# Patient Record
Sex: FEMALE | Race: WHITE | Employment: OTHER | URBAN - METROPOLITAN AREA
[De-identification: names, ages, dates, MRNs, and addresses within clinical notes are randomized per-mention and may not be internally consistent; named-entity substitution may affect disease eponyms.]

---

## 2017-01-01 ENCOUNTER — SNF VISIT (OUTPATIENT)
Dept: INTERNAL MEDICINE CLINIC | Facility: SKILLED NURSING FACILITY | Age: 72
End: 2017-01-01

## 2017-01-01 ENCOUNTER — OFFICE VISIT (OUTPATIENT)
Dept: FAMILY MEDICINE CLINIC | Facility: CLINIC | Age: 72
End: 2017-01-01

## 2017-01-01 ENCOUNTER — TELEPHONE (OUTPATIENT)
Dept: FAMILY MEDICINE CLINIC | Facility: CLINIC | Age: 72
End: 2017-01-01

## 2017-01-01 ENCOUNTER — PATIENT OUTREACH (OUTPATIENT)
Dept: CASE MANAGEMENT | Age: 72
End: 2017-01-01

## 2017-01-01 ENCOUNTER — APPOINTMENT (OUTPATIENT)
Dept: CT IMAGING | Facility: HOSPITAL | Age: 72
DRG: 064 | End: 2017-01-01
Attending: Other
Payer: MEDICARE

## 2017-01-01 ENCOUNTER — SOCIAL WORK SERVICES (OUTPATIENT)
Dept: FAMILY MEDICINE CLINIC | Facility: CLINIC | Age: 72
End: 2017-01-01

## 2017-01-01 ENCOUNTER — APPOINTMENT (OUTPATIENT)
Dept: CT IMAGING | Facility: HOSPITAL | Age: 72
DRG: 064 | End: 2017-01-01
Attending: EMERGENCY MEDICINE
Payer: MEDICARE

## 2017-01-01 ENCOUNTER — AFTERCARE VISIT (OUTPATIENT)
Dept: FAMILY MEDICINE CLINIC | Facility: CLINIC | Age: 72
End: 2017-01-01

## 2017-01-01 ENCOUNTER — TELEPHONE (OUTPATIENT)
Dept: INTERNAL MEDICINE UNIT | Facility: HOSPITAL | Age: 72
End: 2017-01-01

## 2017-01-01 ENCOUNTER — APPOINTMENT (OUTPATIENT)
Dept: ULTRASOUND IMAGING | Facility: HOSPITAL | Age: 72
DRG: 064 | End: 2017-01-01
Attending: Other
Payer: MEDICARE

## 2017-01-01 ENCOUNTER — HOSPITAL ENCOUNTER (INPATIENT)
Facility: HOSPITAL | Age: 72
LOS: 4 days | Discharge: NURSING, REHAB OR LONG-TERM CARE | DRG: 064 | End: 2017-01-01
Attending: EMERGENCY MEDICINE | Admitting: INTERNAL MEDICINE
Payer: MEDICARE

## 2017-01-01 ENCOUNTER — APPOINTMENT (OUTPATIENT)
Dept: GENERAL RADIOLOGY | Facility: HOSPITAL | Age: 72
DRG: 064 | End: 2017-01-01
Attending: INTERNAL MEDICINE
Payer: MEDICARE

## 2017-01-01 VITALS
BODY MASS INDEX: 17 KG/M2 | WEIGHT: 101.19 LBS | SYSTOLIC BLOOD PRESSURE: 133 MMHG | OXYGEN SATURATION: 96 % | TEMPERATURE: 98 F | DIASTOLIC BLOOD PRESSURE: 67 MMHG | RESPIRATION RATE: 18 BRPM | HEART RATE: 90 BPM

## 2017-01-01 VITALS
SYSTOLIC BLOOD PRESSURE: 122 MMHG | OXYGEN SATURATION: 92 % | WEIGHT: 102 LBS | RESPIRATION RATE: 18 BRPM | HEART RATE: 81 BPM | TEMPERATURE: 97 F | DIASTOLIC BLOOD PRESSURE: 60 MMHG | BODY MASS INDEX: 20 KG/M2

## 2017-01-01 VITALS
SYSTOLIC BLOOD PRESSURE: 133 MMHG | DIASTOLIC BLOOD PRESSURE: 58 MMHG | OXYGEN SATURATION: 94 % | BODY MASS INDEX: 16 KG/M2 | TEMPERATURE: 97 F | RESPIRATION RATE: 18 BRPM | HEART RATE: 90 BPM | WEIGHT: 98.81 LBS

## 2017-01-01 VITALS
TEMPERATURE: 98 F | BODY MASS INDEX: 17 KG/M2 | SYSTOLIC BLOOD PRESSURE: 133 MMHG | RESPIRATION RATE: 18 BRPM | WEIGHT: 101.19 LBS | HEART RATE: 90 BPM | OXYGEN SATURATION: 96 % | DIASTOLIC BLOOD PRESSURE: 67 MMHG

## 2017-01-01 VITALS
HEART RATE: 76 BPM | OXYGEN SATURATION: 93 % | RESPIRATION RATE: 18 BRPM | SYSTOLIC BLOOD PRESSURE: 127 MMHG | TEMPERATURE: 97 F | BODY MASS INDEX: 19 KG/M2 | DIASTOLIC BLOOD PRESSURE: 82 MMHG | WEIGHT: 96.81 LBS

## 2017-01-01 VITALS
OXYGEN SATURATION: 96 % | DIASTOLIC BLOOD PRESSURE: 70 MMHG | BODY MASS INDEX: 19 KG/M2 | RESPIRATION RATE: 18 BRPM | SYSTOLIC BLOOD PRESSURE: 134 MMHG | TEMPERATURE: 97 F | WEIGHT: 99.81 LBS | HEART RATE: 95 BPM

## 2017-01-01 VITALS
DIASTOLIC BLOOD PRESSURE: 85 MMHG | HEART RATE: 88 BPM | WEIGHT: 98 LBS | SYSTOLIC BLOOD PRESSURE: 134 MMHG | BODY MASS INDEX: 16 KG/M2

## 2017-01-01 VITALS
TEMPERATURE: 97 F | BODY MASS INDEX: 21 KG/M2 | OXYGEN SATURATION: 97 % | WEIGHT: 109.19 LBS | DIASTOLIC BLOOD PRESSURE: 60 MMHG | SYSTOLIC BLOOD PRESSURE: 120 MMHG | RESPIRATION RATE: 20 BRPM | HEART RATE: 81 BPM

## 2017-01-01 VITALS
WEIGHT: 95.19 LBS | BODY MASS INDEX: 18.69 KG/M2 | HEIGHT: 60 IN | TEMPERATURE: 99 F | SYSTOLIC BLOOD PRESSURE: 123 MMHG | DIASTOLIC BLOOD PRESSURE: 87 MMHG | RESPIRATION RATE: 20 BRPM | HEART RATE: 92 BPM | OXYGEN SATURATION: 91 %

## 2017-01-01 VITALS
SYSTOLIC BLOOD PRESSURE: 122 MMHG | WEIGHT: 102 LBS | OXYGEN SATURATION: 97 % | DIASTOLIC BLOOD PRESSURE: 60 MMHG | TEMPERATURE: 97 F | RESPIRATION RATE: 20 BRPM | BODY MASS INDEX: 20 KG/M2 | HEART RATE: 81 BPM

## 2017-01-01 VITALS
SYSTOLIC BLOOD PRESSURE: 127 MMHG | WEIGHT: 96.81 LBS | HEART RATE: 76 BPM | BODY MASS INDEX: 19 KG/M2 | DIASTOLIC BLOOD PRESSURE: 82 MMHG | TEMPERATURE: 97 F | OXYGEN SATURATION: 97 % | RESPIRATION RATE: 20 BRPM

## 2017-01-01 VITALS
HEART RATE: 82 BPM | TEMPERATURE: 98 F | WEIGHT: 100 LBS | BODY MASS INDEX: 20 KG/M2 | SYSTOLIC BLOOD PRESSURE: 136 MMHG | DIASTOLIC BLOOD PRESSURE: 80 MMHG

## 2017-01-01 VITALS
SYSTOLIC BLOOD PRESSURE: 110 MMHG | OXYGEN SATURATION: 95 % | RESPIRATION RATE: 18 BRPM | DIASTOLIC BLOOD PRESSURE: 74 MMHG | HEART RATE: 80 BPM | TEMPERATURE: 99 F

## 2017-01-01 VITALS
WEIGHT: 99.38 LBS | SYSTOLIC BLOOD PRESSURE: 122 MMHG | HEART RATE: 81 BPM | RESPIRATION RATE: 18 BRPM | BODY MASS INDEX: 19 KG/M2 | DIASTOLIC BLOOD PRESSURE: 60 MMHG | TEMPERATURE: 97 F

## 2017-01-01 DIAGNOSIS — M40.00 KYPHOSIS (ACQUIRED) (POSTURAL): ICD-10-CM

## 2017-01-01 DIAGNOSIS — F32.A DEPRESSION, UNSPECIFIED DEPRESSION TYPE: ICD-10-CM

## 2017-01-01 DIAGNOSIS — J96.01 ACUTE RESPIRATORY FAILURE WITH HYPOXIA (HCC): ICD-10-CM

## 2017-01-01 DIAGNOSIS — Z99.81 DEPENDENCE ON SUPPLEMENTAL OXYGEN: ICD-10-CM

## 2017-01-01 DIAGNOSIS — R41.3 SHORT-TERM MEMORY LOSS: Primary | ICD-10-CM

## 2017-01-01 DIAGNOSIS — K21.9 GASTROESOPHAGEAL REFLUX DISEASE, ESOPHAGITIS PRESENCE NOT SPECIFIED: ICD-10-CM

## 2017-01-01 DIAGNOSIS — M40.10 KYPHOSIS DUE TO OSTEOPOROSIS: ICD-10-CM

## 2017-01-01 DIAGNOSIS — Z98.890 H/O KYPHOPLASTY: ICD-10-CM

## 2017-01-01 DIAGNOSIS — M48.03 SPINAL STENOSIS OF CERVICOTHORACIC REGION: ICD-10-CM

## 2017-01-01 DIAGNOSIS — E87.6 HYPOKALEMIA: ICD-10-CM

## 2017-01-01 DIAGNOSIS — F41.1 GENERALIZED ANXIETY DISORDER: ICD-10-CM

## 2017-01-01 DIAGNOSIS — F41.9 ANXIETY DISORDER, UNSPECIFIED TYPE: ICD-10-CM

## 2017-01-01 DIAGNOSIS — J44.9 CHRONIC OBSTRUCTIVE PULMONARY DISEASE, UNSPECIFIED COPD TYPE (HCC): ICD-10-CM

## 2017-01-01 DIAGNOSIS — M40.00 KYPHOSIS (ACQUIRED) (POSTURAL): Primary | ICD-10-CM

## 2017-01-01 DIAGNOSIS — M54.40 CHRONIC BILATERAL LOW BACK PAIN WITH SCIATICA, SCIATICA LATERALITY UNSPECIFIED: Primary | ICD-10-CM
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
## 2017-01-01 DIAGNOSIS — M81.0 KYPHOSIS DUE TO OSTEOPOROSIS: ICD-10-CM

## 2017-01-01 DIAGNOSIS — F01.50 MIXED VASCULAR AND NEURODEGENERATIVE DEMENTIA WITHOUT BEHAVIORAL DISTURBANCE (HCC): ICD-10-CM

## 2017-01-01 DIAGNOSIS — J43.9 PULMONARY EMPHYSEMA, UNSPECIFIED EMPHYSEMA TYPE (HCC): ICD-10-CM

## 2017-01-01 DIAGNOSIS — J98.01 ACUTE BRONCHOSPASM: ICD-10-CM

## 2017-01-01 DIAGNOSIS — G89.4 CHRONIC PAIN SYNDROME: Primary | ICD-10-CM

## 2017-01-01 DIAGNOSIS — I63.312 CEREBROVASCULAR ACCIDENT (CVA) DUE TO THROMBOSIS OF LEFT MIDDLE CEREBRAL ARTERY (HCC): Primary | ICD-10-CM

## 2017-01-01 DIAGNOSIS — G89.4 CHRONIC PAIN SYNDROME: ICD-10-CM

## 2017-01-01 DIAGNOSIS — N30.00 ACUTE CYSTITIS WITHOUT HEMATURIA: Primary | ICD-10-CM

## 2017-01-01 DIAGNOSIS — R41.3 MEMORY LOSS: ICD-10-CM

## 2017-01-01 DIAGNOSIS — I63.312 CEREBROVASCULAR ACCIDENT (CVA) DUE TO THROMBOSIS OF LEFT MIDDLE CEREBRAL ARTERY (HCC): ICD-10-CM

## 2017-01-01 DIAGNOSIS — R48.2 APRAXIA: ICD-10-CM

## 2017-01-01 DIAGNOSIS — R53.1 GENERALIZED WEAKNESS: ICD-10-CM

## 2017-01-01 DIAGNOSIS — J96.01 ACUTE RESPIRATORY FAILURE WITH HYPOXIA (HCC): Primary | ICD-10-CM

## 2017-01-01 DIAGNOSIS — G89.29 CHRONIC BILATERAL LOW BACK PAIN WITH SCIATICA, SCIATICA LATERALITY UNSPECIFIED: Primary | ICD-10-CM

## 2017-01-01 DIAGNOSIS — M54.42 ACUTE BACK PAIN WITH SCIATICA, LEFT: Primary | ICD-10-CM

## 2017-01-01 DIAGNOSIS — E44.1 PROTEIN-CALORIE MALNUTRITION, MILD (HCC): ICD-10-CM

## 2017-01-01 DIAGNOSIS — I63.9 CEREBROVASCULAR ACCIDENT (CVA), UNSPECIFIED MECHANISM (HCC): ICD-10-CM

## 2017-01-01 PROCEDURE — 99233 SBSQ HOSP IP/OBS HIGH 50: CPT | Performed by: HOSPITALIST

## 2017-01-01 PROCEDURE — 99310 SBSQ NF CARE HIGH MDM 45: CPT | Performed by: NURSE PRACTITIONER

## 2017-01-01 PROCEDURE — 99232 SBSQ HOSP IP/OBS MODERATE 35: CPT | Performed by: OTHER

## 2017-01-01 PROCEDURE — 99308 SBSQ NF CARE LOW MDM 20: CPT | Performed by: NURSE PRACTITIONER

## 2017-01-01 PROCEDURE — G0180 MD CERTIFICATION HHA PATIENT: HCPCS | Performed by: FAMILY MEDICINE

## 2017-01-01 PROCEDURE — 71260 CT THORAX DX C+: CPT | Performed by: EMERGENCY MEDICINE

## 2017-01-01 PROCEDURE — 70450 CT HEAD/BRAIN W/O DYE: CPT | Performed by: OTHER

## 2017-01-01 PROCEDURE — 99223 1ST HOSP IP/OBS HIGH 75: CPT | Performed by: OTHER

## 2017-01-01 PROCEDURE — 71020 XR CHEST PA + LAT CHEST (CPT=71020): CPT | Performed by: INTERNAL MEDICINE

## 2017-01-01 PROCEDURE — 99309 SBSQ NF CARE MODERATE MDM 30: CPT | Performed by: NURSE PRACTITIONER

## 2017-01-01 PROCEDURE — 90792 PSYCH DIAG EVAL W/MED SRVCS: CPT | Performed by: OTHER

## 2017-01-01 PROCEDURE — 3E0F76Z INTRODUCTION OF NUTRITIONAL SUBSTANCE INTO RESPIRATORY TRACT, VIA NATURAL OR ARTIFICIAL OPENING: ICD-10-PCS | Performed by: HOSPITALIST

## 2017-01-01 PROCEDURE — 99214 OFFICE O/P EST MOD 30 MIN: CPT | Performed by: FAMILY MEDICINE

## 2017-01-01 PROCEDURE — 99239 HOSP IP/OBS DSCHRG MGMT >30: CPT | Performed by: HOSPITALIST

## 2017-01-01 PROCEDURE — 99221 1ST HOSP IP/OBS SF/LOW 40: CPT | Performed by: INTERNAL MEDICINE

## 2017-01-01 PROCEDURE — 93880 EXTRACRANIAL BILAT STUDY: CPT | Performed by: OTHER

## 2017-01-01 PROCEDURE — 99495 TRANSJ CARE MGMT MOD F2F 14D: CPT | Performed by: FAMILY MEDICINE

## 2017-01-01 PROCEDURE — G0463 HOSPITAL OUTPT CLINIC VISIT: HCPCS | Performed by: FAMILY MEDICINE

## 2017-01-01 PROCEDURE — 99307 SBSQ NF CARE SF MDM 10: CPT | Performed by: NURSE PRACTITIONER

## 2017-01-01 RX ORDER — LATANOPROST 50 UG/ML
1 SOLUTION/ DROPS OPHTHALMIC NIGHTLY
Status: DISCONTINUED | OUTPATIENT
Start: 2017-01-01 | End: 2017-01-01

## 2017-01-01 RX ORDER — CALCITONIN SALMON 200 [IU]/.09ML
1 SPRAY, METERED NASAL DAILY
Status: DISCONTINUED | OUTPATIENT
Start: 2017-01-01 | End: 2017-01-01

## 2017-01-01 RX ORDER — HYDROCODONE BITARTRATE AND ACETAMINOPHEN 7.5; 325 MG/1; MG/1
1 TABLET ORAL EVERY 6 HOURS PRN
Qty: 60 TABLET | Refills: 0 | Status: SHIPPED | OUTPATIENT
Start: 2017-01-01 | End: 2017-01-01 | Stop reason: CLARIF

## 2017-01-01 RX ORDER — QUETIAPINE 25 MG/1
25 TABLET, FILM COATED ORAL NIGHTLY
Qty: 30 TABLET | Refills: 0 | Status: ON HOLD | OUTPATIENT
Start: 2017-01-01 | End: 2018-01-01

## 2017-01-01 RX ORDER — ACETAMINOPHEN 650 MG/1
650 SUPPOSITORY RECTAL EVERY 4 HOURS PRN
Status: DISCONTINUED | OUTPATIENT
Start: 2017-01-01 | End: 2017-01-01

## 2017-01-01 RX ORDER — IPRATROPIUM BROMIDE AND ALBUTEROL SULFATE 2.5; .5 MG/3ML; MG/3ML
3 SOLUTION RESPIRATORY (INHALATION) EVERY 6 HOURS PRN
Status: DISCONTINUED | OUTPATIENT
Start: 2017-01-01 | End: 2017-01-01

## 2017-01-01 RX ORDER — ALBUTEROL SULFATE 2.5 MG/3ML
2.5 SOLUTION RESPIRATORY (INHALATION) EVERY 2 HOUR PRN
Status: DISCONTINUED | OUTPATIENT
Start: 2017-01-01 | End: 2017-01-01

## 2017-01-01 RX ORDER — DONEPEZIL HYDROCHLORIDE 10 MG/1
10 TABLET, FILM COATED ORAL NIGHTLY
Status: DISCONTINUED | OUTPATIENT
Start: 2017-01-01 | End: 2017-01-01

## 2017-01-01 RX ORDER — NYSTATIN 100000 U/G
CREAM TOPICAL 2 TIMES DAILY
Status: DISCONTINUED | OUTPATIENT
Start: 2017-01-01 | End: 2017-01-01

## 2017-01-01 RX ORDER — ONDANSETRON 2 MG/ML
4 INJECTION INTRAMUSCULAR; INTRAVENOUS EVERY 6 HOURS PRN
Status: DISCONTINUED | OUTPATIENT
Start: 2017-01-01 | End: 2017-01-01

## 2017-01-01 RX ORDER — DICYCLOMINE HYDROCHLORIDE 10 MG/1
10 CAPSULE ORAL 2 TIMES DAILY PRN
Status: DISCONTINUED | OUTPATIENT
Start: 2017-01-01 | End: 2017-01-01

## 2017-01-01 RX ORDER — PANTOPRAZOLE SODIUM 20 MG/1
20 TABLET, DELAYED RELEASE ORAL 2 TIMES DAILY
Status: DISCONTINUED | OUTPATIENT
Start: 2017-01-01 | End: 2017-01-01

## 2017-01-01 RX ORDER — ACETAMINOPHEN 325 MG/1
650 TABLET ORAL EVERY 4 HOURS PRN
Status: DISCONTINUED | OUTPATIENT
Start: 2017-01-01 | End: 2017-01-01

## 2017-01-01 RX ORDER — LORAZEPAM 1 MG/1
1 TABLET ORAL EVERY 4 HOURS PRN
Status: DISCONTINUED | OUTPATIENT
Start: 2017-01-01 | End: 2017-01-01

## 2017-01-01 RX ORDER — MAGNESIUM OXIDE 400 MG (241.3 MG MAGNESIUM) TABLET
400 TABLET ONCE
Status: COMPLETED | OUTPATIENT
Start: 2017-01-01 | End: 2017-01-01

## 2017-01-01 RX ORDER — BISACODYL 10 MG
10 SUPPOSITORY, RECTAL RECTAL
Status: DISCONTINUED | OUTPATIENT
Start: 2017-01-01 | End: 2017-01-01

## 2017-01-01 RX ORDER — BUPROPION HYDROCHLORIDE 150 MG/1
150 TABLET ORAL DAILY
Status: DISCONTINUED | OUTPATIENT
Start: 2017-01-01 | End: 2017-01-01

## 2017-01-01 RX ORDER — SERTRALINE HYDROCHLORIDE 100 MG/1
300 TABLET, FILM COATED ORAL DAILY
Status: DISCONTINUED | OUTPATIENT
Start: 2017-01-01 | End: 2017-01-01

## 2017-01-01 RX ORDER — HEPARIN SODIUM 5000 [USP'U]/ML
5000 INJECTION, SOLUTION INTRAVENOUS; SUBCUTANEOUS EVERY 12 HOURS SCHEDULED
Status: DISCONTINUED | OUTPATIENT
Start: 2017-01-01 | End: 2017-01-01

## 2017-01-01 RX ORDER — LORAZEPAM 1 MG/1
1 TABLET ORAL EVERY 4 HOURS
Status: DISCONTINUED | OUTPATIENT
Start: 2017-01-01 | End: 2017-01-01

## 2017-01-01 RX ORDER — QUETIAPINE 100 MG/1
25 TABLET, FILM COATED ORAL NIGHTLY
Status: DISCONTINUED | OUTPATIENT
Start: 2017-01-01 | End: 2017-01-01

## 2017-01-01 RX ORDER — BISACODYL 10 MG
10 SUPPOSITORY, RECTAL RECTAL DAILY PRN
Qty: 20 SUPPOSITORY | Refills: 0 | Status: ON HOLD | OUTPATIENT
Start: 2017-01-01 | End: 2018-01-01

## 2017-01-01 RX ORDER — LORAZEPAM 0.5 MG/1
0.5 TABLET ORAL EVERY 4 HOURS PRN
Status: DISCONTINUED | OUTPATIENT
Start: 2017-01-01 | End: 2017-01-01

## 2017-01-01 RX ORDER — IPRATROPIUM BROMIDE AND ALBUTEROL SULFATE 2.5; .5 MG/3ML; MG/3ML
SOLUTION RESPIRATORY (INHALATION)
Refills: 0 | COMMUNITY
Start: 2017-01-01

## 2017-01-01 RX ORDER — LORAZEPAM 0.5 MG/1
0.5 TABLET ORAL EVERY 4 HOURS PRN
Qty: 20 TABLET | Refills: 0 | Status: ON HOLD | OUTPATIENT
Start: 2017-01-01 | End: 2018-01-01

## 2017-01-01 RX ORDER — HEPARIN SODIUM 5000 [USP'U]/ML
5000 INJECTION, SOLUTION INTRAVENOUS; SUBCUTANEOUS EVERY 8 HOURS SCHEDULED
Status: DISCONTINUED | OUTPATIENT
Start: 2017-01-01 | End: 2017-01-01

## 2017-01-01 RX ORDER — CALCITONIN SALMON 200 [IU]/.09ML
1 SPRAY, METERED NASAL DAILY
Qty: 3 BOTTLE | Refills: 3 | Status: SHIPPED | OUTPATIENT
Start: 2017-01-01

## 2017-01-01 RX ORDER — POLYETHYLENE GLYCOL 3350 17 G/17G
17 POWDER, FOR SOLUTION ORAL DAILY PRN
Status: DISCONTINUED | OUTPATIENT
Start: 2017-01-01 | End: 2017-01-01

## 2017-01-01 RX ORDER — ACETAMINOPHEN 325 MG/1
650 TABLET ORAL EVERY 6 HOURS PRN
Status: DISCONTINUED | OUTPATIENT
Start: 2017-01-01 | End: 2017-01-01

## 2017-01-01 RX ORDER — QUETIAPINE 100 MG/1
100 TABLET, FILM COATED ORAL NIGHTLY
Status: DISCONTINUED | OUTPATIENT
Start: 2017-01-01 | End: 2017-01-01

## 2017-01-01 RX ORDER — SODIUM CHLORIDE 0.9 % (FLUSH) 0.9 %
3 SYRINGE (ML) INJECTION AS NEEDED
Status: DISCONTINUED | OUTPATIENT
Start: 2017-01-01 | End: 2017-01-01

## 2017-01-01 RX ORDER — ASPIRIN 81 MG/1
81 TABLET, CHEWABLE ORAL DAILY
Status: DISCONTINUED | OUTPATIENT
Start: 2017-01-01 | End: 2017-01-01

## 2017-01-01 RX ORDER — MEMANTINE HYDROCHLORIDE 10 MG/1
10 TABLET ORAL 2 TIMES DAILY
Status: DISCONTINUED | OUTPATIENT
Start: 2017-01-01 | End: 2017-01-01

## 2017-01-01 RX ORDER — HYDROCODONE BITARTRATE AND ACETAMINOPHEN 7.5; 325 MG/1; MG/1
TABLET ORAL
Refills: 0 | COMMUNITY
Start: 2017-01-01 | End: 2017-01-01

## 2017-01-01 RX ORDER — ALBUTEROL SULFATE 90 UG/1
2 AEROSOL, METERED RESPIRATORY (INHALATION) EVERY 6 HOURS PRN
Status: DISCONTINUED | OUTPATIENT
Start: 2017-01-01 | End: 2017-01-01

## 2017-01-01 RX ORDER — QUETIAPINE 100 MG/1
50 TABLET, FILM COATED ORAL NIGHTLY
Status: DISCONTINUED | OUTPATIENT
Start: 2017-01-01 | End: 2017-01-01

## 2017-01-01 RX ORDER — DOCUSATE SODIUM 100 MG/1
100 CAPSULE, LIQUID FILLED ORAL 2 TIMES DAILY
Status: DISCONTINUED | OUTPATIENT
Start: 2017-01-01 | End: 2017-01-01

## 2017-01-01 RX ORDER — ASPIRIN 81 MG/1
81 TABLET, CHEWABLE ORAL DAILY
Qty: 30 TABLET | Refills: 0 | Status: SHIPPED | OUTPATIENT
Start: 2017-01-01

## 2017-01-01 RX ORDER — SODIUM PHOSPHATE, DIBASIC AND SODIUM PHOSPHATE, MONOBASIC 7; 19 G/133ML; G/133ML
1 ENEMA RECTAL ONCE AS NEEDED
Status: DISCONTINUED | OUTPATIENT
Start: 2017-01-01 | End: 2017-01-01

## 2017-01-01 RX ORDER — 0.9 % SODIUM CHLORIDE 0.9 %
VIAL (ML) INJECTION
Status: COMPLETED
Start: 2017-01-01 | End: 2017-01-01

## 2017-01-01 RX ORDER — POTASSIUM CHLORIDE 20 MEQ/1
40 TABLET, EXTENDED RELEASE ORAL EVERY 4 HOURS
Status: COMPLETED | OUTPATIENT
Start: 2017-01-01 | End: 2017-01-01

## 2017-01-01 RX ORDER — IPRATROPIUM BROMIDE AND ALBUTEROL SULFATE 2.5; .5 MG/3ML; MG/3ML
3 SOLUTION RESPIRATORY (INHALATION)
Status: DISCONTINUED | OUTPATIENT
Start: 2017-01-01 | End: 2017-01-01

## 2017-01-05 ENCOUNTER — APPOINTMENT (OUTPATIENT)
Dept: WOUND CARE | Facility: HOSPITAL | Age: 72
End: 2017-01-05
Payer: MEDICARE

## 2017-01-12 RX ORDER — DICYCLOMINE HYDROCHLORIDE 10 MG/1
CAPSULE ORAL
Qty: 180 CAPSULE | Refills: 0 | Status: SHIPPED | OUTPATIENT
Start: 2017-01-12 | End: 2017-03-10

## 2017-01-12 NOTE — TELEPHONE ENCOUNTER
Chart reviewed. Refills sent per Triage Dept protocol.      Refill Protocol Appointment Criteria  · Appointment scheduled in the past 6 months or in the next 3 months  Recent Visits       Provider Department Primary Dx    2 months ago Donaldo Hernandez MD

## 2017-01-30 ENCOUNTER — OFFICE VISIT (OUTPATIENT)
Dept: FAMILY MEDICINE CLINIC | Facility: CLINIC | Age: 72
End: 2017-01-30

## 2017-01-30 VITALS
DIASTOLIC BLOOD PRESSURE: 70 MMHG | HEART RATE: 76 BPM | SYSTOLIC BLOOD PRESSURE: 145 MMHG | TEMPERATURE: 98 F | BODY MASS INDEX: 21 KG/M2 | RESPIRATION RATE: 20 BRPM | WEIGHT: 106 LBS

## 2017-01-30 DIAGNOSIS — G89.29 CHRONIC BILATERAL LOW BACK PAIN WITH BILATERAL SCIATICA: ICD-10-CM

## 2017-01-30 DIAGNOSIS — M54.41 CHRONIC BILATERAL LOW BACK PAIN WITH BILATERAL SCIATICA: ICD-10-CM

## 2017-01-30 DIAGNOSIS — L89.611 DECUBITUS ULCER OF RIGHT HEEL, STAGE 1: Primary | ICD-10-CM

## 2017-01-30 DIAGNOSIS — F41.9 ANXIETY DISORDER, UNSPECIFIED TYPE: ICD-10-CM

## 2017-01-30 DIAGNOSIS — K21.9 GASTROESOPHAGEAL REFLUX DISEASE, ESOPHAGITIS PRESENCE NOT SPECIFIED: ICD-10-CM

## 2017-01-30 DIAGNOSIS — M40.00 KYPHOSIS (ACQUIRED) (POSTURAL): ICD-10-CM

## 2017-01-30 DIAGNOSIS — M54.42 CHRONIC BILATERAL LOW BACK PAIN WITH BILATERAL SCIATICA: ICD-10-CM

## 2017-01-30 PROCEDURE — 99214 OFFICE O/P EST MOD 30 MIN: CPT | Performed by: FAMILY MEDICINE

## 2017-01-30 PROCEDURE — G0463 HOSPITAL OUTPT CLINIC VISIT: HCPCS | Performed by: FAMILY MEDICINE

## 2017-01-30 RX ORDER — HYDROCODONE BITARTRATE AND ACETAMINOPHEN 7.5; 325 MG/1; MG/1
1 TABLET ORAL EVERY 4 HOURS PRN
Qty: 150 TABLET | Refills: 0 | Status: SHIPPED | OUTPATIENT
Start: 2017-03-10 | End: 2017-01-30

## 2017-01-30 RX ORDER — HYDROCODONE BITARTRATE AND ACETAMINOPHEN 7.5; 325 MG/1; MG/1
1 TABLET ORAL EVERY 4 HOURS PRN
Qty: 150 TABLET | Refills: 0 | Status: SHIPPED | OUTPATIENT
Start: 2017-02-08 | End: 2017-01-30

## 2017-01-30 RX ORDER — HYDROCODONE BITARTRATE AND ACETAMINOPHEN 7.5; 325 MG/1; MG/1
1 TABLET ORAL EVERY 4 HOURS PRN
Qty: 150 TABLET | Refills: 0 | Status: SHIPPED | OUTPATIENT
Start: 2017-04-09 | End: 2017-05-09

## 2017-01-30 NOTE — PROGRESS NOTES
HPI:    Patient ID: Jackelyn Alcaraz is a 66year old female. Patient presents with:  Fibromyalgia: f/u  Wound: R heel  Referral: request PT referral/ prefers at home PT if possible    HPI  3 mo followup for Norco refills.    Hx GERD, back deformity,and ch latanoprost (XALATAN) 0.005 % Ophthalmic Solution  Disp:  Rfl:    Sertraline HCl (ZOLOFT) 100 MG Oral Tab  Disp:  Rfl:    LACTULOSE 10 GM/15ML Oral Solution TAKE 2 TABLESPOONS  DAILY Disp: 1419 mL Rfl: 0     Allergies:  Clindamycin             Rash, [place] days           Imaging & Referrals:  None         #[id number]

## 2017-02-01 ENCOUNTER — TELEPHONE (OUTPATIENT)
Dept: INTERNAL MEDICINE CLINIC | Facility: CLINIC | Age: 72
End: 2017-02-01

## 2017-02-01 DIAGNOSIS — M54.50 CHRONIC BILATERAL LOW BACK PAIN WITHOUT SCIATICA: Primary | ICD-10-CM

## 2017-02-01 DIAGNOSIS — M40.00 KYPHOSIS (ACQUIRED) (POSTURAL): ICD-10-CM

## 2017-02-01 DIAGNOSIS — G89.29 CHRONIC BILATERAL LOW BACK PAIN WITHOUT SCIATICA: Primary | ICD-10-CM

## 2017-02-01 NOTE — TELEPHONE ENCOUNTER
Good Morning,     Patient's home Physical therapy referral must be entered as a Home Health referral for PT. It must indicate a medical reason why patient can't leave her home for this service.      Please advise,   Teri Wilder

## 2017-02-03 ENCOUNTER — TELEPHONE (OUTPATIENT)
Dept: FAMILY MEDICINE CLINIC | Facility: CLINIC | Age: 72
End: 2017-02-03

## 2017-02-04 ENCOUNTER — TELEPHONE (OUTPATIENT)
Dept: FAMILY MEDICINE CLINIC | Facility: CLINIC | Age: 72
End: 2017-02-04

## 2017-02-04 NOTE — TELEPHONE ENCOUNTER
Suhas is at main residential office in [place] and received request today for Physical Therapy for patient. She is requesting demographic and insurance info for patient to be faxed to [phone]. Faxed.

## 2017-02-04 NOTE — TELEPHONE ENCOUNTER
Suhas from [facility] called in stating they received a referral for pt to start home care services, but Suhas is requesting pt's demographics and face sheet, please. Fax # [phone].

## 2017-02-06 NOTE — TELEPHONE ENCOUNTER
Suhas called back in requesting an updated face sheet on pt with phone #'s, address and current insurance info please. Same fax number as listed below.

## 2017-02-10 NOTE — TELEPHONE ENCOUNTER
Siria Currei is calling from [facility] want to know if Dr Stanford Turcios will be signing order for PT OT and RN visit   Siria Currie is requesting a call back [phone]

## 2017-02-17 ENCOUNTER — TELEPHONE (OUTPATIENT)
Dept: FAMILY MEDICINE CLINIC | Facility: CLINIC | Age: 72
End: 2017-02-17

## 2017-02-17 NOTE — TELEPHONE ENCOUNTER
Spoke to Sushil from [facility]. She is concerned because the pt's  states that an RN visit is not possible until Tuesday. Spoke to Roseline, the pt's . He states that the main concern is PT for the pt, which began last week.  He states that PT is helpin

## 2017-02-17 NOTE — TELEPHONE ENCOUNTER
Ramona Contreras from [facility] health stating that they are trying to come to  home for physical therapy but  refuses to let them in until Tuesday Feb 21, 2017. Jean Claude Toro please advise

## 2017-02-20 ENCOUNTER — TELEPHONE (OUTPATIENT)
Dept: FAMILY MEDICINE CLINIC | Facility: CLINIC | Age: 72
End: 2017-02-20

## 2017-02-20 NOTE — TELEPHONE ENCOUNTER
[address] states pt began [facility] PT. Mary Capone states pt has had diarrhea last two visits and vomiting, please advise if this is a normal sx of pts condition.

## 2017-02-21 NOTE — TELEPHONE ENCOUNTER
Please call pt and inform her that her home PT is reporting pt has been having diarrhea and some vomiting. Are these new sxs or unchanged from past GI issues?

## 2017-02-22 NOTE — TELEPHONE ENCOUNTER
Reason for Call/Chief Complaint: Diarrhea  Onset: Ongoing diarrhea (not a new issue for pt)  Nursing Assessment/Associated Symptoms: Dr Rosita Campo (spouse) reports pt was sleeping and didn't want to wake her. Reports her symptoms are not new.  Continues

## 2017-02-24 ENCOUNTER — TELEPHONE (OUTPATIENT)
Dept: FAMILY MEDICINE CLINIC | Facility: CLINIC | Age: 72
End: 2017-02-24

## 2017-02-24 NOTE — TELEPHONE ENCOUNTER
María from [facility] calling to request extended order for (3) additional weeks of home physical therapy twice a week.

## 2017-03-14 NOTE — TELEPHONE ENCOUNTER
Last refilled 1/12/17 #180 0RF    Not part of protocol    ·   Recent Visits       Provider Department Primary Dx    1 month ago Jennifer Dominguez MD [facility], [address] Decubitus ulcer of right heel, stage 1    4 months ago Mckenzie

## 2017-03-15 RX ORDER — DICYCLOMINE HYDROCHLORIDE 10 MG/1
CAPSULE ORAL
Qty: 180 CAPSULE | Refills: 1 | Status: ON HOLD | OUTPATIENT
Start: 2017-03-15 | End: 2018-01-01

## 2017-03-16 ENCOUNTER — TELEPHONE (OUTPATIENT)
Dept: FAMILY MEDICINE CLINIC | Facility: CLINIC | Age: 72
End: 2017-03-16

## 2017-03-16 NOTE — TELEPHONE ENCOUNTER
Doris Israel from [facility] O/T is calling for approval for an extension for O/T for this pt. She would like to see this patient 1 time a week for another 3 weeks. A verbal order would suffice.  Thank you

## 2017-03-17 NOTE — TELEPHONE ENCOUNTER
Sharif [facility]) [phone] answer, lmtcb ext [phone] until 4:00 pm today only, then ext [phone] thereafter. Staff to clarify if order needed if for PT or OT.

## 2017-03-20 NOTE — TELEPHONE ENCOUNTER
Ana Yang returned call. Srinivas Estes order is for OT. Per Ana Yang , verbal order is okay to be left on VM. Please advise.

## 2017-03-21 ENCOUNTER — TELEPHONE (OUTPATIENT)
Dept: FAMILY MEDICINE CLINIC | Facility: CLINIC | Age: 72
End: 2017-03-21

## 2017-03-21 NOTE — TELEPHONE ENCOUNTER
Augustine Martinez, [facility] Nurse Cande Senior states order is for OT. Please advise. Thank you.

## 2017-03-24 ENCOUNTER — TELEPHONE (OUTPATIENT)
Dept: FAMILY MEDICINE CLINIC | Facility: CLINIC | Age: 72
End: 2017-03-24

## 2017-03-30 ENCOUNTER — TELEPHONE (OUTPATIENT)
Dept: FAMILY MEDICINE CLINIC | Facility: CLINIC | Age: 72
End: 2017-03-30

## 2017-03-30 NOTE — TELEPHONE ENCOUNTER
Per Pamella/OT she went and visited pt yesterday 03/29/2017 and  of pt mentioned that pt that, pt fell 03/28/2017 Pt did not go to ER nor Urgent care, no bruises or cuts. Pt is still able to walk, pt did not hit her head and no new pain.   stts

## 2017-03-30 NOTE — TELEPHONE ENCOUNTER
Melodie Valadez ext [phone] or [phone]. Is HH Mignon Mortimer message below just a FYI? Does she needs orders?

## 2017-03-30 NOTE — TELEPHONE ENCOUNTER
May from [facility] calling to inform  that patient will be discharged from home health PT and OT services next week. Not expecting call back unless there are questions or concerns. If any do exist, call May at [phone].

## 2017-04-07 ENCOUNTER — OFFICE VISIT (OUTPATIENT)
Dept: FAMILY MEDICINE CLINIC | Facility: CLINIC | Age: 72
End: 2017-04-07

## 2017-04-07 ENCOUNTER — LAB ENCOUNTER (OUTPATIENT)
Dept: LAB | Age: 72
End: 2017-04-07
Attending: FAMILY MEDICINE
Payer: MEDICARE

## 2017-04-07 VITALS
BODY MASS INDEX: 20 KG/M2 | HEART RATE: 78 BPM | SYSTOLIC BLOOD PRESSURE: 141 MMHG | WEIGHT: 102 LBS | DIASTOLIC BLOOD PRESSURE: 85 MMHG | RESPIRATION RATE: 20 BRPM

## 2017-04-07 DIAGNOSIS — G89.4 CHRONIC PAIN SYNDROME: ICD-10-CM

## 2017-04-07 DIAGNOSIS — K59.03 DRUG-INDUCED CONSTIPATION: ICD-10-CM

## 2017-04-07 DIAGNOSIS — M81.0 AGE-RELATED OSTEOPOROSIS WITHOUT CURRENT PATHOLOGICAL FRACTURE: ICD-10-CM

## 2017-04-07 DIAGNOSIS — Z00.00 ANNUAL PHYSICAL EXAM: Primary | ICD-10-CM

## 2017-04-07 DIAGNOSIS — Z00.00 ANNUAL PHYSICAL EXAM: ICD-10-CM

## 2017-04-07 DIAGNOSIS — M54.9 CHRONIC BACK PAIN GREATER THAN 3 MONTHS DURATION: ICD-10-CM

## 2017-04-07 DIAGNOSIS — G89.29 CHRONIC BACK PAIN GREATER THAN 3 MONTHS DURATION: ICD-10-CM

## 2017-04-07 DIAGNOSIS — L98.9 HEEL SORE: ICD-10-CM

## 2017-04-07 DIAGNOSIS — M40.15 OTHER SECONDARY KYPHOSIS, THORACOLUMBAR REGION: ICD-10-CM

## 2017-04-07 DIAGNOSIS — M40.00 KYPHOSIS (ACQUIRED) (POSTURAL): ICD-10-CM

## 2017-04-07 PROBLEM — M40.209 HUMPBACK: Status: ACTIVE | Noted: 2017-04-07

## 2017-04-07 PROCEDURE — 81003 URINALYSIS AUTO W/O SCOPE: CPT

## 2017-04-07 PROCEDURE — 80053 COMPREHEN METABOLIC PANEL: CPT

## 2017-04-07 PROCEDURE — 99213 OFFICE O/P EST LOW 20 MIN: CPT | Performed by: FAMILY MEDICINE

## 2017-04-07 PROCEDURE — G0463 HOSPITAL OUTPT CLINIC VISIT: HCPCS | Performed by: FAMILY MEDICINE

## 2017-04-07 PROCEDURE — 85025 COMPLETE CBC W/AUTO DIFF WBC: CPT

## 2017-04-07 PROCEDURE — 36415 COLL VENOUS BLD VENIPUNCTURE: CPT

## 2017-04-07 RX ORDER — ALBUTEROL SULFATE 90 UG/1
2 AEROSOL, METERED RESPIRATORY (INHALATION) EVERY 6 HOURS PRN
COMMUNITY

## 2017-04-07 NOTE — PROGRESS NOTES
HPI:    Patient ID: Mikhail Bello is a 66year old female.   Patient presents with:  Wound Recheck: R heel f/u    HPI  Pt's  had sent me a MY CHART message on 3/29 re persistent redness around a possible non healing wound of a longstanding R heel MG Oral Tablet Dispersible  Disp:  Rfl:    Memantine HCl 10 MG Oral Tab  Disp:  Rfl:    LORazepam (ATIVAN) 1 MG Oral Tab  Disp:  Rfl:    QUEtiapine Fumarate (SEROQUEL) 100 MG Oral Tab daily.  Disp:  Rfl:    QUEtiapine Fumarate (SEROQUEL) 50 MG Oral Tab  Dis TODAY. Healed but scar around sore is painful, no evidence of infection. Treat with voltaren gel for pain alternating with zinc oxide paste for the crusting skin. 6. Chronic back pain greater than 3 months duration  cpm    7.  Kyphosis (acquired) (postu

## 2017-04-10 PROBLEM — M40.10 KYPHOSIS DUE TO OSTEOPOROSIS: Status: ACTIVE | Noted: 2017-04-07

## 2017-04-10 PROBLEM — E44.1 PROTEIN-CALORIE MALNUTRITION, MILD (HCC): Status: ACTIVE | Noted: 2017-04-10

## 2017-04-10 PROBLEM — F32.4 DEPRESSION, MAJOR, IN PARTIAL REMISSION (HCC): Status: ACTIVE | Noted: 2017-04-10

## 2017-04-10 PROBLEM — Z98.890 H/O KYPHOPLASTY: Status: ACTIVE | Noted: 2017-04-10

## 2017-04-10 PROBLEM — F17.201 TOBACCO DEPENDENCE IN REMISSION: Status: ACTIVE | Noted: 2017-04-10

## 2017-04-10 PROBLEM — M81.0 KYPHOSIS DUE TO OSTEOPOROSIS: Status: ACTIVE | Noted: 2017-04-07

## 2017-04-10 PROBLEM — I70.0 ATHEROSCLEROSIS OF AORTA (HCC): Status: ACTIVE | Noted: 2017-04-10

## 2017-04-12 ENCOUNTER — TELEPHONE (OUTPATIENT)
Dept: FAMILY MEDICINE CLINIC | Facility: CLINIC | Age: 72
End: 2017-04-12

## 2017-04-12 NOTE — TELEPHONE ENCOUNTER
Nelli Oglesby from [facility] stts she would like a call back regarding [facility] open orders.  Please advise

## 2017-04-18 ENCOUNTER — TELEPHONE (OUTPATIENT)
Dept: FAMILY MEDICINE CLINIC | Facility: CLINIC | Age: 72
End: 2017-04-18

## 2017-04-19 NOTE — TELEPHONE ENCOUNTER
Dr. Ozzy Gimenez,   spoke with Petrona Moore from [facility] and said you will need to sign off on orders in the Physician web portal.

## 2017-04-19 NOTE — TELEPHONE ENCOUNTER
Hung Silva MD Udayakumar, Prabhu Deepak, MD; Fahrenbruch, Jeff, [facility]; Mika Redman, RN; Lori Miner, RN        Phone Number: [phone]                     approve            Previous Messages       ----- Message -----      From: Austen Marquez MD      Sent: 4/17/2017  10:46 AM        To: Hung Silva MD, Jeff Fahrenbruch, [facility], *   Subject: RE: Otezla RX                                     Pablo,   My employer has changed from [employer] to [employer] ([place] [facility]).   Samara would be under [employer] care until she sees me in [place].   Mika/Lori,   In my opinion, Samara can try using Otezla 30mg PO daily with food until she sees me in clinic. I have CCed Dr. Silva who needs to give you okay as I cannot officially give orders to you anymore.   Thanks   Valdez     ----- Message -----      From: Fahrenbruch, Jeff, [facility]      Sent: 4/14/2017   3:12 PM        To: Mika Redman, RN, *   Subject: Otezla RX                                         Good Afternoon,     We still have not received the final word on how to proceed with the Otezla RX for Samara. There is no splitting of tabs and no other tabs than 30mg are available. She has not received any Otezla since 2-23-17.     Please call back  Specialty Pharmacy at [phone].     Thanks,     Jeff Fahrenbruch, Pharm D.   [facility]   [phone]              Called and gave pt the message that if she is not able to get the starter pack she should call us.  Dr. Stevenson did write for it for starter pack and and 30 mg tabs.  Pt is wondering if she can continue to take 15 mg BID. Discussed that she should not be taking 15 mg tablets as splitting them could be causing increased gastric distress.  Pt stated she was recently discharged from hospital due to vomiting.  Discussed that the starter pack has been written for and per Dr. Stevenson she can do that.  Above are the orders that Dr. Silva is ok with her taking 30 mg BID.   Kris Lozano left . stts message is just an FYI. Pt is currently taking 15 mg in the am and 30 mg at night. Will call pt back to tomorrow after I hear from the pharmacy, to see about the starter pack, so that pt can take pills without cutting.    Lori Miner RN  [facility]

## 2017-04-26 ENCOUNTER — OFFICE VISIT (OUTPATIENT)
Dept: FAMILY MEDICINE CLINIC | Facility: CLINIC | Age: 72
End: 2017-04-26

## 2017-04-26 VITALS
WEIGHT: 101 LBS | TEMPERATURE: 97 F | BODY MASS INDEX: 20 KG/M2 | DIASTOLIC BLOOD PRESSURE: 80 MMHG | SYSTOLIC BLOOD PRESSURE: 110 MMHG | HEART RATE: 65 BPM

## 2017-04-26 DIAGNOSIS — K21.9 GASTROESOPHAGEAL REFLUX DISEASE, ESOPHAGITIS PRESENCE NOT SPECIFIED: ICD-10-CM

## 2017-04-26 DIAGNOSIS — M40.00 KYPHOSIS (ACQUIRED) (POSTURAL): ICD-10-CM

## 2017-04-26 DIAGNOSIS — R51.9 BILATERAL HEADACHES: ICD-10-CM

## 2017-04-26 DIAGNOSIS — Z91.81 RISK FOR FALLS: ICD-10-CM

## 2017-04-26 DIAGNOSIS — G89.4 CHRONIC PAIN SYNDROME: ICD-10-CM

## 2017-04-26 DIAGNOSIS — J45.20 MILD INTERMITTENT ASTHMA WITHOUT COMPLICATION: ICD-10-CM

## 2017-04-26 DIAGNOSIS — M54.9 CHRONIC BACK PAIN GREATER THAN 3 MONTHS DURATION: ICD-10-CM

## 2017-04-26 DIAGNOSIS — F32.A DEPRESSION, UNSPECIFIED DEPRESSION TYPE: ICD-10-CM

## 2017-04-26 DIAGNOSIS — M79.7 FIBROMYALGIA: ICD-10-CM

## 2017-04-26 DIAGNOSIS — I70.0 ATHEROSCLEROSIS OF AORTA (HCC): ICD-10-CM

## 2017-04-26 DIAGNOSIS — Z00.00 ANNUAL PHYSICAL EXAM: Primary | ICD-10-CM

## 2017-04-26 DIAGNOSIS — F41.9 ANXIETY DISORDER, UNSPECIFIED TYPE: ICD-10-CM

## 2017-04-26 DIAGNOSIS — L89.611 DECUBITUS ULCER OF RIGHT HEEL, STAGE 1: ICD-10-CM

## 2017-04-26 DIAGNOSIS — G89.29 CHRONIC BACK PAIN GREATER THAN 3 MONTHS DURATION: ICD-10-CM

## 2017-04-26 DIAGNOSIS — M81.0 AGE-RELATED OSTEOPOROSIS WITHOUT CURRENT PATHOLOGICAL FRACTURE: ICD-10-CM

## 2017-04-26 DIAGNOSIS — R41.3 SHORT-TERM MEMORY LOSS: ICD-10-CM

## 2017-04-26 PROCEDURE — 96160 PT-FOCUSED HLTH RISK ASSMT: CPT | Performed by: FAMILY MEDICINE

## 2017-04-26 RX ORDER — HYDROCODONE BITARTRATE AND ACETAMINOPHEN 7.5; 325 MG/1; MG/1
1 TABLET ORAL EVERY 4 HOURS PRN
Qty: 150 TABLET | Refills: 0 | Status: SHIPPED | OUTPATIENT
Start: 2017-06-08 | End: 2017-04-26

## 2017-04-26 RX ORDER — HYDROCODONE BITARTRATE AND ACETAMINOPHEN 7.5; 325 MG/1; MG/1
1 TABLET ORAL EVERY 4 HOURS PRN
Qty: 150 TABLET | Refills: 0 | Status: ON HOLD | OUTPATIENT
Start: 2017-07-08 | End: 2017-05-17

## 2017-04-26 RX ORDER — HYDROCODONE BITARTRATE AND ACETAMINOPHEN 7.5; 325 MG/1; MG/1
1 TABLET ORAL EVERY 4 HOURS PRN
Qty: 150 TABLET | Refills: 0 | Status: SHIPPED | OUTPATIENT
Start: 2017-05-09 | End: 2017-04-26

## 2017-04-26 NOTE — PROGRESS NOTES
HPI:    Patient ID: Ivan Barron is a 66year old female. HPI   Annual Medicare Px    HPI:   Ivan Barron is a 66year old female who presents for a Medicare Annual Wellness visit.     Patient Active Problem List:     GERD (gastroesophageal reflu Status     Hearing Problems?: No    Vision Problems? : Yes    Difficulty walking?: Yes    Difficulty dressing or bathing?: Yes    Problems with daily activities? : Yes    Memory Problems?: Yes      Fall/Risk Assessment     Do you have 3 or more medical con every 4 (four) hours as needed for Pain. Disp: 150 tablet Rfl: 0   Albuterol Sulfate  (90 Base) MCG/ACT Inhalation Aero Soln Inhale 2 puffs into the lungs every 6 (six) hours as needed for Wheezing.  Disp:  Rfl:    Diclofenac Sodium 3 % Transdermal G 01/16/2013    Comment Kyphoplasty, L-3 & T-10    COLONOSCOPY & POLYPECTOMY  1-    UPPER GI ENDOSCOPY PERFORMED  2008    Comment done by dr.mc arias      Family History   Problem Relation Age of Onset   • Cancer Father      osteo cancer- cause of subhash RRR without murmur  GI: good BS's, no masses, HSM or tenderness  : deferred  RECTAL: deferred  MUSCULOSKELETAL: back is not tender, FROM of the back  EXTREMITIES: no cyanosis, clubbing or edema.   NEURO: Oriented times three, cranial nerves are intact, mo Cancer Screening      Colonoscopy Screen every 10 years Colonoscopy,10 Years due on 01/18/2022 Update Health Maintenance if applicable    Flex Sigmoidoscopy Screen every 10 years No results found for this or any previous visit. No flowsheet data found. Value   01/21/2016 0.54    No flowsheet data found. BUN  Annually BUN (mg/dL)   Date Value   04/07/2017 15   01/21/2016 12    No flowsheet data found. Drug Serum Conc  Annually No results found for: DIGOXIN, DIG, VALP No flowsheet data found.     Snehal 10/14/2011  10/24/2012  10/20/2014      Influenza Vaccine, High Dose, Preserv Free                          10/26/2015      Pneumovax 23 (Lot Mgr)                          11/07/2014      Zoster (Shingles)     11/09/2016      Hx of advance kyphotic deformi twice a week, milder than in past   Psychiatric/Behavioral: Negative for sleep disturbance and depressed mood.         Some diff falling asleep, but once asleep remains asleep              Current Outpatient Prescriptions:  Albuterol Sulfate  (90 Bas sure which one caused the reaction. PHYSICAL EXAM:   Physical Exam  Constitutional: appears well hydrated alert and responsive no acute distress noted, well developed, well nourishhed, is thin. Drowsy today, presumably due to meds?   Head/Face: nor intermittent asthma without complication  1. Annual physical exam  Labs reviewed    2. Chronic pain syndrome  Cpm, refill hydrocodone    3. Kyphosis (acquired) (postural)  cpm    4. Anxiety disorder, unspecified type  Stable, seeing psych, cpm    5.  Bilate

## 2017-04-26 NOTE — PROGRESS NOTES
HPI:    Patient ID: Ivan Barron is a 66year old female.     HPI    Review of Systems         Current Outpatient Prescriptions:  Albuterol Sulfate  (90 Base) MCG/ACT Inhalation Aero Soln Inhale 2 puffs into the lungs every 6 (six) hours as neede ASSESSMENT/PLAN:   No diagnosis found. No orders of the defined types were placed in this encounter.        Meds This Visit:  No prescriptions requested or ordered in this encounter    Imaging & Referrals:  None       JT#[id number]

## 2017-05-04 ENCOUNTER — TELEPHONE (OUTPATIENT)
Dept: FAMILY MEDICINE CLINIC | Facility: CLINIC | Age: 72
End: 2017-05-04

## 2017-05-04 DIAGNOSIS — M40.15 OTHER SECONDARY KYPHOSIS, THORACOLUMBAR REGION: Primary | ICD-10-CM

## 2017-05-04 DIAGNOSIS — Z78.9 POOR TOLERANCE FOR AMBULATION: ICD-10-CM

## 2017-05-04 NOTE — TELEPHONE ENCOUNTER
Pt spouse Massiel Mendez called stating that he has questions abt the patients medication and would like to speak w/ RN Breann Flores regarding his concern.

## 2017-05-11 NOTE — TELEPHONE ENCOUNTER
Spoke to , responding to message he needed a call back. Pt is in need of additonal Home PT, cannot travel due to back deformity and poor ambulatory strength.  Will go ahead and send in referral to [facility] for additional pt for improved function, amb

## 2017-05-14 ENCOUNTER — APPOINTMENT (OUTPATIENT)
Dept: GENERAL RADIOLOGY | Facility: HOSPITAL | Age: 72
DRG: 536 | End: 2017-05-14
Attending: EMERGENCY MEDICINE
Payer: MEDICARE

## 2017-05-14 ENCOUNTER — APPOINTMENT (OUTPATIENT)
Dept: CT IMAGING | Facility: HOSPITAL | Age: 72
DRG: 536 | End: 2017-05-14
Attending: EMERGENCY MEDICINE
Payer: MEDICARE

## 2017-05-14 ENCOUNTER — HOSPITAL ENCOUNTER (INPATIENT)
Facility: HOSPITAL | Age: 72
LOS: 3 days | Discharge: NURSING, REHAB OR LONG-TERM CARE | DRG: 536 | End: 2017-05-17
Attending: EMERGENCY MEDICINE | Admitting: HOSPITALIST
Payer: MEDICARE

## 2017-05-14 DIAGNOSIS — S72.002A CLOSED LEFT HIP FRACTURE, INITIAL ENCOUNTER (HCC): ICD-10-CM

## 2017-05-14 DIAGNOSIS — M25.511 ACUTE PAIN OF RIGHT SHOULDER: ICD-10-CM

## 2017-05-14 DIAGNOSIS — S79.912A HIP INJURY, LEFT, INITIAL ENCOUNTER: ICD-10-CM
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
## 2017-05-14 PROCEDURE — 73502 X-RAY EXAM HIP UNI 2-3 VIEWS: CPT | Performed by: EMERGENCY MEDICINE

## 2017-05-14 PROCEDURE — 73030 X-RAY EXAM OF SHOULDER: CPT | Performed by: EMERGENCY MEDICINE

## 2017-05-14 PROCEDURE — 73700 CT LOWER EXTREMITY W/O DYE: CPT | Performed by: EMERGENCY MEDICINE

## 2017-05-14 PROCEDURE — 99222 1ST HOSP IP/OBS MODERATE 55: CPT | Performed by: HOSPITALIST

## 2017-05-14 RX ORDER — HYDROCODONE BITARTRATE AND ACETAMINOPHEN 5; 325 MG/1; MG/1
2 TABLET ORAL ONCE
Status: COMPLETED | OUTPATIENT
Start: 2017-05-14 | End: 2017-05-14

## 2017-05-15 PROCEDURE — 99233 SBSQ HOSP IP/OBS HIGH 50: CPT | Performed by: HOSPITALIST

## 2017-05-15 RX ORDER — QUETIAPINE 25 MG/1
100 TABLET, FILM COATED ORAL NIGHTLY
Status: DISCONTINUED | OUTPATIENT
Start: 2017-05-15 | End: 2017-05-17

## 2017-05-15 RX ORDER — SERTRALINE HYDROCHLORIDE 100 MG/1
100 TABLET, FILM COATED ORAL DAILY
Status: DISCONTINUED | OUTPATIENT
Start: 2017-05-15 | End: 2017-05-17

## 2017-05-15 RX ORDER — HEPARIN SODIUM 5000 [USP'U]/ML
5000 INJECTION, SOLUTION INTRAVENOUS; SUBCUTANEOUS EVERY 12 HOURS
Status: DISCONTINUED | OUTPATIENT
Start: 2017-05-15 | End: 2017-05-17

## 2017-05-15 RX ORDER — CALCITONIN SALMON 200 [IU]/.09ML
1 SPRAY, METERED NASAL DAILY
Status: DISCONTINUED | OUTPATIENT
Start: 2017-05-15 | End: 2017-05-17

## 2017-05-15 RX ORDER — HYDROCODONE BITARTRATE AND ACETAMINOPHEN 5; 325 MG/1; MG/1
1 TABLET ORAL EVERY 6 HOURS PRN
Status: DISCONTINUED | OUTPATIENT
Start: 2017-05-15 | End: 2017-05-17

## 2017-05-15 RX ORDER — ACETAMINOPHEN 325 MG/1
650 TABLET ORAL EVERY 6 HOURS PRN
Status: DISCONTINUED | OUTPATIENT
Start: 2017-05-15 | End: 2017-05-17

## 2017-05-15 RX ORDER — MEMANTINE HYDROCHLORIDE 10 MG/1
10 TABLET ORAL 2 TIMES DAILY
Status: DISCONTINUED | OUTPATIENT
Start: 2017-05-15 | End: 2017-05-17

## 2017-05-15 RX ORDER — ONDANSETRON 2 MG/ML
4 INJECTION INTRAMUSCULAR; INTRAVENOUS EVERY 4 HOURS PRN
Status: DISCONTINUED | OUTPATIENT
Start: 2017-05-15 | End: 2017-05-17

## 2017-05-15 RX ORDER — DONEPEZIL HYDROCHLORIDE 10 MG/1
10 TABLET, FILM COATED ORAL NIGHTLY
Status: DISCONTINUED | OUTPATIENT
Start: 2017-05-15 | End: 2017-05-17

## 2017-05-15 RX ORDER — SODIUM CHLORIDE 9 MG/ML
INJECTION, SOLUTION INTRAVENOUS CONTINUOUS
Status: DISCONTINUED | OUTPATIENT
Start: 2017-05-15 | End: 2017-05-15

## 2017-05-15 RX ORDER — LORAZEPAM 1 MG/1
1 TABLET ORAL EVERY 4 HOURS PRN
Status: DISCONTINUED | OUTPATIENT
Start: 2017-05-15 | End: 2017-05-17

## 2017-05-15 RX ORDER — QUETIAPINE 25 MG/1
50 TABLET, FILM COATED ORAL NIGHTLY
Status: DISCONTINUED | OUTPATIENT
Start: 2017-05-15 | End: 2017-05-17

## 2017-05-15 RX ORDER — LATANOPROST 50 UG/ML
1 SOLUTION/ DROPS OPHTHALMIC NIGHTLY
Status: DISCONTINUED | OUTPATIENT
Start: 2017-05-15 | End: 2017-05-17

## 2017-05-15 RX ORDER — ONDANSETRON 2 MG/ML
4 INJECTION INTRAMUSCULAR; INTRAVENOUS EVERY 6 HOURS PRN
Status: DISCONTINUED | OUTPATIENT
Start: 2017-05-15 | End: 2017-05-17

## 2017-05-15 RX ORDER — SODIUM CHLORIDE AND POTASSIUM CHLORIDE .9; .15 G/100ML; G/100ML
SOLUTION INTRAVENOUS CONTINUOUS
Status: DISCONTINUED | OUTPATIENT
Start: 2017-05-15 | End: 2017-05-15 | Stop reason: RX

## 2017-05-15 RX ORDER — BUPROPION HYDROCHLORIDE 150 MG/1
150 TABLET, EXTENDED RELEASE ORAL 2 TIMES DAILY
Status: DISCONTINUED | OUTPATIENT
Start: 2017-05-15 | End: 2017-05-17

## 2017-05-15 NOTE — DISCHARGE PLANNING
CHARANJIT met with the pt. At bedside. The pt. Lives with her  in a 2 level home with a bedroom on the main level. The pt. Reports being independent prior to admission with adls and ambulation with a walker with a seat. The pt.  Does not drive but her hu

## 2017-05-15 NOTE — H&P
[facility] Patient Status:  Emergency    1945 MRN [id number]   Location [address] Attending Magaly Fry[address] Day # 0 PCP Daksha Cruz MD any smokeless tobacco history on file. She reports that she does not drink alcohol or use illicit drugs.     Allergies:    Clindamycin             Rash, Shortness of Breath    Comment:Per pt received this medicine and bactrim in ER             not sure jose l Taking  Yes No   mupirocin 2 % External Ointment Not Taking  No No   Sig: Apply to heel 3 x a day for 5 days      Facility-Administered Medications: None       Review of Systems:  Constitutional:  Weakness, Fatigue. Eye:  Negative.   Ear/Nose/Mouth/Throat: will use morphine as needed for pain, patient will remain n.p.o. for now until evaluated by orthopedics, unclear from medical perspective the patient is to be treated conservatively or with ORIF. Will consider medical clearance when known.     Dementia wit

## 2017-05-15 NOTE — DISCHARGE PLANNING
SW was notified by [facility] they are no longer in network with the pt's insurance. SW met with the pt. And her  and they are agreeable to a referral to [facility].  Referral has been made and DON screen and PAS screen has been requested.   Chun

## 2017-05-15 NOTE — PROGRESS NOTES
[facility]    Progress Note    Flori Small Patient Status:  Inpatient    1945 MRN [id number]   Location [facility] 4W/SW/SE Attending Mason Lorenzo MD   Hosp Day # 1 PCP Duy Diaz MD     Subjective:   Subjective: 5. Partially visualized L3 kyphoplasty. 6. Atherosclerosis. 7. A preliminary report was submitted and there are no major discrepancies.                       Brianna Tang MD  5/15/2017

## 2017-05-15 NOTE — PROGRESS NOTES
[facility]    Progress Note    Estela Fajardo Patient Status:  Inpatient    1945 MRN [id number]   Location [facility] 4W/SW/SE Attending Nick Morris MD   Hosp Day # 1 PCP Alicia Jaime MD     Subjective:  Pain controlled greater trochanter. Chronic gluteus medius tear. Xr Hip W Or Wo Pelvis 2 Or 3 Views, Left (cpt=73502)    5/15/2017  CONCLUSION:  1. Undisplaced fracture base of left greater trochanter.  2. Old fractures of the right superior and inferior pubic verónica

## 2017-05-15 NOTE — PHYSICAL THERAPY NOTE
PHYSICAL THERAPY EVALUATION - INPATIENT     Room Number: 410/410-A  Evaluation Date: 5/15/2017  Type of Evaluation: Initial  Physician Order: PT Eval and Treat    Presenting Problem: fall  Reason for Therapy: Mobility Dysfunction and Discharge Planning (Obs): Daily       PHYSICAL THERAPY MEDICAL/SOCIAL HISTORY     History related to current admission: The pt is a 66year old female who presented to the hospital after a fall with left hip pain.  The pt was having difficulty ambulating and presented to the 0  Location:  (left leg)  Management Techniques:  Activity promotion;Repositioning (no pain at rest)    COGNITION  · Overall Cognitive Status:  WFL - within functional limits  · Orientation Level:  oriented to person and disoriented to time    [address] pt requires increased assistance from lower chair heights    Exercise/Education Provided:  Bed mobility  Body mechanics  Gait training  Posture  Strengthening  Lower therapeutic exercise:   Ankle pumps  Transfer training    Patient End of Session: Up in marvin

## 2017-05-15 NOTE — ED PROVIDER NOTES
Patient Seen in: [facility] AND [facility] Emergency Department    History   Patient presents with:  Contusion (musculoskeletal)    Stated Complaint:     HPI    66-year-old female presents for complaint of left hip pain and right shoulder pain.   Patient states t Sertraline HCl (ZOLOFT) 100 MG Oral Tab,     Albuterol Sulfate  (90 Base) MCG/ACT Inhalation Aero Soln,  Inhale 2 puffs into the lungs every 6 (six) hours as needed for Wheezing.    Diclofenac Sodium 3 % Transdermal Gel,  Apply to heel up to 4 x a well-developed and well-nourished. HENT:   Head: Normocephalic and atraumatic. Right Ear: External ear normal.   Left Ear: External ear normal.   Nose: Nose normal. No sinus tenderness or nasal deformity.    Mouth/Throat: Uvula is midline, oropharynx is Coordination and gait normal. GCS eye subscore is 4. GCS verbal subscore is 5. GCS motor subscore is 6. Skin: Skin is warm, dry and intact. Psychiatric: She has a normal mood and affect. Her speech is normal.   Nursing note and vitals reviewed.

## 2017-05-15 NOTE — PROGRESS NOTES
Donepezil HCl (ARICEPT ODT) disintegrating tab 10 mg is Non-Formulary Medication &  Auto-Substituted to Donepezil 10mg oral tablet Per P&T PROTOCOL

## 2017-05-15 NOTE — ED INITIAL ASSESSMENT (HPI)
Pt was at home using bathroom and had mechanical fall, injured L mid upper thigh and hip. CMS intact, pulse 2+. Unknown if she hit her head, denies LOC. Pt not on anticoags. Severe kyphosis noted.

## 2017-05-15 NOTE — CONSULTS
[facility] HOSP - [facility]    Report of Consultation    Simran Calvillo Patient Status:  Inpatient    1945 MRN [id number]   Location [facility] 4W/SW/SE Attending Rico Irizarry MD   Hosp Day # 1 PCP Prince Marie MD     Date of Admission: sure which one caused the reaction.     Medications:    Current facility-administered medications:   •  ondansetron HCl (ZOFRAN) injection 4 mg, 4 mg, Intravenous, Q4H PRN  •  Sertraline HCl (ZOLOFT) tab 100 mg, 100 mg, Oral, Daily  •  QUEtiapine Fumarate 05/14/2017      Xr Shoulder, Complete (min 2 Views), Right (cpt=73030)    5/15/2017  CONCLUSION:  1. No acute disease. 2. Demineralization. 3. Osteoarthritis. 4. Multiple old rib and vertebral fractures. 5. Multiple vertebral kyphoplasties.  6. A preliminar and OT. Pt. May require rehab placement if she is not independent.           Ying Duke  5/15/2017  9:26 AM

## 2017-05-15 NOTE — OCCUPATIONAL THERAPY NOTE
OCCUPATIONAL THERAPY EVALUATION - INPATIENT      Room Number: 410/410-A  Evaluation Date: 5/15/2017  Type of Evaluation: Initial  Presenting Problem:  (L undisplaced greater trochanter fx)    Physician Order: IP Consult to Occupational Therapy  Reason for simplification techniques;ADL training;Functional transfer training;UE strengthening/ROM; Endurance training;Cognitive reorientation;Patient/Family education;Patient/Family training;Equipment eval/education; Compensatory technique education         Mattie Lucia tasks and increased safety awareness.      Patient self-stated goal is: Have leg better    OCCUPATIONAL THERAPY EXAMINATION     OBJECTIVE  Precautions: Limb alert - left  Fall Risk: High fall risk    WEIGHT BEARING RESTRICTION  Weight Bearing Restriction: L A (rolling chair used)  Shower Transfer: n/t   Chair Transfer: mod. A w/RW  Car Transfer: n/t     Bedroom Mobility: mod. A w/RW and max verbal instructions      FUNCTIONAL ADL ASSESSMENT  Grooming: n/t   Bathing: n/t   Toileting: max.  A   Upper Body Dressi

## 2017-05-16 PROCEDURE — 90792 PSYCH DIAG EVAL W/MED SRVCS: CPT | Performed by: OTHER

## 2017-05-16 PROCEDURE — 99233 SBSQ HOSP IP/OBS HIGH 50: CPT | Performed by: HOSPITALIST

## 2017-05-16 RX ORDER — 0.9 % SODIUM CHLORIDE 0.9 %
VIAL (ML) INJECTION
Status: COMPLETED
Start: 2017-05-16 | End: 2017-05-16

## 2017-05-16 RX ORDER — POTASSIUM CHLORIDE 20 MEQ/1
40 TABLET, EXTENDED RELEASE ORAL EVERY 4 HOURS
Status: COMPLETED | OUTPATIENT
Start: 2017-05-16 | End: 2017-05-16

## 2017-05-16 NOTE — PROGRESS NOTES
[facility]    Progress Note    Brennen Reyna Patient Status:  Inpatient    1945 MRN [id number]   Location [facility] 4W/SW/SE Attending Luis Chaudhari MD   Hosp Day # 2 PCP Jas Martinez MD     Subjective:  Mague Saldana JOVANI  5/16/2017  11:54 AM

## 2017-05-16 NOTE — PROGRESS NOTES
[facility]    Progress Note    John Kelley Patient Status:  Inpatient    1945 MRN [id number]   Location [facility] 4W/SW/SE Attending Nabeel Valencia MD   Hosp Day # 2 PCP Marlen Vanegas MD     Subjective:  Pain controlled discrepancies. Ct Hip(bone) Left (cpt=73700)    5/15/2017  CONCLUSION: Comminuted mildly displaced fracture of the greater trochanter. Chronic gluteus medius tear.          Xr Hip W Or Wo Pelvis 2 Or 3 Views, Left (cpt=73502)    5/15/2017  CONCLUSI

## 2017-05-16 NOTE — OCCUPATIONAL THERAPY NOTE
OCCUPATIONAL THERAPY TREATMENT NOTE - INPATIENT     Room Number: 172/868-K         Presenting Problem:  (LT greater trochanter fx, non surgical)    Problem List  Principal Problem:    Fall, initial encounter  Active Problems:    Fall    Acute pain of right clothing?: A Lot  -   Bathing (including washing, rinsing, drying)?: A Lot  -   Toileting, which includes using toilet, bedpan or urinal? : A Lot  -   Putting on and taking off regular upper body clothing?: A Lot  -   Taking care of personal grooming such

## 2017-05-16 NOTE — DIETARY NOTE
ADULT NUTRITION INITIAL ASSESSMENT    Pt is at moderate nutrition risk. Pt meets malnutrition criteria.       CRITERIA FOR MALNUTRITION DIAGNOSIS:  Criteria for non-severe malnutrition diagnosis: chronic illness related to body fat mild depletion and relat 46.267 kg (102 lb)  01/30/17 : 48.081 kg (106 lb)  12/21/16 : 46.358 kg (102 lb 3.2 oz)  11/09/16 : 45.45 kg (100 lb 3.2 oz)  09/20/16 : 46.267 kg (102 lb)  08/01/16 : 45.813 kg (101 lb)  04/20/16 : 46.993 kg (103 lb 9.6 oz)  03/28/16 : 47.628 kg (105 lb) adequacy of supplement intake.     - Anthropometric Measurement:      Monitor: wt change     - Nutrition Goals:      true wt gain, PO and supplement greater than 75% of needs and prevent skin breakdown      DIETITIAN FOLLOW UP: RD to follow up within 7 days

## 2017-05-16 NOTE — PHYSICAL THERAPY NOTE
PHYSICAL THERAPY TREATMENT NOTE - INPATIENT    Room Number: 952/676-I       Presenting Problem: fall    Problem List  Principal Problem:    Fall, initial encounter  Active Problems:    Fall    Acute pain of right shoulder    Hip injury, left, initial enco (including a wheelchair)?: A Lot   -   Need to walk in hospital room?: A Lot   -   Climbing 3-5 steps with a railing?: Total    AM-PAC Score:  Raw Score: 11   PT Approx Degree of Impairment Score: 72.57%   Standardized Score (AM-PAC Scale): 33.86   CMS Mod

## 2017-05-17 VITALS
TEMPERATURE: 99 F | HEART RATE: 108 BPM | HEIGHT: 59 IN | RESPIRATION RATE: 16 BRPM | OXYGEN SATURATION: 90 % | WEIGHT: 102 LBS | SYSTOLIC BLOOD PRESSURE: 122 MMHG | DIASTOLIC BLOOD PRESSURE: 79 MMHG | BODY MASS INDEX: 20.56 KG/M2

## 2017-05-17 PROCEDURE — 99239 HOSP IP/OBS DSCHRG MGMT >30: CPT | Performed by: HOSPITALIST

## 2017-05-17 RX ORDER — HYDROCODONE BITARTRATE AND ACETAMINOPHEN 7.5; 325 MG/1; MG/1
1 TABLET ORAL EVERY 4 HOURS PRN
Qty: 30 TABLET | Refills: 0 | Status: SHIPPED | OUTPATIENT
Start: 2017-07-08 | End: 2017-06-23 | Stop reason: ALTCHOICE

## 2017-05-17 RX ORDER — BUTALBITAL, ACETAMINOPHEN AND CAFFEINE 50; 325; 40 MG/1; MG/1; MG/1
1 TABLET ORAL EVERY 4 HOURS PRN
Qty: 20 TABLET | Refills: 0 | Status: SHIPPED | OUTPATIENT
Start: 2017-05-17 | End: 2018-01-01

## 2017-05-17 RX ORDER — ACETAMINOPHEN 325 MG/1
650 TABLET ORAL EVERY 6 HOURS PRN
Qty: 30 TABLET | Refills: 0 | Status: SHIPPED | OUTPATIENT
Start: 2017-05-17

## 2017-05-17 RX ORDER — LORAZEPAM 1 MG/1
1 TABLET ORAL EVERY 4 HOURS PRN
Qty: 30 TABLET | Refills: 0 | Status: ON HOLD | OUTPATIENT
Start: 2017-05-17 | End: 2017-10-02

## 2017-05-17 RX ORDER — LORAZEPAM 1 MG/1
1 TABLET ORAL EVERY 4 HOURS PRN
Qty: 30 TABLET | Refills: 0 | Status: SHIPPED | OUTPATIENT
Start: 2017-05-17 | End: 2017-05-17

## 2017-05-17 NOTE — PHYSICAL THERAPY NOTE
PHYSICAL THERAPY TREATMENT NOTE - INPATIENT    Room Number: 473/148-R       Presenting Problem: fall    Problem List  Principal Problem:    Fall, initial encounter  Active Problems:    Fall    Acute pain of right shoulder    Hip injury, left, initial enco etc.): A Lot   -   Moving from lying on back to sitting on the side of the bed?: A Lot   How much help from another person does the patient currently need. ..   -   Moving to and from a bed to a chair (including a wheelchair)?: A Lot   -   Need to walk in h

## 2017-05-17 NOTE — CM/SW NOTE
CTL update re: progression of care. Patient may be ready for d/c today to [facility].  CTL informed Dr. Tamika Hancock that  received authorization from insurance company that patient may d/c to [facility] when medically stable for discharge.

## 2017-05-17 NOTE — PROGRESS NOTES
[address]    Progress Note    Rena Miss Patient Status:  Inpatient    1945 MRN [id number]   Location [facility] 4W/SW/SE Attending Alda Casanova, [address] [facility] Day # 3 PCP Donato Kong MD     Subjective:  Alec Vail

## 2017-05-17 NOTE — DISCHARGE PLANNING
Referral has been made to [facility] and update sent today 5/17. DON screen has been previously requested, PAS screen is not needed as per psych note Generalized Anxiety disorder is the pt's diagnosis.       Meredith Aragon [facility] ext [phone]

## 2017-05-17 NOTE — DISCHARGE PLANNING
The pt. Is scheduled to discharge to discharge to [facility] today 5/17 at 3p, via ambulance due to inability to maintain a sitting position.       Report [phone]    Rehan Saleem, [address] [place] [address]

## 2017-05-17 NOTE — CONSULTS
[facility]    Report of Consultation    Isis Booker Patient Status:  Inpatient    1945 MRN [id number]   Location [facility] 4W/SW/SE Attending Gordo Deluna MD   Hosp Day # 2 PCP Artemio Calderon MD     Date of Admission management.       Medical History:       Past Medical History  Past Medical History   Diagnosis Date   • Fibromyalgia    • Ankle fracture, left 1985     ORIF   • Carpal tunnel syndrome 2009     L CTS surgery   • Sternal fracture 02/ 2011     from fall    • (Porcine) 5000 UNIT/ML injection 5,000 Units 5,000 Units Subcutaneous Q12H   LORazepam (ATIVAN) tab 1 mg 1 mg Oral Q4H PRN       Prescriptions prior to admission:  [START ON 7/8/2017] hydrocodone-acetaminophen 7.5-325 MG Oral Tab Take 1 tablet by mouth jonn 05/16/2017   CREATSERUM 0.60 05/16/2017   BUN 8 05/16/2017    05/16/2017   K 4.6 05/16/2017    05/16/2017   CO2 27 05/16/2017   * 05/16/2017   CA 9.1 05/16/2017   ALB 3.7 04/07/2017   ALKPHO 90 04/07/2017   TP 6.8 04/07/2017   AST 28 04/ impaired. Judgment and insight are impaired but patient has been cooperative the treatment plan.       Impression:   Impression:       Generalized anxiety disorder  Mixed vascular and degenerative dementia    Discussed risk and benefit, acknowledging the c

## 2017-05-18 NOTE — DISCHARGE SUMMARY
[facility]    Discharge Summary    Shukri Aragon Patient Status:  Inpatient    1945 MRN [id number]   Location [facility] 4W/SW/SE Attending No att. providers found   [address] Day # 3 PCP Bevreley Vera MD     Date of Admi Awake and alert  Severe kyphosis  CV:   RRR.   No m/g/r  Pulm:   CTA bilat  Abd:   +bs, soft, NT, ND  LE:  No c/c/e  Neuro:  nonfocal      Reason for Admission: left hip pain    History of Present Illness:   Lidya Dallas is a(n) 66year old female, wit known as:  ATIVAN   What changed:    - how much to take  - how to take this  - when to take this  - reasons to take this        Take 1 tablet (1 mg total) by mouth every 4 (four) hours as needed for Anxiety.     Quantity:  30 tablet   Refills:  0         CO Refills:  0       mupirocin 2 % Oint   Commonly known as:  BACTROBAN        Apply to heel 3 x a day for 5 days    Quantity:  15 Tube   Refills:  1       NEXIUM 24HR 20 MG Tbec   Generic drug:  Esomeprazole Magnesium        Take 20 mg by mouth 2 (two) times Risk  0-28   Low Risk. Risk of readmission: Estela Fajardo has Moderate Risk of readmission after discharge from the hospital.       >35 minutes spent preparing this discharge.     Cristhian Turner  5/18/2017  12:35 AM

## 2017-05-19 ENCOUNTER — SNF VISIT (OUTPATIENT)
Dept: INTERNAL MEDICINE CLINIC | Facility: SKILLED NURSING FACILITY | Age: 72
End: 2017-05-19

## 2017-05-19 VITALS
TEMPERATURE: 97 F | WEIGHT: 102 LBS | BODY MASS INDEX: 21 KG/M2 | RESPIRATION RATE: 20 BRPM | HEART RATE: 86 BPM | OXYGEN SATURATION: 97 % | DIASTOLIC BLOOD PRESSURE: 84 MMHG | SYSTOLIC BLOOD PRESSURE: 124 MMHG
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
## 2017-05-19 DIAGNOSIS — S79.912A HIP INJURY, LEFT, INITIAL ENCOUNTER: ICD-10-CM

## 2017-05-19 DIAGNOSIS — F41.1 GENERALIZED ANXIETY DISORDER: ICD-10-CM

## 2017-05-19 DIAGNOSIS — M79.7 FIBROMYALGIA: Primary | ICD-10-CM

## 2017-05-19 NOTE — PROGRESS NOTES
Isis Booker  : 1945  Age 66year old  female patient is admitted to [place] [address] for strengthening and rehab on 2017    Admitted to [address] [place] [address] Avenue: 2017-2017    Chief complaint: Closed left greater trochanter fracture, anxiety disorder, f CATARACT EXTRACTION  2009    CARPAL TUNNEL RELEASE  2009    Comment per NG; L CTS surgery    IR KYPHOPLASTY  01/16/2013    Comment Kyphoplasty, L-3 & T-10    COLONOSCOPY & POLYPECTOMY  1-    UPPER GI ENDOSCOPY PERFORMED  2008    Comment done by  mupirocin 2 % External Ointment Apply to heel 3 x a day for 5 days Disp: 15 Tube Rfl: 1   NEXIUM 24HR 20 MG Oral Tab EC Take 20 mg by mouth 2 (two) times daily. Disp:  Rfl:    calcitonin, salmon, 200 UNIT/ACT Nasal Solution 1 spray by Nasal route daily. nystagmus, no drainage from eyes  HENT: normocephalic; normal nose, no nasal drainage, mucous membranes pink, moist, pharynx no exudate, no visible cerumen.   NECK: supple; FROM; no JVD, no TMG, no carotid bruits  BREAST: no masses, no nipple discharge, no prophylaxis  -Will continue heparin 5000 units SQ Q 12 hours in rehab  -Monitor for calf tenderness   -VS Q shift  7.  Oxygen requirement in rehab/history of tobacco use  -Pulmonary to consult and treat in rehab  -RT to evaluate and treat: attempt wean from

## 2017-05-22 NOTE — PROGRESS NOTES
Jess Espino  : 1945  Age 66year old  female patient is admitted to [place] [address] for strengthening and rehab on 2017.      Admitted to [facility]: 2017-2017    Chief complaint: Closed left greater trochanter fracture, anx and updated    Clindamycin             Rash, Shortness of Breath    Comment:Per pt received this medicine and bactrim in ER             not sure which caused reaction.   Bactrim [Sulfametho*    Rash, Shortness of Breath    Comment:Per pt received bactrim an Disp:  Rfl:    Memantine HCl 10 MG Oral Tab 2 (two) times daily. Disp:  Rfl:    QUEtiapine Fumarate (SEROQUEL) 100 MG Oral Tab nightly. Disp:  Rfl:    QUEtiapine Fumarate (SEROQUEL) 50 MG Oral Tab nightly.    Disp:  Rfl:    latanoprost (XALATAN) 0.005 % masses; no bruits; nontender, no guarding, no rebound tenderness.   :Deferred  LYMPHATIC:no lymphedema  MUSCULOSKELETAL: Decreased ROM to left leg, no crepitus noted  EXTREMITIES/VASCULAR:no cyanosis, clubbing or edema  NEUROLOGIC: intact; no sensorimotor Protein calorie malnutrition  -Dietician to evaluate and recommend while in rehab  -Encourage family to bring food from home  -Ensure Plus 0.05 gram TID    This is a 15 minute visit and greater than 50% of the time was spent counseling the patient and/or c

## 2017-05-25 NOTE — PROGRESS NOTES
Lidya Dallas  : 1945  Age 66year old  female patient is admitted to [place] [address] for strengthening and rehab on 2017    Admitted to [facility] AND [facility] on: 2017-2017    Chief complaint: Closed left greater trochanter fracture, an dinner. Lungs diminished bilaterally, no crackles noted. Tolerating O2 at 3 lpm nasal cannula with sats 97%, pt with severe kyphosis and sits hunched over in wheelchair. Void per adult diapers, last BM 5/24.  Currently wearing leg sleeves to help reduce res HCL 10 MG Oral Cap TAKE 1 CAPSULE TWICE DAILY Disp: 180 capsule Rfl: 1   mupirocin 2 % External Ointment Apply to heel 3 x a day for 5 days Disp: 15 Tube Rfl: 1   NEXIUM 24HR 20 MG Oral Tab EC Take 20 mg by mouth 2 (two) times daily.  Disp:  Rfl:    calcito allergies    PHYSICAL EXAM:  GENERAL HEALTH: Alert to name and time. Calm and cooperative.  No acute distress.    LINES, TUBES, DRAINS:  none  SKIN: pink  WOUND: RUE skin tear  EYES: PERRLA, EOMI, sclera anicteric, conjunctiva normal; there is no nystagmus, Evaluated by Dr Breann Espinosa in hospital-cleared for rehab  - psychiatry to be consulted in rehab  - Ativan changed to 2mg PO TID -now improving  -VS Q shift  5.  Depression  -Continue Zoloft 300mg Daily  -Continue Seroquel 150mg daily  -Continue Wellbutrin SR 1

## 2017-05-30 NOTE — PROGRESS NOTES
Cherise Jeronimo  : 1945  Age 66year old  female patient is admitted to [place] [address] for strengthening and rehab on 2017     Admitted to [facility] AND [facility] on: 2017-2017    Chief complaint: Closed left greater trochanter fracture, a does report her anxiety levels have improved since her dosage was increased and scheduled. Her lungs are diminished to bases, has severe kyphosis. Abdomen is soft and non tender.  No family present during exam.She has no new issues or complaints at this christos 2 (two) times daily. Disp:  Rfl:    QUEtiapine Fumarate (SEROQUEL) 100 MG Oral Tab nightly. Disp:  Rfl:    QUEtiapine Fumarate (SEROQUEL) 50 MG Oral Tab nightly.    Disp:  Rfl:    latanoprost (XALATAN) 0.005 % Ophthalmic Solution  Disp:  Rfl:    Sertral carotid bruits  BREAST: no masses, no nipple discharge, no nodes; normal skin  RESPIRATORY:diminished breath sounds  CARDIOVASCULAR: S1, S2 normal, RRR; no S3, no S4;   ABDOMEN:  normal active BS+, soft, nondistended; no organomegaly, no masses; no bruits; shift  7.  Oxygen requirement in rehab/history of tobacco use  -Pulmonary to consult and treat in rehab  -RT to evaluate and treat: attempt wean from oxygen/may need to be discharge home with oxygen  -Albuterol inhaler  PRN  -VS Q shift  -O2 via nasal cannu

## 2017-06-01 NOTE — PROGRESS NOTES
Clemencia Armenta  : 1945  Age 66year old  female patient is admitted to [place] [address] for strengthening and rehab on 17    Admitted to [facility] on: 17-17    Chief complaint: Closed left greater trochanter fracture, anxiety states she will call for help. Pt was much more calmer and relaxed and examination. She currently has no other new issues or complaints, all vitals have been stable.  Lungs are still diminished with poor inspiratory effort (severe kyphosis posture), tolerat every 6 (six) hours as needed for Wheezing.  Disp:  Rfl:    Diclofenac Sodium 3 % Transdermal Gel Apply to heel up to 4 x a day Disp: 100 g Rfl: 2   DICYCLOMINE HCL 10 MG Oral Cap TAKE 1 CAPSULE TWICE DAILY Disp: 180 capsule Rfl: 1   mupirocin 2 % External loss  ALLERGY/IMM.: denies food or seasonal allergies    PHYSICAL EXAM:  GENERAL HEALTH: Alert to name and time. Calm and cooperative. No acute distress.  Thin frail appearing  LINES, TUBES, DRAINS:  none  SKIN: pink  WOUND: RUE skin tear/R shin skin tear stable in rehab  4. Generalized anxiety disorder  - Evaluated by Dr Cony Bryant in hospital-cleared for rehab  - psychiatry to be consulted in rehab  - Ativan changed to 2mg PO TID -now improving  -VS Q shift  5.  Depression  -Continue Zoloft 300mg Daily  -Cont

## 2017-06-05 ENCOUNTER — SNF VISIT (OUTPATIENT)
Dept: INTERNAL MEDICINE CLINIC | Facility: SKILLED NURSING FACILITY | Age: 72
End: 2017-06-05

## 2017-06-05 VITALS
RESPIRATION RATE: 18 BRPM | WEIGHT: 95.38 LBS | SYSTOLIC BLOOD PRESSURE: 136 MMHG | OXYGEN SATURATION: 92 % | DIASTOLIC BLOOD PRESSURE: 62 MMHG | HEART RATE: 97 BPM | BODY MASS INDEX: 19 KG/M2 | TEMPERATURE: 98 F

## 2017-06-05 NOTE — PROGRESS NOTES
Wong Stage  : 1945  Age 66year old  female patient is admitted to [place] [address] for strengthening and rehab on 17    Admitted to [facility] AND [facility] on: 17-17    Chief complaint: Closed left greater trochanter fracture, anxiety to bases. Pt looking forward to going on Thursday. ALLERGIES:    Clindamycin             Rash, Shortness of Breath    Comment:Per pt received this medicine and bactrim in ER             not sure which caused reaction.   Bactrim [Sulfametho*    Rash, Fara Dispersible nightly. Disp:  Rfl:    Memantine HCl 10 MG Oral Tab 2 (two) times daily. Disp:  Rfl:    QUEtiapine Fumarate (SEROQUEL) 100 MG Oral Tab nightly. Disp:  Rfl:    QUEtiapine Fumarate (SEROQUEL) 50 MG Oral Tab nightly.    Disp:  Rfl:    todd active BS+, soft, nondistended; no organomegaly, no masses; no bruits; nontender, no guarding, no rebound tenderness.   :Deferred  LYMPHATIC:no lymphedema  MUSCULOSKELETAL: Decreased ROM to left leg, no crepitus noted  EXTREMITIES/VASCULAR:no cyanosis, cl    -VS Q shift  7. Oxygen requirement in rehab/history of tobacco use  -Pulmonary to consult and treat in rehab  -RT to evaluate and treat: need to be discharge home with oxygen  -Albuterol inhaler  PRN  -VS Q shift  -O2 via nasal cannula 3lpm  7.  GERD  -C

## 2017-06-06 ENCOUNTER — SNF VISIT (OUTPATIENT)
Dept: INTERNAL MEDICINE CLINIC | Facility: SKILLED NURSING FACILITY | Age: 72
End: 2017-06-06

## 2017-06-06 NOTE — H&P
Brennen Reyna  : 1945  Age 66year old  female patient is admitted to [place] [address] for strengthening and rehab    Admitted to [facility] on: -17    Chief complaint: Closed left greater trochanter fracture, anxiety disorder, and as scheduled this am. Patient care manager completed an EKG that did not have any acute changes. EKG was sent to Dr Colette Garcia and was aware of patient symptoms.  Encouraged nursing staff to ensure pt sits upright in bed or preferably wheelchair to eat all meals Bottle Rfl: 11   Spacer/Aero-Holding Chambers (AEROCHAMBER PLUS CHETAN-VU) Does not apply Misc  Disp:  Rfl:    BuPROPion HCl ER, SR, 150 MG Oral Tablet 12 Hr daily. Disp:  Rfl:    Donepezil HCl 10 MG Oral Tablet Dispersible nightly.    Disp:  Rfl:    Bud Sinha bruits  BREAST: no masses, no nipple discharge, no nodes; normal skin  RESPIRATORY:diminished breath sounds  CARDIOVASCULAR: S1, S2 normal, RRR; no S3, no S4;   ABDOMEN:  normal active BS+, soft, nondistended; no organomegaly, no masses; no bruits; nontend behavior  -Psych consult while in rehab  6. DVT prophylaxis  - Heparin 5000 units SQ Q 12 hours    -Will discontinue heparin on date of discharge from [facility] 6/8   -Monitor for calf tenderness    -VS Q shift  7.  Oxygen requirement in rehab/history of tobacco

## 2017-06-07 ENCOUNTER — SNF VISIT (OUTPATIENT)
Dept: INTERNAL MEDICINE CLINIC | Facility: SKILLED NURSING FACILITY | Age: 72
End: 2017-06-07

## 2017-06-07 VITALS
WEIGHT: 95.63 LBS | BODY MASS INDEX: 19 KG/M2 | HEART RATE: 95 BPM | RESPIRATION RATE: 20 BRPM | TEMPERATURE: 98 F | DIASTOLIC BLOOD PRESSURE: 65 MMHG | OXYGEN SATURATION: 98 % | SYSTOLIC BLOOD PRESSURE: 132 MMHG

## 2017-06-07 DIAGNOSIS — Z99.81 DEPENDENCE ON SUPPLEMENTAL OXYGEN: ICD-10-CM

## 2017-06-07 DIAGNOSIS — M81.0 KYPHOSIS DUE TO OSTEOPOROSIS: Primary | ICD-10-CM

## 2017-06-07 DIAGNOSIS — M40.10 KYPHOSIS DUE TO OSTEOPOROSIS: Primary | ICD-10-CM

## 2017-06-07 NOTE — PROGRESS NOTES
Meche Holloway  : 1945  Age 66year old  female patient is admitted to [facility] strengthening and rehab on 17     Admitted to [facility] AND [facility] on: 17-17    Chief complaint:Closed left greater trochanter fracture, anxiety d reports grab bars being installed at their home and has gait belt, stair lift, walker and wheelchair at home already. Lungs diminished to bases. Pt looking forward to going on Thursday.      ALLERGIES:    Clindamycin             Rash, Shortness of Breath apply Misc  Disp:  Rfl:    BuPROPion HCl ER, SR, 150 MG Oral Tablet 12 Hr daily. Disp:  Rfl:    Donepezil HCl 10 MG Oral Tablet Dispersible nightly. Disp:  Rfl:    Memantine HCl 10 MG Oral Tab 2 (two) times daily.    Disp:  Rfl:    QUEtiapine Fumarate ( nodes; normal skin  RESPIRATORY:diminished breath sounds  CARDIOVASCULAR: S1, S2 normal, RRR; no S3, no S4;   ABDOMEN:  normal active BS+, soft, nondistended; no organomegaly, no masses; no bruits; nontender, no guarding, no rebound tenderness.   [place] Heparin 5000 units SQ Q 12 hours, will be dc upon discharged    -monitor for calf tenderness    -VS Q shift  7.  Oxygen requirement in rehab/history of tobacco use  -Pulmonary followed  and treat in rehab  -RT evaluated need for discharge home with oxygen,

## 2017-06-12 ENCOUNTER — TELEPHONE (OUTPATIENT)
Dept: FAMILY MEDICINE CLINIC | Facility: CLINIC | Age: 72
End: 2017-06-12

## 2017-06-12 DIAGNOSIS — L89.90 PRESSURE ULCER, UNSPECIFIED LOCATION, UNSPECIFIED ULCER STAGE: Primary | ICD-10-CM

## 2017-06-12 NOTE — TELEPHONE ENCOUNTER
Needs referral to [facility]  Patient insurance said he needs new referral because he changed PCP'S    Follow up  Visit on the 6/29

## 2017-06-15 ENCOUNTER — TELEPHONE (OUTPATIENT)
Dept: FAMILY MEDICINE CLINIC | Facility: CLINIC | Age: 72
End: 2017-06-15

## 2017-06-15 NOTE — TELEPHONE ENCOUNTER
Patient will have [facility] px and occupational therapy.      Call if any questions

## 2017-06-15 NOTE — TELEPHONE ENCOUNTER
Dr. De Tipton,   this is a Dr. Aguilar Piña pt, pt will be your patient, she is requesting referral to GI. Do you want pt to schedule a appt.

## 2017-06-20 ENCOUNTER — TELEPHONE (OUTPATIENT)
Dept: FAMILY MEDICINE CLINIC | Facility: CLINIC | Age: 72
End: 2017-06-20

## 2017-06-20 NOTE — TELEPHONE ENCOUNTER
Suhas from [facility] home health stated patient has a wound in her lower leg. Offered a nursing visit but patient  is requesting it for tomorrow - it will be assessed tomorrow. FYI. Please advise.

## 2017-06-21 NOTE — TELEPHONE ENCOUNTER
Spoke w/ Suhas from [facility]. He said Pt does not need more PT orders it was just an Ángela Alejo. He said if more orders are needed he will speak to [employer].

## 2017-06-21 NOTE — TELEPHONE ENCOUNTER
Are more PT orders needed ? Must know ASAP as I cannot personally write ANY orders after June 30 due to upcoming long-term.

## 2017-06-21 NOTE — TELEPHONE ENCOUNTER
Dr Nelly Lr from [facility] called to update md: Pt with RLL shin wound, stage 2 skin tear due to hard bump against furniture. Wound is draining minimal sanguinous drainage. Slight pain with wt bearing.  He is using tegasorb absorbent drsg

## 2017-06-21 NOTE — TELEPHONE ENCOUNTER
[facility], please transfer to [facility] [phone]. Attempting to get further information on wound in lower leg. Thank you.

## 2017-06-21 NOTE — TELEPHONE ENCOUNTER
Skin tear on shin of leg per call from home nurse. Stts has not seen the wound yet. Will go this afternoon and give us a call back.

## 2017-06-23 ENCOUNTER — OFFICE VISIT (OUTPATIENT)
Dept: FAMILY MEDICINE CLINIC | Facility: CLINIC | Age: 72
End: 2017-06-23

## 2017-06-23 VITALS
WEIGHT: 99 LBS | RESPIRATION RATE: 18 BRPM | OXYGEN SATURATION: 88 % | SYSTOLIC BLOOD PRESSURE: 119 MMHG | DIASTOLIC BLOOD PRESSURE: 73 MMHG | HEART RATE: 79 BPM | TEMPERATURE: 98 F | BODY MASS INDEX: 20 KG/M2

## 2017-06-23 DIAGNOSIS — M79.7 FIBROMYALGIA: ICD-10-CM

## 2017-06-23 DIAGNOSIS — G89.4 CHRONIC PAIN SYNDROME: ICD-10-CM

## 2017-06-23 DIAGNOSIS — M40.00 KYPHOSIS (ACQUIRED) (POSTURAL): ICD-10-CM

## 2017-06-23 DIAGNOSIS — S72.22XD CLOSED LEFT SUBTROCHANTERIC FEMUR FRACTURE, WITH ROUTINE HEALING, SUBSEQUENT ENCOUNTER: Primary | ICD-10-CM

## 2017-06-23 PROCEDURE — 99214 OFFICE O/P EST MOD 30 MIN: CPT | Performed by: FAMILY MEDICINE

## 2017-06-23 PROCEDURE — G0463 HOSPITAL OUTPT CLINIC VISIT: HCPCS | Performed by: FAMILY MEDICINE

## 2017-06-23 NOTE — PROGRESS NOTES
HPI:    Patient ID: Rena Clifford is a 66year old female. HPI  Patient presents with:  Hospital F/U  Left hip fracture being treated conservatively without surgery. Discharged for subacute rehab. Review of Systems   Constitutional: Negative.     Re Take 300 mg by mouth daily. Disp:  Rfl:      Allergies:  Clindamycin             Rash, Shortness of Breath    Comment:Per pt received this medicine and bactrim in ER             not sure which caused reaction.   Bactrim [Sulfametho*    Rash, Shortness of

## 2017-06-28 ENCOUNTER — TELEPHONE (OUTPATIENT)
Dept: FAMILY MEDICINE CLINIC | Facility: CLINIC | Age: 72
End: 2017-06-28

## 2017-06-29 ENCOUNTER — OFFICE VISIT (OUTPATIENT)
Dept: GASTROENTEROLOGY | Facility: CLINIC | Age: 72
End: 2017-06-29

## 2017-06-29 VITALS
BODY MASS INDEX: 20.12 KG/M2 | SYSTOLIC BLOOD PRESSURE: 154 MMHG | HEART RATE: 80 BPM | WEIGHT: 99.81 LBS | HEIGHT: 59 IN | DIASTOLIC BLOOD PRESSURE: 83 MMHG

## 2017-06-29 DIAGNOSIS — R19.4 CHANGE IN BOWEL HABITS: Primary | ICD-10-CM

## 2017-06-29 DIAGNOSIS — K21.9 GASTROESOPHAGEAL REFLUX DISEASE WITHOUT ESOPHAGITIS: ICD-10-CM

## 2017-06-29 PROCEDURE — 99213 OFFICE O/P EST LOW 20 MIN: CPT | Performed by: INTERNAL MEDICINE

## 2017-06-29 PROCEDURE — G0463 HOSPITAL OUTPT CLINIC VISIT: HCPCS | Performed by: INTERNAL MEDICINE

## 2017-06-29 RX ORDER — HYDROCODONE BITARTRATE AND ACETAMINOPHEN 7.5; 325 MG/1; MG/1
1 TABLET ORAL EVERY 6 HOURS PRN
COMMUNITY
End: 2017-10-02

## 2017-07-13 ENCOUNTER — TELEPHONE (OUTPATIENT)
Dept: FAMILY MEDICINE CLINIC | Facility: CLINIC | Age: 72
End: 2017-07-13

## 2017-07-13 NOTE — TELEPHONE ENCOUNTER
Per  of pt. Pt don't need her Oxygen anymore and need to send it back to [address] [place] [address] Tel# [phone]. But need an Order so that Vinicius Fried can go and  at pt address. Pls advise.

## 2017-07-14 ENCOUNTER — TELEPHONE (OUTPATIENT)
Dept: FAMILY MEDICINE CLINIC | Facility: CLINIC | Age: 72
End: 2017-07-14

## 2017-07-19 NOTE — TELEPHONE ENCOUNTER
Dr. Magi Claudio,   see below and advise on this, usually Chuck sends up a form to discontinue Oxygen level and I have not seen any form.

## 2017-07-21 NOTE — TELEPHONE ENCOUNTER
Dr. Juan A Benavidez   Pt's  states Pulse ox has been 90 or better for that past 2weeks. He states she has not used oxygen in 2 weeks.

## 2017-07-25 ENCOUNTER — TELEPHONE (OUTPATIENT)
Dept: FAMILY MEDICINE CLINIC | Facility: CLINIC | Age: 72
End: 2017-07-25

## 2017-07-25 NOTE — TELEPHONE ENCOUNTER
Crystal , Physical Therapy calling to request an order from Dr. aSad Yañez for a  so Pt can get services at home.

## 2017-07-26 NOTE — TELEPHONE ENCOUNTER
[facility] Please transfer [phone] anytime. Thank you.

## 2017-07-27 ENCOUNTER — SOCIAL WORK SERVICES (OUTPATIENT)
Dept: FAMILY MEDICINE CLINIC | Facility: CLINIC | Age: 72
End: 2017-07-27

## 2017-07-27 DIAGNOSIS — E44.1 PROTEIN-CALORIE MALNUTRITION, MILD (HCC): ICD-10-CM

## 2017-07-27 DIAGNOSIS — M40.10 KYPHOSIS DUE TO OSTEOPOROSIS: ICD-10-CM

## 2017-07-27 DIAGNOSIS — R26.2 INABILITY TO AMBULATE DUE TO MULTIPLE JOINTS: Primary | ICD-10-CM

## 2017-07-27 DIAGNOSIS — M81.0 KYPHOSIS DUE TO OSTEOPOROSIS: ICD-10-CM

## 2017-07-27 PROCEDURE — G0180 MD CERTIFICATION HHA PATIENT: HCPCS | Performed by: FAMILY MEDICINE

## 2017-08-07 ENCOUNTER — OFFICE VISIT (OUTPATIENT)
Dept: FAMILY MEDICINE CLINIC | Facility: CLINIC | Age: 72
End: 2017-08-07

## 2017-08-07 VITALS
BODY MASS INDEX: 20 KG/M2 | DIASTOLIC BLOOD PRESSURE: 90 MMHG | WEIGHT: 101 LBS | HEART RATE: 74 BPM | SYSTOLIC BLOOD PRESSURE: 152 MMHG

## 2017-08-07 DIAGNOSIS — M79.7 FIBROMYALGIA: Primary | ICD-10-CM

## 2017-08-07 PROCEDURE — G0463 HOSPITAL OUTPT CLINIC VISIT: HCPCS | Performed by: FAMILY MEDICINE

## 2017-08-07 PROCEDURE — 99214 OFFICE O/P EST MOD 30 MIN: CPT | Performed by: FAMILY MEDICINE

## 2017-08-07 RX ORDER — HYDROCODONE BITARTRATE AND ACETAMINOPHEN 7.5; 325 MG/1; MG/1
1 TABLET ORAL EVERY 4 HOURS PRN
Qty: 150 TABLET | Refills: 0 | Status: SHIPPED | OUTPATIENT
Start: 2017-09-06 | End: 2017-10-02

## 2017-08-07 RX ORDER — HYDROCODONE BITARTRATE AND ACETAMINOPHEN 7.5; 325 MG/1; MG/1
1 TABLET ORAL EVERY 4 HOURS PRN
Qty: 150 TABLET | Refills: 0 | Status: ON HOLD | OUTPATIENT
Start: 2017-01-01 | End: 2017-09-28

## 2017-08-07 RX ORDER — HYDROCODONE BITARTRATE AND ACETAMINOPHEN 7.5; 325 MG/1; MG/1
1 TABLET ORAL EVERY 4 HOURS PRN
Qty: 150 TABLET | Refills: 0 | Status: SHIPPED | OUTPATIENT
Start: 2017-08-07 | End: 2017-09-06

## 2017-08-07 NOTE — PROGRESS NOTES
HPI:    Patient ID: Flori Beasley is a 66year old female. HPI  Patient presents with:  Fibromyalgia: f/u Norco medication    Review of Systems   Constitutional: Negative. Musculoskeletal: Positive for arthralgias, back pain and neck pain.    Neuro Rfl:    Memantine HCl 10 MG Oral Tab 2 (two) times daily. Disp:  Rfl:    QUEtiapine Fumarate (SEROQUEL) 100 MG Oral Tab nightly. Disp:  Rfl:    QUEtiapine Fumarate (SEROQUEL) 50 MG Oral Tab nightly.    Disp:  Rfl:    latanoprost (XALATAN) 0.005 % Ophth

## 2017-08-08 ENCOUNTER — TELEPHONE (OUTPATIENT)
Dept: FAMILY MEDICINE CLINIC | Facility: CLINIC | Age: 72
End: 2017-08-08

## 2017-08-08 NOTE — TELEPHONE ENCOUNTER
[address] calling for plan of care form 1  Mehran Ayers was faxed to [facility] in June   Faxed to Shari powell fax

## 2017-08-24 ENCOUNTER — TELEPHONE (OUTPATIENT)
Dept: FAMILY MEDICINE CLINIC | Facility: CLINIC | Age: 72
End: 2017-08-24

## 2017-08-24 NOTE — TELEPHONE ENCOUNTER
Per Terry Medrano they left an Order at the [address] yesterday and checking the updates and need to fax back to them at [phone] so that they can bill the insurance.

## 2017-09-05 ENCOUNTER — TELEPHONE (OUTPATIENT)
Dept: FAMILY MEDICINE CLINIC | Facility: CLINIC | Age: 72
End: 2017-09-05

## 2017-09-08 ENCOUNTER — OFFICE VISIT (OUTPATIENT)
Dept: FAMILY MEDICINE CLINIC | Facility: CLINIC | Age: 72
End: 2017-09-08

## 2017-09-08 VITALS
SYSTOLIC BLOOD PRESSURE: 139 MMHG | HEART RATE: 89 BPM | WEIGHT: 101 LBS | DIASTOLIC BLOOD PRESSURE: 84 MMHG | BODY MASS INDEX: 20 KG/M2

## 2017-09-08 DIAGNOSIS — L03.115 CELLULITIS OF RIGHT LOWER EXTREMITY: Primary | ICD-10-CM

## 2017-09-08 PROCEDURE — 99214 OFFICE O/P EST MOD 30 MIN: CPT | Performed by: FAMILY MEDICINE

## 2017-09-08 PROCEDURE — G0463 HOSPITAL OUTPT CLINIC VISIT: HCPCS | Performed by: FAMILY MEDICINE

## 2017-09-08 RX ORDER — CEPHALEXIN 250 MG/1
250 CAPSULE ORAL 3 TIMES DAILY
Qty: 30 CAPSULE | Refills: 0 | Status: SHIPPED | OUTPATIENT
Start: 2017-09-08 | End: 2017-09-08 | Stop reason: DRUGHIGH

## 2017-09-08 RX ORDER — CEPHALEXIN 500 MG/1
500 CAPSULE ORAL 2 TIMES DAILY
Qty: 20 CAPSULE | Refills: 0 | Status: ON HOLD | OUTPATIENT
Start: 2017-09-08 | End: 2017-09-28 | Stop reason: ALTCHOICE

## 2017-09-08 NOTE — PROGRESS NOTES
HPI:    Patient ID: Estela Fajardo is a 66year old female. HPI  Patient presents with: Foot Injury: pt injured right foot at home, swelling is better, pt has pain when she moves    Review of Systems   Constitutional: Negative.     Skin: Positive for Rfl:    QUEtiapine Fumarate (SEROQUEL) 50 MG Oral Tab nightly. Disp:  Rfl:    latanoprost (XALATAN) 0.005 % Ophthalmic Solution  Disp:  Rfl:    Sertraline HCl (ZOLOFT) 100 MG Oral Tab Take 300 mg by mouth daily.    Disp:  Rfl:      Allergies:  Clindamycin

## 2017-09-28 ENCOUNTER — APPOINTMENT (OUTPATIENT)
Dept: GENERAL RADIOLOGY | Facility: HOSPITAL | Age: 72
DRG: 190 | End: 2017-09-28
Attending: EMERGENCY MEDICINE
Payer: MEDICARE

## 2017-09-28 ENCOUNTER — HOSPITAL ENCOUNTER (INPATIENT)
Facility: HOSPITAL | Age: 72
LOS: 4 days | Discharge: NURSING, REHAB OR LONG-TERM CARE | DRG: 190 | End: 2017-10-02
Attending: EMERGENCY MEDICINE | Admitting: HOSPITALIST
Payer: MEDICARE

## 2017-09-28 DIAGNOSIS — N30.00 ACUTE CYSTITIS WITHOUT HEMATURIA: ICD-10-CM

## 2017-09-28 DIAGNOSIS — J98.01 ACUTE BRONCHOSPASM: Primary | ICD-10-CM

## 2017-09-28 DIAGNOSIS — R26.2 INABILITY TO AMBULATE DUE TO MULTIPLE JOINTS: ICD-10-CM

## 2017-09-28 PROCEDURE — 71010 XR CHEST AP PORTABLE  (CPT=71010): CPT | Performed by: EMERGENCY MEDICINE

## 2017-09-28 PROCEDURE — 99291 CRITICAL CARE FIRST HOUR: CPT | Performed by: INTERNAL MEDICINE

## 2017-09-28 PROCEDURE — 99223 1ST HOSP IP/OBS HIGH 75: CPT | Performed by: HOSPITALIST

## 2017-09-28 RX ORDER — LATANOPROST 50 UG/ML
1 SOLUTION/ DROPS OPHTHALMIC NIGHTLY
Status: DISCONTINUED | OUTPATIENT
Start: 2017-09-29 | End: 2017-10-02

## 2017-09-28 RX ORDER — SODIUM PHOSPHATE, DIBASIC AND SODIUM PHOSPHATE, MONOBASIC 7; 19 G/133ML; G/133ML
1 ENEMA RECTAL ONCE AS NEEDED
Status: DISCONTINUED | OUTPATIENT
Start: 2017-09-28 | End: 2017-10-02

## 2017-09-28 RX ORDER — ALBUTEROL SULFATE 2.5 MG/3ML
2.5 SOLUTION RESPIRATORY (INHALATION)
Status: DISCONTINUED | OUTPATIENT
Start: 2017-09-29 | End: 2017-10-02

## 2017-09-28 RX ORDER — MEMANTINE HYDROCHLORIDE 10 MG/1
10 TABLET ORAL 2 TIMES DAILY
Status: DISCONTINUED | OUTPATIENT
Start: 2017-09-29 | End: 2017-10-02

## 2017-09-28 RX ORDER — DOCUSATE SODIUM 100 MG/1
100 CAPSULE, LIQUID FILLED ORAL 2 TIMES DAILY
Status: DISCONTINUED | OUTPATIENT
Start: 2017-09-28 | End: 2017-10-02

## 2017-09-28 RX ORDER — SODIUM CHLORIDE 0.9 % (FLUSH) 0.9 %
3 SYRINGE (ML) INJECTION AS NEEDED
Status: DISCONTINUED | OUTPATIENT
Start: 2017-09-28 | End: 2017-10-02

## 2017-09-28 RX ORDER — LORAZEPAM 1 MG/1
1 TABLET ORAL EVERY 4 HOURS PRN
Status: DISCONTINUED | OUTPATIENT
Start: 2017-09-28 | End: 2017-10-02

## 2017-09-28 RX ORDER — METHYLPREDNISOLONE SODIUM SUCCINATE 40 MG/ML
40 INJECTION, POWDER, LYOPHILIZED, FOR SOLUTION INTRAMUSCULAR; INTRAVENOUS EVERY 12 HOURS
Status: DISCONTINUED | OUTPATIENT
Start: 2017-09-28 | End: 2017-09-28

## 2017-09-28 RX ORDER — ONDANSETRON 2 MG/ML
4 INJECTION INTRAMUSCULAR; INTRAVENOUS EVERY 6 HOURS PRN
Status: DISCONTINUED | OUTPATIENT
Start: 2017-09-28 | End: 2017-10-02

## 2017-09-28 RX ORDER — QUETIAPINE 100 MG/1
100 TABLET, FILM COATED ORAL NIGHTLY
Status: DISCONTINUED | OUTPATIENT
Start: 2017-09-29 | End: 2017-10-02

## 2017-09-28 RX ORDER — POLYETHYLENE GLYCOL 3350 17 G/17G
17 POWDER, FOR SOLUTION ORAL DAILY PRN
Status: DISCONTINUED | OUTPATIENT
Start: 2017-09-28 | End: 2017-10-02

## 2017-09-28 RX ORDER — IPRATROPIUM BROMIDE AND ALBUTEROL SULFATE 2.5; .5 MG/3ML; MG/3ML
3 SOLUTION RESPIRATORY (INHALATION) EVERY 4 HOURS PRN
Status: DISCONTINUED | OUTPATIENT
Start: 2017-09-28 | End: 2017-10-02

## 2017-09-28 RX ORDER — SODIUM CHLORIDE 9 MG/ML
INJECTION, SOLUTION INTRAVENOUS ONCE
Status: COMPLETED | OUTPATIENT
Start: 2017-09-28 | End: 2017-09-29

## 2017-09-28 RX ORDER — BISACODYL 10 MG
10 SUPPOSITORY, RECTAL RECTAL
Status: DISCONTINUED | OUTPATIENT
Start: 2017-09-28 | End: 2017-10-02

## 2017-09-28 RX ORDER — DICYCLOMINE HYDROCHLORIDE 10 MG/1
10 CAPSULE ORAL 2 TIMES DAILY
Status: DISCONTINUED | OUTPATIENT
Start: 2017-09-29 | End: 2017-10-02

## 2017-09-28 RX ORDER — METHYLPREDNISOLONE SODIUM SUCCINATE 40 MG/ML
40 INJECTION, POWDER, LYOPHILIZED, FOR SOLUTION INTRAMUSCULAR; INTRAVENOUS ONCE
Status: COMPLETED | OUTPATIENT
Start: 2017-09-28 | End: 2017-09-28

## 2017-09-28 RX ORDER — PREDNISONE 20 MG/1
40 TABLET ORAL
Status: DISCONTINUED | OUTPATIENT
Start: 2017-09-29 | End: 2017-09-30

## 2017-09-28 RX ORDER — HEPARIN SODIUM 5000 [USP'U]/ML
5000 INJECTION, SOLUTION INTRAVENOUS; SUBCUTANEOUS EVERY 12 HOURS SCHEDULED
Status: DISCONTINUED | OUTPATIENT
Start: 2017-09-28 | End: 2017-10-02

## 2017-09-28 RX ORDER — DONEPEZIL HYDROCHLORIDE 10 MG/1
10 TABLET, FILM COATED ORAL NIGHTLY
Status: DISCONTINUED | OUTPATIENT
Start: 2017-09-29 | End: 2017-10-02

## 2017-09-28 RX ORDER — ACETAMINOPHEN 325 MG/1
650 TABLET ORAL EVERY 6 HOURS PRN
Status: DISCONTINUED | OUTPATIENT
Start: 2017-09-28 | End: 2017-10-02

## 2017-09-28 RX ORDER — POTASSIUM CHLORIDE 20 MEQ/1
40 TABLET, EXTENDED RELEASE ORAL EVERY 4 HOURS
Status: COMPLETED | OUTPATIENT
Start: 2017-09-28 | End: 2017-09-29

## 2017-09-28 RX ORDER — QUETIAPINE 25 MG/1
50 TABLET, FILM COATED ORAL NIGHTLY
Status: DISCONTINUED | OUTPATIENT
Start: 2017-09-29 | End: 2017-10-02

## 2017-09-28 RX ORDER — IPRATROPIUM BROMIDE AND ALBUTEROL SULFATE 2.5; .5 MG/3ML; MG/3ML
3 SOLUTION RESPIRATORY (INHALATION) ONCE
Status: COMPLETED | OUTPATIENT
Start: 2017-09-28 | End: 2017-09-28

## 2017-09-28 NOTE — ED PROVIDER NOTES
Patient Seen in: [facility] AND [facility] Emergency Department    History   Patient presents with:  Fall (musculoskeletal, neurologic)    Stated Complaint: fall/weakness    HPI    73 yo F with PMH fibromyalgia, chronic BLE weakness 2/2 spinal stenosis with near b Tab,  Take 1 tablet by mouth every 4 (four) hours as needed. LORazepam 1 MG Oral Tab,  Take 1 tablet (1 mg total) by mouth every 4 (four) hours as needed for Anxiety.    Albuterol Sulfate  (90 Base) MCG/ACT Inhalation Aero Soln,  Inhale 2 puffs int elements reviewed from today and agreed except as otherwise stated in HPI.     Physical Exam   ED Triage Vitals  BP: 138/69 [09/28/17 1712]  Pulse: 92 [09/28/17 1712]  Resp: 12 [09/28/17 1712]  Temp: 98.3 °F (36.8 °C) [09/28/17 1715]  Temp src: Oral [09/28/ components within normal limits   CBC W/ DIFFERENTIAL - Abnormal; Notable for the following:     Neutrophil Absolute 7.8 (*)     Lymphocyte Absolute 0.2 (*)     All other components within normal limits   CK CREATINE KINASE (NOT CREATININE) - Normal   TROP throughout the thoracic and upper lumbar spine. OTHER: Negative. Dictated by (CST): Hever Levy MD on 9/28/2017 at 17:59     Approved by (CST): Hever Levy MD on 9/28/2017 at 18:00          CONCLUSION:  1.   Limited assessment of the lung apices due to

## 2017-09-29 PROCEDURE — 99232 SBSQ HOSP IP/OBS MODERATE 35: CPT | Performed by: INTERNAL MEDICINE

## 2017-09-29 PROCEDURE — 99233 SBSQ HOSP IP/OBS HIGH 50: CPT | Performed by: HOSPITALIST

## 2017-09-29 RX ORDER — MAGNESIUM OXIDE 400 MG (241.3 MG MAGNESIUM) TABLET
400 TABLET ONCE
Status: COMPLETED | OUTPATIENT
Start: 2017-09-29 | End: 2017-09-29

## 2017-09-29 RX ORDER — PREDNISONE 20 MG/1
TABLET ORAL
Qty: 7 TABLET | Refills: 0 | Status: SHIPPED | OUTPATIENT
Start: 2017-09-29 | End: 2017-01-01 | Stop reason: ALTCHOICE

## 2017-09-29 NOTE — PROGRESS NOTES
[facility]    [facility]    Marky Meek Patient Status:  Inpatient    1945 MRN [id number]   Location [facility] 5SW/SE Attending Mayela Shea MD   Hosp Day # 1 PCP Lucy Robertson DO     Subjective:     Respiratory: with kyphoplasty changes are present throughout the thoracic and lumbar spine. Ekg 12-lead    Result Date: 9/28/2017  ECG Report  Interpretation  -------------------------- Sinus Rhythm Leftward axis - Negative T-waves - Anterior ischemia.  ABNORMAL W

## 2017-09-29 NOTE — DIETARY NOTE
ADULT NUTRITION INITIAL ASSESSMENT    Pt is at moderate nutrition risk. Pt does not meet malnutrition criteria. RECOMMENDATIONS TO MD:  Recommendations to MD: Monitor weights after d/c.      NUTRITION DIAGNOSIS/PROBLEM:  Underweight, related to inadeq cage region per visual exam.    - Fluid Accumulation: no edema noted per visual exam  .  - Skin Integrity: reddened heel  - Papi score 15    NUTRITION PRESCRIPTION:  Diet: Regular/General  Oral Supplements Chocolate [place] Instant Breakfast with L, D

## 2017-09-29 NOTE — PROGRESS NOTES
Donepezil HCl (ARICEPT ODT) disintegrating tab 10 mg is Non-Formulary Medication &  Auto-Substituted to Donepezil 10mg tablet  Per P&T PROTOCOL

## 2017-09-29 NOTE — PLAN OF CARE
Problem: SAFETY ADULT - FALL  Goal: Free from fall injury  INTERVENTIONS:  - Assess pt frequently for physical needs  - Identify cognitive and physical deficits and behaviors that affect risk of falls.   - [place] fall precautions as indicated by assessme

## 2017-09-29 NOTE — CONSULTS
Pulmonary/Critical Care Consult Note    HPI:   Celso Frankel is a 74year old female with Patient presents with:  Fall (musculoskeletal, neurologic)    Brandon Vidales DO    Pt is 75 yo with spinal stenosis, weakness, limited mobility, former smoker wh injection 5,000 Units 5,000 Units Subcutaneous 2 times per day   acetaminophen (TYLENOL) tab 650 mg 650 mg Oral Q6H PRN   ondansetron HCl (ZOFRAN) injection 4 mg 4 mg Intravenous Q6H PRN   docusate sodium (COLACE) cap 100 mg 100 mg Oral BID   PEG 3350 (SARAHY (1.651 m)   SpO2 94%   No intake or output data in the 24 hours ending 09/28/17 2348  NAD  A&Ox3  Head AT/NC  Anicteric  Lung cta  CV reg  Abd soft NT/ND  Ext No edema  Skin No rash, scattered brusing  Psych Normal mood      LABS    Lab Results  Component

## 2017-09-29 NOTE — PLAN OF CARE
came to visit and noticed DNR band on his wife's wrist.  States no one talked to him about it. In reading the notes dr Morenita Pearson had a conversation with the patient but  states she is not capable of her own decisions.   Band removed, Dr Abril Kuhn notif

## 2017-09-29 NOTE — OCCUPATIONAL THERAPY NOTE
OCCUPATIONAL THERAPY EVALUATION - INPATIENT     Room Number: 539/539-A  Evaluation Date: 9/29/2017  Type of Evaluation: Initial  Presenting Problem:  (Fall)    Physician Order: IP Consult to Occupational Therapy  Reason for Therapy: ADL/IADL Dysfunction an to ambulate due to multiple joints      Past Medical History  Past Medical History:   Diagnosis Date   • Ankle fracture, left 1985    ORIF   • Carpal tunnel syndrome 2009    L CTS surgery   • Chronic fatigue    • Colon polyp 1-   • Diverticulosis 1- ASSESSMENT  AM-PAC ‘6-Clicks’ Inpatient Daily Activity Short Form  How much help from another person does the patient currently need…  -   Putting on and taking off regular lower body clothing?: A Lot  -   Bathing (including washing, rinsing, drying)?: A L

## 2017-09-29 NOTE — PROGRESS NOTES
[facility]  Progress Note     Rena Clifford  : 1945    Status: Inpatient  Day #: 1    Attending: Nohemi Lucas MD  PCP: Fraknie Weller, DO      Assessment and Plan     Acute respiratory failure with hypoxia  Suspected COPD with bron CREATSERUM  0.65  0.66   GFRAA  >60  >60   GFRNAA  >60  >60   CA  8.9  8.7   ALB  3.4*   --    NA  138  138   K  3.6  4.5  4.5   CL  101  107   CO2  29  27   GLU  135*  117*   MG  1.7*   --    BILT  0.5   --    AST  33   --    ALT  20   --    ALKPHO  101

## 2017-09-29 NOTE — H&P
[facility]    PATIENT'S NAME: Charity Flor   ATTENDING PHYSICIAN: Damián Bella MD   PATIENT ACCOUNT#:   [id number]    LOCATION:  [place] [address] [address] [facility]  MEDICAL RECORD #:   [id number]       YOB: 1945  ADMISSION DATE:       09/28/20 significant alcohol intake. Lives with her . Uses a rolling walker to ambulate around, usually independent for basic activities of daily living. REVIEW OF SYSTEMS:  Patient said she has been wheezing for the last few days.   Also she has been

## 2017-09-29 NOTE — PHYSICAL THERAPY NOTE
PHYSICAL THERAPY EVALUATION - INPATIENT     Room Number: 539/539-A  Evaluation Date: 9/29/2017  Type of Evaluation: Initial  Physician Order: PT Eval and Treat    Presenting Problem: acute bronchospasm, fall out of chair  Reason for Therapy: Mobility D rehabilitation    PLAN  PT Treatment Plan: Bed mobility; Endurance; Energy conservation;Patient education;Gait training;Range of motion;Strengthening;Neuromuscular re-educate;Transfer training;Balance training  Rehab Potential : Fair  Frequency (Obs): 5x/wee to walk\"    PHYSICAL THERAPY EXAMINATION     OBJECTIVE  Precautions:  (severe kyphosis, bruises easily)  Fall Risk: High fall risk    WEIGHT BEARING RESTRICTION  Weight Bearing Restriction: None        PAIN ASSESSMENT  Ratin  Location: overall achines assistance  Distance (ft): 5'  Assistive Device: Rolling walker  Pattern: Shuffle;R Foot drag;L Foot drag  Stoop/Curb Assistance: Not tested  Comment : Patient ambulates with max A to find midline/posture and then able to advance LE's but takes very large

## 2017-09-29 NOTE — CM/SW NOTE
CUAUHTEMOC received for dc planning, for home health services. SW attempted to see the pt but she was receiving aide care both times. Per chart review, pt had been sent to [facility] earlier this year, in May.  Pt was then transitioned to [facility], which end

## 2017-09-30 PROCEDURE — 99233 SBSQ HOSP IP/OBS HIGH 50: CPT | Performed by: HOSPITALIST

## 2017-09-30 PROCEDURE — 99232 SBSQ HOSP IP/OBS MODERATE 35: CPT | Performed by: INTERNAL MEDICINE

## 2017-09-30 NOTE — PROGRESS NOTES
[facility]  Progress Note     Brant Jean Baptiste  : 1945    Status: Inpatient  Day #: 2    Attending: Paolo Price MD  PCP: Corey Stover DO      Assessment and Plan     Acute respiratory failure with hypoxia - multifactorial  Suspect BUN  9  9   --    CREATSERUM  0.65  0.66   --    GFRAA  >60  >60   --    GFRNAA  >60  >60   --    CA  8.9  8.7   --    ALB  3.4*   --    --    NA  138  138   --    K  3.6  4.5  4.5   --    CL  101  107   --    CO2  29  27   --    GLU  135*  117*   --

## 2017-09-30 NOTE — PLAN OF CARE
Problem: Patient/Family Goals  Goal: Patient/Family Short Term Goal  Patient's Short Term Goal: free of this oxygen    Interventions:   - O2 per nasal cannula prn  - neb treatment as ordered.  -Encouraged deep breathing exercises  - See additional Care Abelardo

## 2017-09-30 NOTE — PLAN OF CARE
Problem: Patient Centered Care  Goal: Patient preferences are identified and integrated in the patient's plan of care  Interventions:  - What would you like us to know as we care for you?   - Provide timely, complete, and accurate information to patient/fa injury  INTERVENTIONS:  - Assess pt frequently for physical needs  - Identify cognitive and physical deficits and behaviors that affect risk of falls.   - [place] fall precautions as indicated by assessment.  - Educate pt/family on patient safety includin

## 2017-10-01 ENCOUNTER — APPOINTMENT (OUTPATIENT)
Dept: CV DIAGNOSTICS | Facility: HOSPITAL | Age: 72
DRG: 190 | End: 2017-10-01
Attending: HOSPITALIST
Payer: MEDICARE

## 2017-10-01 PROCEDURE — 93306 TTE W/DOPPLER COMPLETE: CPT | Performed by: HOSPITALIST

## 2017-10-01 PROCEDURE — 99233 SBSQ HOSP IP/OBS HIGH 50: CPT | Performed by: INTERNAL MEDICINE

## 2017-10-01 PROCEDURE — 99233 SBSQ HOSP IP/OBS HIGH 50: CPT | Performed by: HOSPITALIST

## 2017-10-01 RX ORDER — CALCITONIN SALMON 200 [IU]/.09ML
1 SPRAY, METERED NASAL DAILY
Status: DISCONTINUED | OUTPATIENT
Start: 2017-10-01 | End: 2017-10-02

## 2017-10-01 RX ORDER — SERTRALINE HYDROCHLORIDE 100 MG/1
300 TABLET, FILM COATED ORAL DAILY
Status: DISCONTINUED | OUTPATIENT
Start: 2017-10-01 | End: 2017-10-02

## 2017-10-01 RX ORDER — PANTOPRAZOLE SODIUM 40 MG/1
40 TABLET, DELAYED RELEASE ORAL
Status: DISCONTINUED | OUTPATIENT
Start: 2017-10-01 | End: 2017-10-02

## 2017-10-01 RX ORDER — BUPROPION HYDROCHLORIDE 150 MG/1
150 TABLET ORAL DAILY
Status: DISCONTINUED | OUTPATIENT
Start: 2017-10-01 | End: 2017-10-02

## 2017-10-01 NOTE — PROGRESS NOTES
Pulmonary/Critical Care Follow Up Note    HPI:   Lizzette Strickland is a 74year old female with Patient presents with:  Fall (musculoskeletal, neurologic)      PCP Reynaldo Baugh, DO  Admission Attending Swapnil Santoro MD    Hospital Day #3    No sob  Cou QUEtiapine Fumarate (SEROQUEL) tab 50 mg, 50 mg, Oral, Nightly  •  albuterol sulfate (VENTOLIN) (2.5 MG/3ML) 0.083% nebulizer solution 2.5 mg, 2.5 mg, Nebulization, QID      Allergies:    Clindamycin             Rash, Shortness of Breath    Comment:Per pt

## 2017-10-01 NOTE — PROGRESS NOTES
[facility]  [facility] Note     Clemencia Armenta  : 1945    Status: Inpatient  Day #: 3    Attending: Jordy Yang MD  PCP: Elzbieta Moore,       Assessment and Plan     Acute respiratory failure with hypoxia - multifactorial  Suspect 31.4   MCHC  34.2  33.5   RDW  13.8  13.9     Recent Labs   Lab  09/28/17   1721  09/29/17   0501  09/30/17   0446   BUN  9  9   --    CREATSERUM  0.65  0.66   --    GFRAA  >60  >60   --    GFRNAA  >60  >60   --    CA  8.9  8.7   --    ALB  3.4*   --    --

## 2017-10-01 NOTE — PLAN OF CARE
Problem: Patient Centered Care  Goal: Patient preferences are identified and integrated in the patient's plan of care  Interventions:  - What would you like us to know as we care for you?   - Provide timely, complete, and accurate information to patient/fa injury  INTERVENTIONS:  - Assess pt frequently for physical needs  - Identify cognitive and physical deficits and behaviors that affect risk of falls.   - [place] fall precautions as indicated by assessment.  - Educate pt/family on patient safety includin

## 2017-10-01 NOTE — PLAN OF CARE
Problem: SAFETY ADULT - FALL  Goal: Free from fall injury  INTERVENTIONS:  - Assess pt frequently for physical needs  - Identify cognitive and physical deficits and behaviors that affect risk of falls.   - [place] fall precautions as indicated by assessme

## 2017-10-01 NOTE — PHYSICAL THERAPY NOTE
PHYSICAL THERAPY TREATMENT NOTE - INPATIENT    Room Number: 539/539-A       Presenting Problem: acute bronchospasm, fall out of chair    Problem List  Principal Problem:    Acute bronchospasm  Active Problems:    Acute cystitis without hematuria    Inabil Sitting: Fair +  Dynamic Sitting: Fair -           Static Standing: Poor -  Dynamic Standing: Poor -    ACTIVITY TOLERANCE  O2 Saturation at rest 90% and with activity 86%  Room air  O2 Device: Nasal cannula  Heart Rate: at rest 98 and with activity 98 to rehab and get stronger   Goal #1 Patient is able to demonstrate supine - sit EOB @ level: moderate assistance   Goal #1   Current Status  maxA   Goal #2 Patient is able to demonstrate transfers Sit to/from Stand at assistance level: moderate assistance

## 2017-10-02 VITALS
HEART RATE: 101 BPM | DIASTOLIC BLOOD PRESSURE: 74 MMHG | WEIGHT: 103.5 LBS | HEIGHT: 65 IN | RESPIRATION RATE: 18 BRPM | TEMPERATURE: 100 F | SYSTOLIC BLOOD PRESSURE: 102 MMHG | OXYGEN SATURATION: 93 % | BODY MASS INDEX: 17.25 KG/M2

## 2017-10-02 PROCEDURE — 99239 HOSP IP/OBS DSCHRG MGMT >30: CPT | Performed by: HOSPITALIST

## 2017-10-02 PROCEDURE — 99232 SBSQ HOSP IP/OBS MODERATE 35: CPT | Performed by: INTERNAL MEDICINE

## 2017-10-02 RX ORDER — LORAZEPAM 1 MG/1
1 TABLET ORAL EVERY 4 HOURS PRN
Qty: 30 TABLET | Refills: 0 | Status: SHIPPED | OUTPATIENT
Start: 2017-10-02 | End: 2017-01-01

## 2017-10-02 NOTE — PROGRESS NOTES
[facility]  Progress Note     Rena Clifford  : 1945    Status: Inpatient  Day #: 4    Attending: Nohemi Lucas MD  PCP: Frankie Weller, DO      Assessment and Plan     Acute respiratory failure with hypoxia - multifactorial  Suspect hours.   Recent Labs   Lab  09/30/17   0446  10/02/17   0452   MG  1.9   --    TSH   --   2.39   B12   --   927*                Medications     • calcitonin (salmon)  1 spray Alternating Nares Daily   • Pantoprazole Sodium  40 mg Oral QAM AC   • Sertraline

## 2017-10-02 NOTE — CM/SW NOTE
CHARANJIT spoke w/ pt's /Dangelo who stated plan is for pt to go to [facility] for rehab as he does not believe pt will be safe if she goes straight home. CHARANJIT left message w/ [facility] Liaison.     Pt will require insurance authorization prior to d/c from hospital.

## 2017-10-02 NOTE — DISCHARGE SUMMARY
[facility]  Discharge Summary     Simran Calvillo  : 1945    Status: Inpatient  Day #: 4    Attending: Aniya Eastman MD  PCP: Markell Griffin DO     Date of Admission: 2017  Date of Discharge: 10/2/2017     Hospital Discharge Snehal Oral, resp. rate 18, height 5' 5\" (1.651 m), weight 103 lb 8 oz (46.9 kg), SpO2 91 %.   General:  Alert, no distress  HEENT:  Normocephalic, atraumatic  Neck:  Supple, symmetrical  Cardiac:  Regular rate, regular rhythm  Pulmonary:  Clear to auscultation b as: XALATAN      Place 1 drop into both eyes nightly. Refills:  0     LORazepam 1 MG Tabs  Commonly known as:  ATIVAN      Take 1 tablet (1 mg total) by mouth every 4 (four) hours as needed for Anxiety.    Quantity:  30 tablet  Refills:  0     Memantine

## 2017-10-02 NOTE — PROGRESS NOTES
Pulmonary/Critical Care Follow Up Note    HPI:   Mikhail Bello is a 74year old female with Patient presents with:  Fall (musculoskeletal, neurologic)      PCP Harika Crowell DO  Admission Attending Jesse Flores MD    Hospital Day #4    No sob  Cou tab 10 mg, 10 mg, Oral, Nightly  •  latanoprost (XALATAN) 0.005 % ophthalmic solution 1 drop, 1 drop, Both Eyes, Nightly  •  LORazepam (ATIVAN) tab 1 mg, 1 mg, Oral, Q4H PRN  •  Memantine HCl (NAMENDA) tab 10 mg, 10 mg, Oral, BID  •  QUEtiapine Fumarate (S

## 2017-10-02 NOTE — OCCUPATIONAL THERAPY NOTE
Attempted to see pt for OT session, however RT just came into room to provide breathing tx. Will continue to follow.

## 2017-10-02 NOTE — PLAN OF CARE
Problem: Patient Centered Care  Goal: Patient preferences are identified and integrated in the patient's plan of care  Interventions:  - What would you like us to know as we care for you?   - Provide timely, complete, and accurate information to patient/fa CO2 retention   Outcome: Progressing      Problem: SAFETY ADULT - FALL  Goal: Free from fall injury  INTERVENTIONS:  - Assess pt frequently for physical needs  - Identify cognitive and physical deficits and behaviors that affect risk of falls.   - [place]

## 2017-10-02 NOTE — PLAN OF CARE
Problem: Patient Centered Care  Goal: Patient preferences are identified and integrated in the patient's plan of care  Interventions:  - What would you like us to know as we care for you?   - Provide timely, complete, and accurate information to patient/fa ADULT - FALL  Goal: Free from fall injury  INTERVENTIONS:  - Assess pt frequently for physical needs  - Identify cognitive and physical deficits and behaviors that affect risk of falls.   - [place] fall precautions as indicated by assessment.  - Educate p

## 2017-10-03 ENCOUNTER — SNF VISIT (OUTPATIENT)
Dept: INTERNAL MEDICINE CLINIC | Facility: SKILLED NURSING FACILITY | Age: 72
End: 2017-10-03

## 2017-10-03 VITALS
TEMPERATURE: 97 F | OXYGEN SATURATION: 95 % | BODY MASS INDEX: 17 KG/M2 | SYSTOLIC BLOOD PRESSURE: 136 MMHG | WEIGHT: 102 LBS | DIASTOLIC BLOOD PRESSURE: 74 MMHG | HEART RATE: 78 BPM | RESPIRATION RATE: 18 BRPM

## 2017-10-03 DIAGNOSIS — M81.0 KYPHOSIS DUE TO OSTEOPOROSIS: Primary | ICD-10-CM

## 2017-10-03 DIAGNOSIS — J44.9 CHRONIC OBSTRUCTIVE PULMONARY DISEASE, UNSPECIFIED COPD TYPE (HCC): ICD-10-CM

## 2017-10-03 DIAGNOSIS — Z99.81 DEPENDENCE ON SUPPLEMENTAL OXYGEN: ICD-10-CM

## 2017-10-03 DIAGNOSIS — F32.A DEPRESSION, UNSPECIFIED DEPRESSION TYPE: ICD-10-CM

## 2017-10-03 DIAGNOSIS — F41.9 ANXIETY DISORDER, UNSPECIFIED TYPE: ICD-10-CM

## 2017-10-03 DIAGNOSIS — M40.10 KYPHOSIS DUE TO OSTEOPOROSIS: Primary | ICD-10-CM

## 2017-10-03 DIAGNOSIS — F41.1 GENERALIZED ANXIETY DISORDER: ICD-10-CM

## 2017-10-03 PROCEDURE — 99310 SBSQ NF CARE HIGH MDM 45: CPT | Performed by: NURSE PRACTITIONER

## 2017-10-03 NOTE — PROGRESS NOTES
Wogn Stage  : 1945  Age 74year old  female patient is admitted to [place] [address] for strengthening and rehabilitation on 10/2/2017    Admitted to [address] [place] [address] Avenue: 2017-10/2/2017    Chief complaint: Acute respiratory failure, suspected COPD with bron reflux    • Fibromyalgia    • Glaucoma    • Osteoarthritis    • Spinal stenosis    • Sternal fracture 02/ 2011    from fall      Past Surgical History:  2009: CARPAL TUNNEL RELEASE      Comment: justino WEBB CTS surgery  2009: CATARACT EXTRACTION  1-: (FIORICET) -40 MG Oral Tab Take 1 tablet by mouth every 4 (four) hours as needed. Disp: 20 tablet Rfl: 0   Albuterol Sulfate  (90 Base) MCG/ACT Inhalation Aero Soln Inhale 2 puffs into the lungs every 6 (six) hours as needed for Wheezing.  Disp complaint  PSYCHE: +depression and anxiety history   HEMATOLOGY:denies excessive bleeding  ENDOCRINE: denies excessive thirst or urination; denies unexpected wt gain or wt loss  ALLERGY/IMM.: denies food or seasonal allergies      PHYSICAL EXAM:  GENERAL H need outpatient PFT  - BNP elevated (157).  Echo normal EF, grade 1 diastolic dysfunction  -CBC and BMP 10/4  -Albuterol inhaler PRN     2. Musculoskeletal deconditioning  -H/o falls  - lives with , having difficulty caring for her  - PT/OT   - will

## 2017-10-05 NOTE — PROGRESS NOTES
Cherise Jeronimo  : 1945  Age 74year old  female patient is admitted to [place] [address] for strengthening and rehabilitation on 10/2/2017    Admitted to [address] [place] [address] Avenue: 2017-10/2/2017     Chief complaint: Acute respiratory failure, suspected COPD with bro ALLERGIES:    Clindamycin             Rash, Shortness of Breath    Comment:Per pt received this medicine and bactrim in ER             not sure which caused reaction.   Bactrim [Sulfametho*    Rash, Shortness of Breath    Comment:Per pt received bactrim a 300 mg by mouth daily.    Disp:  Rfl:    Spacer/Aero-Holding Chambers (AEROCHAMBER PLUS CHETAN-VU) Does not apply Misc  Disp:  Rfl:        VITALS:  /64   Pulse 62   Temp (!) 97.2 °F (36.2 °C)   Resp 18   Wt 102 lb (46.3 kg)   BMI 16.97 kg/m²       REVIEW cyanosis, clubbing or edema  NEUROLOGIC: follows commands  PSYCHIATRIC: alert to name and place, episodes of forgetfulness      MEDICAL [facility]   involved in care     DIAGNOSTICS REVIEWED AT THIS VISIT: Inpatient Epic notes reviewed constipation      9. Glaucoma  -Continue latanoprost eye gtts     This is a 15 minute visit and greater than 50% of the time was spent counseling the patient and/or coordinating care.     HOLLEY Yoon  10/05/17   1:16 PM

## 2017-10-06 NOTE — PROGRESS NOTES
John Kelley  : 1945  Age 74year old  female patient is admitted to [place] [address] for strengthening and rehabilitation on 10/2/2017    Admitted to [facility] on: 2017-10/2/2017    Chief complaint: Acute respiratory failure, suspect TEAM: Care plan next week. Lives at home with      CURRENT MEDICATIONS: Reviewed and updated     Current Outpatient Prescriptions:  LORazepam 1 MG Oral Tab Take 1 tablet (1 mg total) by mouth every 4 (four) hours as needed for Anxiety.  Disp: 30 tabl skin lesions or rashes  WOUNDS: Right calf skin tears   EYES:no visual complaints or deficits  HENT: denies nasal congestion, sinus pain or sore throat;  RESPIRATORY: denies shortness of breath, wheezing or cough   CARDIOVASCULAR:denies chest pain, no palp 13.2 HCT 40.1      BMP: CR 0.65, BUN 33  Glucose 67, CA 9.9    K 4.0  Chloride 101 Co2 28 eGFR >60     Repeat labs on 10/9/2017     CXR on 9/28:   Limited assessment of the lung apices due to patient positioning.  Chronic interstitial changes a

## 2017-10-09 NOTE — PROGRESS NOTES
Becca Judith  : 1945  Age 74year old  female patient is admitted to [place] [address] for strengthening and rehabilitation on 10/2/2017    Admitted to [facility] on: 2017-10/2/2017    Chief complaint: Acute respiratory failure, suspect Seen by Dr Mesa Public today with orders to start Symbicort 2 puffs BID-orders in vision. Vitals stable, has been afebrile in rehab.      ALLERGIES:    Clindamycin             Rash, Shortness of Breath    Comment:Per pt received this medicine and bactrim in ER Donepezil HCl 10 MG Oral Tablet Dispersible nightly. Disp:  Rfl:    Memantine HCl 10 MG Oral Tab 2 (two) times daily. Disp:  Rfl:    QUEtiapine Fumarate (SEROQUEL) 100 MG Oral Tab nightly.    Disp:  Rfl:    QUEtiapine Fumarate (SEROQUEL) 50 MG Oral Ta CARDIOVASCULAR: S1, S2 normal, RRR; no S3, no S4;   ABDOMEN:  normal active BS+, soft, nondistended; no organomegaly, no masses; no bruits; nontender, no guarding, no rebound tenderness.   :no suprapubic distension  LYMPHATIC:no lymphedema  MUSCULOSKELE and BMP ordered for 10/12     5. Lumbar stenosis, fibromyalgia, osteoporosis , chronic pain syndrome  -PT/OT  -Physiatry to follow in rehab  -Denies acute pain currently   -Fioricet PRN     6.  Anxiety-stable   -Continue seroquel, zoloft, bupropion   -Psych

## 2017-10-10 NOTE — PROGRESS NOTES
Charissa Alves  : 1945  Age 74year old  female patient is admitted to [place] [address] for strengthening and rehabilitation on 10/2/2017    Admitted to [facility] on: 2017-10/2/2017     Chief complaint: Acute respiratory failure, suspec in Er not             sure which one caused the reaction.       CODE STATUS:  Full Code     ADVANCED CARE PLANNING TEAM: Care plan 10/11   Lives at home with .      CURRENT MEDICATIONS  20 meq Klor con powder QD x 3 days started 10/9    Current Outp REVIEW OF SYSTEMS:  GENERAL HEALTH:feels well otherwise  SKIN: denies any unusual skin lesions or rashes  WOUNDS: Right calf skin tears   EYES:no visual complaints or deficits  HENT: denies nasal congestion, sinus pain or sore throat;  RESPIRATORY: d care     DIAGNOSTICS REVIEWED AT THIS VISIT: Inpatient Epic notes reviewed  Stool for C-diff sent 10/9    CBC and BMP from 10/9//2017     WBC: 8.70, RBC 4.16 HGB 13.1 HCT 39.5      BMP: CR 0.62, BUN 19  Glucose 67, CA 9.3   K 3.1  Chloride 101    9. Glaucoma  -Continue latanoprost eye gtts  10.  Diarrhea-has since stopped as of 10/10  -Stool  sent to rule out C-Diff on 10/9  -Handwashing with soap and water education to patient and staff  -If sample is negative for C-Diff and still having loose

## 2017-10-11 NOTE — PROGRESS NOTES
Hall Herson  : 1945  Age 74year old  female patient is admitted to [place] [address] for strengthening and rehabilitation on 10/2/2017     Admitted to [facility] on: 2017-10/2/2017     Chief complaint: Acute respiratory failure, suspe Comment:Per pt received this medicine and bactrim in ER             not sure which caused reaction.   Bactrim [Sulfametho*    Rash, Shortness of Breath    Comment:Per pt received bactrim and clindamycin in Er not             sure which one caused the reacti (AEROCHAMBER PLUS CHETAN-VU) Does not apply Misc  Disp:  Rfl:      GENERAL HEALTH:feels well otherwise  SKIN: denies any unusual skin lesions or rashes  WOUNDS: Right calf skin tears   EYES:no visual complaints or deficits  HENT: denies nasal congestion, sinu MAKING  Capable   involved in care     DIAGNOSTICS REVIEWED AT THIS VISIT: Inpatient Epic notes reviewed  Stool for C-diff NEGATIVE      CBC and BMP from 10/9//2017     WBC: 8.70, RBC 4.16 HGB 13.1 HCT 39.5      BMP: CR 0.62, BUN 19  Glucose dicyclomine BID  -Senna tabs QHS: restarted since diarrhea stopped   -Bisacodyl PRN constipation      9. Glaucoma  -Continue latanoprost eye gtts  10.  Diarrhea-has since stopped as of 10/10  -Stool  sent to rule out C-Diff on 10/9= NEGATIVE   -Handwashing

## 2017-10-16 NOTE — PROGRESS NOTES
Wong Stage  : 1945  Age 74year old  female patient is admitted to [place] [address] for strengthening and rehabilitation on 10/2/2017  Admitted to [facility] on: 2017-10/2/2017     Chief complaint: Acute respiratory failure, suspecte Breath    Comment:Per pt received this medicine and bactrim in ER             not sure which caused reaction.   Bactrim [Sulfametho*    Rash, Shortness of Breath    Comment:Per pt received bactrim and clindamycin in Er not             sure which one caused (AEROCHAMBER PLUS CHETAN-VU) Does not apply Misc  Disp:  Rfl:        VITALS:  /67   Pulse 90   Temp 98 °F (36.7 °C)   Resp 18   Wt 101 lb 3.2 oz (45.9 kg)   SpO2 96%   BMI 16.84 kg/m²       REVIEW OF SYSTEMS:  GENERAL HEALTH:feels well otherwise  SKIN: cyanosis, clubbing or edema  NEUROLOGIC: follows commands  PSYCHIATRIC: alert to name and place, episodes of forgetfulness      MEDICAL [facility]   involved in care     DIAGNOSTICS REVIEWED AT THIS VISIT: Inpatient Epic notes reviewed -Continue seroquel, zoloft, bupropion   -Psych can see in rehab  -Monitor behavior  -Ativan PRN     7. GERD  -Continue nexium   -Sit up for all meals     8.  History of IBS  -Continue dicyclomine BID  -Senna tabs QHS: restarted since diarrhea stopped   -B

## 2017-10-19 NOTE — PROGRESS NOTES
Brant Jean Baptiste  : 1945  Age 74year old  female patient is admitted to [place] [address] for strengthening and rehabilitation on 10/2/2017  Admitted to [facility] on: 2017-10/2/2017     Chief complaint: Acute respiratory failure, suspecte which one caused the reaction.     CODE STATUS:  Full Code    ADVANCED CARE PLANNING TEAM: Likely d/c on 10/23 home with     CURRENT MEDICATIONS     Current Outpatient Prescriptions:  Budesonide-Formoterol Fumarate 160-4.5 MCG/ACT Inhalation Aerosol HEALTH:feels well otherwise  SKIN: denies any unusual skin lesions or rashes  WOUNDS: Right calf skin tears   EYES:no visual complaints or deficits  HENT: denies nasal congestion, sinus pain or sore throat;  RESPIRATORY: denies shortness of breath, wheezin Inpatient Epic notes reviewed  Stool for C-diff NEGATIVE    UA C & S on 10/17: SG 1.015 6.0 Negative to all aspects     CBC and BMP from 10/16/2017     WBC: 7.02, RBC 3.49 HGB 10.7  HCT 33.5      BMP: CR 0.54, BUN 16  Glucose 102, CA 8.9   K 3 tabsQHS: restarted since diarrhea stopped   -Bisacodyl PRN constipation      9. Glaucoma  -Continue latanoprost eye gtts  10.  Diarrhea-has since stopped as of 10/10  -Stool  sent to rule out C-Diff on 10/9= NEGATIVE   -Handwashing with soap and water educa

## 2017-10-27 NOTE — PROGRESS NOTES
Rena Clifford, 9/11/1945, 74year old, female is being discharged from [place] [address] on Saturday 10/28/2017    DISCHARGE SUMMARY    Date of Admission:10/2/2017    Date of Discharge: 10/28/2017                               Admitting Diagnoses:Acute resp sore throat;  RESPIRATORY: denies shortness of breath, wheezing or cough   CARDIOVASCULAR:denies chest pain, no palpitations   GI: denies nausea, vomiting, constipation; no rectal bleeding; no heartburn  :no dysuria, urgency or frequency; no vaginal disc Acute respiratory failure with hypoxia - multifactorial  -Suspected COPD with bronchospasm  -Severe kyphoscoliosis  - pulmonary on consult with RT in rehab: added Symbicort 2 puffs BID on 10/9/17  -O2 2 LPM- with activity for home use   - Prednisone 40mg Q coordinating care.     HOLLEY Perez  10/27/2017  2:46 PM

## 2017-11-01 NOTE — TELEPHONE ENCOUNTER
Suhas from [facility] calling to request order for OT. .. please advise and fax to [phone]

## 2017-11-07 PROBLEM — K59.03 DRUG-INDUCED CONSTIPATION: Status: RESOLVED | Noted: 2017-04-07 | Resolved: 2017-01-01

## 2017-11-07 NOTE — PROGRESS NOTES
HPI:    Patient ID: Brant Jean Baptiste is a 74year old female. HPI  Patient presents with:  Back Pain: f/u kyphosis  Follow up since SNF admission and discharge with home therapy. Doing better. Off narcotic medication. Taking all other medications. 300 mg by mouth daily. Disp:  Rfl:    acetaminophen 325 MG Oral Tab Take 2 tablets (650 mg total) by mouth every 6 (six) hours as needed.  Disp: 30 tablet Rfl: 0   Spacer/Aero-Holding Chambers (AEROCHAMBER PLUS CHETAN-VU) Does not apply Misc  Disp:  Rfl:

## 2017-11-11 PROBLEM — J96.01 ACUTE RESPIRATORY FAILURE WITH HYPOXIA (HCC): Status: ACTIVE | Noted: 2017-01-01

## 2017-11-11 NOTE — ED INITIAL ASSESSMENT (HPI)
Patient states was here recently for a fall where she broke her left leg and since rehab has been sob. Patient states she wears 1L nc oxygen at home as needed but felt more sob. Patient was 87% on ra and at 94% after being placed  On 4Lnc.  Patient denies a

## 2017-11-11 NOTE — H&P
51 [facility] Patient Status:  Emergency    1945 MRN [id number]   Location [address] Attending No att. providers found   Hosp Day # 0 PCP DO Alcides Shipley pack-year smoking history. She has never used smokeless tobacco. She reports that she does not drink alcohol or use drugs.     Allergies:    Clindamycin             Rash, Shortness of Breath    Comment:Per pt received this medicine and bactrim in ER salmon, 200 UNIT/ACT Nasal Solution   No No   Si spray by Nasal route daily. latanoprost (XALATAN) 0.005 % Ophthalmic Solution   Yes No   Sig: Place 1 drop into both eyes nightly.         Facility-Administered Medications: None       Review of Systems 11/11/2017   CREATSERUM 0.67 11/11/2017   BUN 12 11/11/2017    11/11/2017   K 3.9 11/11/2017   CL 96 11/11/2017   CO2 29 11/11/2017   GLU 92 11/11/2017   CA 9.0 11/11/2017   PTT 32.9 11/11/2017   INR 1.1 11/11/2017   TROP 0.01 11/11/2017       Imagin

## 2017-11-11 NOTE — ED PROVIDER NOTES
Patient Seen in: [facility] AND [facility] Emergency Department     History   Patient presents with:  Dyspnea MITRA SOB (respiratory)    Stated Complaint: sob    HPI    74year old female presents to the ER with generalized weakness today.    reports that janiya Wheezing. DICYCLOMINE HCL 10 MG Oral Cap,  TAKE 1 CAPSULE TWICE DAILY   NEXIUM 24HR 20 MG Oral Tab EC,  Take 20 mg by mouth 2 (two) times daily.    Spacer/Aero-Holding Chambers (AEROCHAMBER PLUS CHETAN-VU) Does not apply Misc,     BuPROPion HCl ER, SR, 150 M 44.5 kg   SpO2 94%   BMI 19.16 kg/m²         Physical Exam   Constitutional: She is oriented to person, place, and time. She is cooperative. Non-toxic appearance. She does not have a sickly appearance. She does not appear ill. No distress.    HENT:   Head: Absolute 0.4 (*)     All other components within normal limits   PROTHROMBIN TIME (PT) - Normal   PTT, ACTIVATED - Normal   BNP (B TYPE NATRIUERTIC PEPTIDE) - Normal   TROPONIN I - Normal   CBC WITH DIFFERENTIAL WITH PLATELET    Narrative:      The followin of aorta ([address] H/O kyphoplasty; Depression, major, in partial remission ([address] Protein-calorie malnutrition, mild ([address] BMI less than 19,adult; Decubitus ulcer of right heel, stage 1; Bilateral headaches; Kyphosis (acquired) (postural); Depression;  Short Further Inpatient evaluation and treatment will be required.  I personally discussed the results of the above ED workup and a number of associated acute management issues with the patient, and I explained the need for further follow-up evaluation and treatm

## 2017-11-12 NOTE — PHYSICAL THERAPY NOTE
PHYSICAL THERAPY EVALUATION - INPATIENT     Room Number: 336/336-A  Evaluation Date: 11/12/2017  Type of Evaluation: Initial  Physician Order: PT Eval and Treat    Presenting Problem: acute respiratory failure with hypoxia  Reason for Therapy: Mobility mechanics; Coordination; Endurance; Energy conservation;Patient education; Family education;Gait training;Neuromuscular re-educate;Strengthening;Stoop training;Transfer training;Balance training  Rehab Potential : Fair  Frequency (Obs):  (5-7x/wk)       PHYSIC physical assist.  stated he assists with all transfers, bathing, dressing, and ambulation. Pt has 1 platform step to enter, and 7 stairs up, that she has a chair lift available if needed. Per , pt has \"memory problems\".  Pt was receiving HHP Climbing 3-5 steps with a railing?: Total     AM-PAC Score:  Raw Score: 11   PT Approx Degree of Impairment Score: 72.57%   Standardized Score (AM-PAC Scale): 33.86   CMS Modifier (G-Code): CL    FUNCTIONAL ABILITY STATUS  Gait Assessment   Gait Assistan

## 2017-11-12 NOTE — PROGRESS NOTES
[place] [facility]    Progress Note    Kory Brown Patient Status:  Inpatient    1945 MRN [id number]   Location [facility] 3W/SW Attending Mic Hoyt, [address] Day # 1 PCP Shade Bailey DO       Subjective:   Anna Looney puff Inhalation Daily   BuPROPion HCl ER (XL) (WELLBUTRIN XL) 150 MG 24 hr tab 150 mg 150 mg Oral Daily   calcitonin (salmon) (MIACALCIN) nasal solution 1 spray 1 spray Alternating Nares Daily   Dicyclomine HCl (BENTYL) cap 10 mg 10 mg Oral BID PRN   Donep thickening and luminal narrowing of the distal half of the esophagus. This could represent changes of a hiatal hernia however given the craniad extent of this finding, recommend correlation with upper endoscopy to exclude mass.  5.  Pulmonary artery enlarg Pt reports oxyen at home but doesn't use it      Generalized weakness and fall  Consult physical therapy  Check procalcitonin to r/o infection.  UA is neg      Fibromyalgia chronic pain syndrome osteoporosis lumbar stenosis  Rehab consult     Anxiety diso

## 2017-11-12 NOTE — CM/SW NOTE
CHARANJIT following up on d/c planning for the patient. She lives at home with her  in a house with stairs. She has home oxygen and a rollator and a h/o rehab at [facility].   PT pending at this time and the patient will like

## 2017-11-12 NOTE — PROGRESS NOTES
Esomeprazole Magnesium TBEC 20 mg po bid  is Non-Formulary Medication &  Auto-Substituted to pantoprazole 20mg po bid Per P&T PROTOCOL

## 2017-11-13 NOTE — CONSULTS
[facility]    Report of Consultation    John Deleonter Patient Status:  Inpatient    1945 MRN [id number]   Location [facility] 3W/SW Attending Daniel Hernandez, [address] [facility] Day # 2 PCP Javier Diaz, DO     Date of Admissio syndrome 2009    L CTS surgery   • Chronic fatigue    • Colon polyp 1-   • Diverticulosis 1-   • Esophageal reflux    • Fibromyalgia    • Glaucoma     BOTH SIDES    • Osteoarthritis    • Spinal stenosis    • Sternal fracture 02/ 2011    from Fluticasone Furoate-Vilanterol (BREO ELLIPTA) 200-25 MCG/INH inhaler 1 puff 1 puff Inhalation Daily   BuPROPion HCl ER (XL) (WELLBUTRIN XL) 150 MG 24 hr tab 150 mg 150 mg Oral Daily   calcitonin (salmon) (MIACALCIN) nasal solution 1 spray 1 spray Erik Tab nightly. latanoprost (XALATAN) 0.005 % Ophthalmic Solution Place 1 drop into both eyes nightly. Sertraline HCl (ZOLOFT) 100 MG Oral Tab Take 300 mg by mouth daily.          Allergies    Clindamycin             Rash, Shortness of Breath    Commen corneal reflex. VII- face symmetric. VIII- Auditory acuity symmetric. IX,X- Palate elevates in midline. XI- Shoulder shrug symmetric. XII- Tongue in midline, without atrophy. Motor: She is able to move all 4 extremities.   She has slight drift and pro dissection. 2.  Advanced panlobular pulmonary emphysema. 3.  Coronary calcifications. 4.  Nonspecific circumferential wall thickening and luminal narrowing of the distal half of the esophagus.   This could represent changes of a hiatal hernia however given

## 2017-11-13 NOTE — PLAN OF CARE
CARDIOVASCULAR - ADULT    • Maintains optimal cardiac output and hemodynamic stability Progressing    • Absence of cardiac arrhythmias or at baseline Progressing    Hemodynamically stable    METABOLIC/FLUID AND ELECTROLYTES - ADULT    • Glucose maintained

## 2017-11-13 NOTE — CM/SW NOTE
11/13CM-The Patient's RN informed this Writer that per the Patient's MD the Patient will need rehab. This Writer informed the Patient RN that I will have to check with the Patient's insurance. The Patient has [facility].  This Writer has left a message f

## 2017-11-13 NOTE — PLAN OF CARE
CARDIOVASCULAR - ADULT    • Maintains optimal cardiac output and hemodynamic stability Progressing    • Absence of cardiac arrhythmias or at baseline    DENIES CHEST PAIN AT THIS TIME. ON TELE.   Progressing        METABOLIC/FLUID AND ELECTROLYTES - ADULT

## 2017-11-13 NOTE — PROGRESS NOTES
[place] [facility]    Progress Note    Ivorydaren Snowdenamilcar Patient Status:  Inpatient    1945 MRN [id number]   Location [facility] 3W/SW Attending Ernestina Hamilton, [address] [facility] Day # 2 PCP Markell Valencia DO       Subjective:   Gisela Lopez Furoate-Vilanterol (BREO ELLIPTA) 200-25 MCG/INH inhaler 1 puff 1 puff Inhalation Daily   BuPROPion HCl ER (XL) (WELLBUTRIN XL) 150 MG 24 hr tab 150 mg 150 mg Oral Daily   calcitonin (salmon) (MIACALCIN) nasal solution 1 spray 1 spray Alternating Nares Cristino Cordero pulmonary emphysema. 3.  Coronary calcifications. 4.  Nonspecific circumferential wall thickening and luminal narrowing of the distal half of the esophagus.   This could represent changes of a hiatal hernia however given the craniad extent of this finding, normal EF grade 1 diastolic dysfunction  To note admitted end of October with COPD pulmonary added inhaler  We will continue home inhalers DuoNeb's scheduled and as needed  PFT's in outpt setting   Pt reports oxyen at home but doesn't use it .  Will arrange

## 2017-11-13 NOTE — PHYSICAL THERAPY NOTE
PHYSICAL THERAPY TREATMENT NOTE - INPATIENT    Room Number: 336/336-A       Presenting Problem: acute respiratory failure with hypoxia    Problem List  Principal Problem:    Acute respiratory failure with hypoxia [facility])      ASSESSMENT   Patient is a 74 ye training;Transfer training;Balance training    SUBJECTIVE  Pt stated \"I feel ok\"    OBJECTIVE  Precautions:  Other (Comment) (HOB greater than or at 30 deg)    WEIGHT BEARING RESTRICTION  Weight Bearing Restriction: None                PAIN ASSESSMENT   R session/findings; All patient questions and concerns addressed; Alarm set; Family present    CURRENT GOALS     Goals to be met by: 11/26/17  Patient Goal Patient's self-stated goal is: \"go home\"   Goal #1 Patient is able to demonstrate supine - sit EOB @ le

## 2017-11-13 NOTE — DISCHARGE SUMMARY
[facility]    Discharge Summary    Leeanne Castaneda Patient Status:  Inpatient    1945 MRN [id number]   Location [facility] 3W/SW Attending Eric León, [address] [facility] Day # 2 PCP Ernesto Gray DO     Date of Admission: behavioral disturbance     Acute bronchospasm     Acute cystitis without hematuria     Inability to ambulate due to multiple joints     Memory loss     Acute respiratory failure with hypoxia [facility])      Reason for Admission: fall    Physical Exam:   General and thickening. Discussed with pt EGD will be done as outpt              Dedra Lesches, DO  >35 min spent with patient. > 50% time was spent counseling patient, discussing plan of care, discussing labs and imaging findings. Spoke with consultant.  All que reports oxyen at home but doesn't use it      Generalized weakness and fall  Consult physical therapy  Check procalcitonin to r/o infection.  UA is neg      Fibromyalgia chronic pain syndrome osteoporosis lumbar stenosis  Rehab consult     Anxiety disorder Refills:  0     Albuterol Sulfate  (90 Base) MCG/ACT Aers      Inhale 2 puffs into the lungs every 6 (six) hours as needed for Wheezing.    Refills:  0     Budesonide-Formoterol Fumarate 160-4.5 MCG/ACT Aero  Commonly known as:  SYMBICORT      Inhale PM    > 35 min

## 2017-11-14 NOTE — PLAN OF CARE
MUSCULOSKELETAL - ADULT    • Return mobility to safest level of function Not Progressing          CARDIOVASCULAR - ADULT    • Maintains optimal cardiac output and hemodynamic stability Progressing    • Absence of cardiac arrhythmias or at baseline Progress

## 2017-11-14 NOTE — PHYSICAL THERAPY NOTE
PHYSICAL THERAPY TREATMENT NOTE - INPATIENT    Room Number: 336/336-A       Presenting Problem: acute respiratory failure with hypoxia    Problem List  Principal Problem:    Acute respiratory failure with hypoxia (HCC)  Active Problems:    Apraxia      AS Nursing staff aware that pt needs 2 people assist for transfers. Pt continues to stay below the baseline and will continue to benefit from skilled PT while in hospital so pt can maximize functional potential and achieve higher level of function.   Recom -   Moving from lying on back to sitting on the side of the bed?: A Lot   How much help from another person does the patient currently need. ..   -   Moving to and from a bed to a chair (including a wheelchair)?: A Lot   -   Need to walk in hospital room?

## 2017-11-14 NOTE — PROGRESS NOTES
[place] [facility]    Progress Note    Naima Minor Patient Status:  Inpatient    1945 MRN [id number]   Location [facility] 3W/SW Attending Pinky Ewing, [address] [facility] Day # 3 PCP Olu Andres DO       Subjective:   Dalila Figueroa brain from 2015. I discussed MRI, but because of her kyphosis, she does not want to try to do the MRI. I would suggest 81 mg aspirin daily. Lipids will be rechecked. She will probably need additional therapy for rehab.   I reviewed the CT images with Oral Nightly   QUEtiapine Fumarate (SEROQUEL) tab 50 mg 50 mg Oral Nightly   Sertraline HCl (ZOLOFT) tab 300 mg 300 mg Oral Daily       Results:     Lab Results  Component Value Date   WBC 6.4 11/13/2017   HGB 11.8 (L) 11/13/2017   HCT 35.4 11/13/2017   PL

## 2017-11-14 NOTE — PROGRESS NOTES
[place] [facility]    Progress Note    Wong Stage Patient Status:  Inpatient    1945 MRN [id number]   Location [facility] 3W/SW Attending Andrew Arce, [address] Day # 3 PCP Jacklyn Whalen DO       Subjective:   Nael Ramirez mg 650 mg Oral Q6H PRN   Albuterol Sulfate  (90 Base) MCG/ACT inhaler 2 puff 2 puff Inhalation Q6H PRN   Fluticasone Furoate-Vilanterol (BREO ELLIPTA) 200-25 MCG/INH inhaler 1 puff 1 puff Inhalation Daily   BuPROPion HCl ER (XL) (WELLBUTRIN XL) 150 used.   FINDINGS:  PARENCHYMA:   Diffuse low attenuation is seen within the bilateral periventricular white matter compatible with chronic microvascular ischemic disease. Small asymmetric hypodensity seen within the left thalamus.  Small lacunar infarcts se ischemic disease.        Ekg 12-lead    Result Date: 11/14/2017  ECG Report  Interpretation  --------------------------       Results:     CBC:      Lab Results  Component Value Date   WBC 6.4 11/13/2017   WBC 6.4 11/12/2017   WBC 13.3 (H) 11/11/2017 daily   Suspect falls are 2/2 over-medication with ativan. Have changed ativan to q 4 prn and have consulted Dr Jaren Espinosa.  Dr Tal Pendleton to see mario.      Fibromyalgia chronic pain syndrome osteoporosis lumbar stenosis  Rehab consult     Anxiety disorder

## 2017-11-14 NOTE — CM/SW NOTE
11/14CM-The Patient was seen at bedside in regards going to rehab. The Patient requested this Writer to speak to her  about rehab. This Writer spoke with the Patient's  Vinodlolita Hollins ([phone]) in regards to going to rehab.  The Patient's

## 2017-11-14 NOTE — OCCUPATIONAL THERAPY NOTE
OCCUPATIONAL THERAPY EVALUATION - INPATIENT     Room Number: 336/336-A  Evaluation Date: 11/14/2017  Type of Evaluation: Initial  Presenting Problem:  (acute respiratory failure; fall )    Physician Order: IP Consult to Occupational Therapy  Reason for The These deficits manifest functionally while performing ADLs and other functional tasks.  The patient is below baseline and would benefit from skilled inpatient OT to address the above deficits, maximizing patient's ability to return to prior level of funct combo; Tub seat  Other Equipment: None       Hand Dominance: Right  Drives: No  Patient Regularly Uses:  Other (Comment) (home O2 as needed)    Stairs in Home: Patient able to live on main level  Use of Assistive Device(s): RW    Prior Level of [place]: within functional limits- grossly limited 3+/5     COORDINATION  Gross Motor: WFL   Fine Motor: WFL   Patient unable to complete rapid alternating movements w/ BUEs with verbal cues and increased time.  (diadochokinesia)    ACTIVITIES OF DAILY LIVING ASSESS Goals    Patients self stated goal is: go home to      Patient will complete functional transfer with mod A.   Comment:     Patient will complete toileting with mod A  Comment:     Patient will tolerate standing for 2 minutes in prep for adls with mo

## 2017-11-15 NOTE — SLP NOTE
ADULT SWALLOWING EVALUATION    ASSESSMENT    ASSESSMENT/OVERALL IMPRESSION:  Pt seen for clinical swallow evaluation secondary to new onset of right sided weakness and inability to walk with several falls at home.   The pt was seen at bedside and re-positio straws; Slow rate; Alternate consistencies;Small bites and sips;Multiple swallows  Aspiration Precautions: Upright position;Small bites and sips; Slow rate; No straw  Medication Administration Recommendations: Whole in puree  Treatment Plan/Recommendations: Dy expression, and auditory comprehension functional.    OBJECTIVE   ORAL MOTOR EXAMINATION  Dentition: Natural  Symmetry: Within Functional Limits  Strength:  Within Functional Limits  Tone: Within Functional Limits  Range of Motion: Within Functional Limits No straws, Upright 90 degrees with mild assistance 100 % of the time across 2 sessions.     In Progress     FOLLOW UP  Treatment Plan/Recommendations: Dysphagia therapy  Number of Visits to Meet Established Goals: 3  Follow Up Needed: Yes  SLP Follow-up Phi

## 2017-11-15 NOTE — CM/SW NOTE
11/[address] has insurance approval and is able to accept  the Patient  today (11/15) for transfer at 5:30p.m. This Writer informed the Patient's RN of the above.   The Patient requires an ambulance transfer, she is on 2 liters of oxygen,

## 2017-11-15 NOTE — PLAN OF CARE
SLP EVAL THIS AM, PT. PLACED ON NECTAR THICK LIQUIDS, D/C TO [facility] THIS EVENING

## 2017-11-15 NOTE — CONSULTS
[place] [facility]    Report of Consultation    Haroon Romero Patient Status:  Inpatient    1945 MRN [id number]   Location [facility] 3W/SW Attending Derick Robbins, [address] [facility] Day # 3 PCP Dav Ragland,      Date of Admiss denied any homicidal or suicidal ideation. Patient deny any current anxiety and presented very pleasant.       Medical History:       Past Medical History  Past Medical History:   Diagnosis Date   • Ankle fracture, left 1985    ORIF   • Anxiety state    • Intravenous PRN   PEG 3350 (MIRALAX) powder packet 17 g 17 g Oral Daily PRN   magnesium hydroxide (MILK OF MAGNESIA) 400 MG/5ML suspension 30 mL 30 mL Oral Daily PRN   bisacodyl (DULCOLAX) rectal suppository 10 mg 10 mg Rectal Daily PRN   FLEET ENEMA (FLEE Soln Inhale 2 puffs into the lungs every 6 (six) hours as needed for Wheezing. DICYCLOMINE HCL 10 MG Oral Cap TAKE 1 CAPSULE TWICE DAILY   NEXIUM 24HR 20 MG Oral Tab EC Take 20 mg by mouth 2 (two) times daily.    Spacer/Aero-Holding Chambers (AEROCHAMBER mid and lower lung. 2. COPD/emphysema. 3. Tortuosity and atherosclerosis aorta. 4. Multiple vertebral body compression fractures and post kyphoplasty changes. 5. Old left rib fractures and right proximal humerus fracture.          Ct Brain Or Head (45334) become available for comparison following the dictation. The previously described age-indeterminate lacunar infarct within the left thalamus was not seen on MRI. Lacunar infarcts within the brainstem appear unchanged.   The moderate generalized parenchyma acknowledging the current symptom and severity. At this point, I would recommend the following approach:      1. Focus on education and support. 2.  Patient is appropriate to be transferred to inpatient skilled nursing facility if indicated.   3.  Cont

## 2017-11-15 NOTE — DISCHARGE SUMMARY
More than 30 min spent on dc  Dc summary # [id number]  Hospital Discharge Diagnoses: cva    TCM Diagnosis at discharge from Hospital: see above    Please note that only patients enrolled in the Medicare ACO, [facility] ACO and [address] [place] [address] will be handled by a member

## 2017-11-15 NOTE — PROGRESS NOTES
[place] [facility]    Progress Note    Flori Small Patient Status:  Inpatient    1945 MRN [id number]   Location [facility] 3W/SW Attending Leydi Velazquez Day # 4 PCP Margie Fajardo DO       Subjective:   Talia Facility-Administered Medications:  aspirin chewable tab 81 mg 81 mg Oral Daily   acetaminophen (TYLENOL) tab 650 mg 650 mg Oral Q4H PRN   Or      acetaminophen (TYLENOL) 650 MG rectal suppository 650 mg 650 mg Rectal Q4H PRN   Heparin Sodium (Porcine) 500 Oral Daily       Results:     Lab Results  Component Value Date   WBC 7.5 11/15/2017   HGB 13.2 11/15/2017   HCT 39.2 11/15/2017    11/15/2017   CREATSERUM 0.79 11/15/2017   BUN 17 11/15/2017    11/15/2017   K 3.9 11/15/2017   CL 98 11/15/2017 Age-indeterminate lacunar infarct within the left thalamus. Remote infarcts within the brainstem. No acute hemorrhage. Moderate chronic microvascular ischemic disease.    Dictated by (CST): Chilo Valadez MD on 11/14/2017 at 7:38     Approved by (CST): Stefano

## 2017-11-16 NOTE — PROGRESS NOTES
Lidya Dallas  : 1945  Age 74year old  female patient is admitted to [place] [address] for strengthening and rehab on 11/15/2017    Well known to [facility] sub acute rehab      Chief complaint: CVA/ resp failure/anxiety disorder/kypohisis     HPI to try and keep head up in midline position while eating. Will have admission labs drawn at facility tomorrow and will have her PT/OT evaluations done today.        Past Medical History:   Diagnosis Date   • Ankle fracture, left 1985    ORIF   • Anxiety sta for Anxiety. Disp: 20 tablet Rfl: 0   QUEtiapine Fumarate 25 MG Oral Tab Take 1 tablet (25 mg total) by mouth nightly. Disp: 30 tablet Rfl: 0   bisacodyl 10 MG Rectal Suppos Place 1 suppository (10 mg total) rectally daily as needed.  Disp: 20 suppository R throat;  RESPIRATORY: denies shortness of breath, wheezing or cough   CARDIOVASCULAR:denies chest pain, no palpitations   GI: denies nausea, vomiting, constipation, diarrhea; no rectal bleeding; no heartburn  :no dysuria, urgency or frequency; no vaginal EF grade 1 diastolic dysfunction    2. Generalized weakness and fall  -PT/OT in rehab  -Continue MVI  -Age indeterminate lacunar infarct within the left thalamus on CT   -Fall precautions in rehab   -VS Q shift     3.  Fibromyalgia chronic pain syndrome oste

## 2017-11-19 NOTE — DISCHARGE SUMMARY
[facility]    PATIENT'S NAME: Sruthi Anthony   ATTENDING PHYSICIAN: Carmine Velazquez MD   PATIENT ACCOUNT#:   [id number]    LOCATION:  [address] [place] [address] #:   [id number]       YOB: 1945  ADMISSION DATE:       11/11 significant calf tenderness. NEUROLOGIC:  She is alert, at least.  Friendly and cooperative. LABORATORY STUDIES:  Please see chart. ASSESSMENT AND PLAN:    1.    Acute hypoxic respiratory failure on chronic respiratory failure from chronic obstruct mouth after use. 7.   Wellbutrin  mg daily. 8.   Sertraline 300 mg daily. 9.   Quetiapine 125 mg nightly. Watch for drowsiness. 10.   Dulcolax 10 mg daily. 11.   Calcitonin 1 spray each nostril daily. 12.   Donepezil 10 mg daily.   Kaity Cohn

## 2017-11-21 NOTE — PROGRESS NOTES
Brant Jean Baptiste  : 1945  Age 74year old  female patient is admitted to [place] [address] for  strengthening and rehab on 11/15/2017     Well known to [facility] sub acute rehab        Chief complaint: CVA/ resp failure/anxiety disorder/kypohisis     asked TCN RN at rehab if her Ativan can be scheduled Q 4 hours, informed of Dr Marquez Andrade recommendations due to possible over medicated to leave Ativan 0.5 mg PO Q 4 only PRN. Currently, she is calm and cooperative. Having family care conference today.  Dr Jagdeep Martinez Butalbital-APAP-Caffeine (FIORICET) -40 MG Oral Tab Take 1 tablet by mouth every 4 (four) hours as needed.  Disp: 20 tablet Rfl: 0   Albuterol Sulfate  (90 Base) MCG/ACT Inhalation Aero Soln Inhale 2 puffs into the lungs every 6 (six) hours a excessive thirst or urination; denies unexpected wt gain or wt loss  ALLERGY/IMM.: denies food or seasonal allergies        PHYSICAL EXAM:  GENERAL HEALTH: thin fragile female  LINES, TUBES, DRAINS:  none  SKIN: no rashes, no suspicious lesions  WOUND: NON Ativan Q4 PRN as per Dr Kayla Lundy recommendations in hospital      5. GERD  -Continue Nexium    -ct with nonspecific luminal wall narrowing and thickening. Discussed with pt EGD and she will let me know. Can be done as outpt     6.  History of IBS  -continue

## 2017-11-22 NOTE — PROGRESS NOTES
Dong Mojica  : 1945  Age 74year old  female patient is admitted to [place] [address] for  strengthening and rehab on 11/15/2017     Admitted to [facility] AND CLINICS on: 17 to 11/15/17    Chief complaint: CVA/ resp failure/anxiety disorder/kypo fair.  asked TCN RN at rehab if her Ativan can be scheduled Q 4 hours, informed of Dr Danette Rudolph recommendations due to possible over medicated to leave Ativan 0.5 mg PO Q 4 only PRN but they saw Dr. Luis Gaines her regular physician and he changed ativ 3 Bottle Rfl: 3   Budesonide-Formoterol Fumarate 160-4.5 MCG/ACT Inhalation Aerosol Inhale 2 puffs into the lungs 2 (two) times daily. Disp:  Rfl:    acetaminophen 325 MG Oral Tab Take 2 tablets (650 mg total) by mouth every 6 (six) hours as needed.  Disp: frequency; no vaginal discharge; no urinary incontinence; no hematuria  MUSCULOSKELETAL:no joint complaints upper or lower extremities/overall weakness feeling to her legs  NEURO:no sensory or motor complaint  PSYCHE: +anxiety  HEMATOLOGY:denies excessive dysfunction     2.Generalized weakness and fall  -PT/OT in rehab  -Continue MVI  -Age indeterminate lacunar infarct within the left thalamus on CT   -Fall precautions in rehab   -VS Q shift      3.  Fibromyalgia chronic pain syndrome osteoporosis lumbar marquise

## 2017-11-27 NOTE — PROGRESS NOTES
Rita Wade, 9/11/1945, 74year old, female at [facility] and rehab on 11/15/2017     Chief Complaint: Hypokalemia,JAVI, resp failure, follow up on fall    Admitted to [facility] AND CLINICS on: 11/11/17 to 11/15/17    HPI  74year old fem quadrants, had normal BM yesterday. No pedal edema noted.  Follow up labs were done today, potassium level of 2.9 noted, pt does report eating to her normal. Will give 40 meq PO KDUR now and again at bedtime, start 20 meq po daily tomorrow and repeat BMP to masses; no bruits; nontender, no guarding, no rebound tenderness.   :no suprapubic distension  LYMPHATIC:no lymphedema  MUSCULOSKELETAL: +BUE/BLE strength intact: though  reports right sided weakness with standing and ambulation   EXTREMITIES/VASCU with pt EGD and she will let me know. Can be done as outpt     6. History of IBS  -continue dicyclomine twice daily  -Bisacodyl as needed for constipation     7. Glaucoma  -Continue latanoprost eyedrop  8.  Discharge  -Given this is patients 3rd rehab admis

## 2017-11-30 NOTE — PROGRESS NOTES
Clemencia Armenta, 9/11/1945, 74year old, female at [facility] and rehab on 11/15/2017      Chief Complaint: Hypokalemia,JAVI, resp failure, follow up on fall     Admitted to [facility] AND CLINICS on: 11/11/17 to 11/15/17     HPI  74year old  hypokalemia on Monday of 2.9, received PO potassium, follow up BMP yesterday demonstrated a rise in her Potassium level to 4.2.      Objective:  /60   Pulse 81   Temp (!) 97.2 °F (36.2 °C)   Resp 18   Wt 99 lb 6.4 oz (45.1 kg)   BMI 19.41 kg/m²   GENE  reports right sided weakness with standing and ambulation   EXTREMITIES/VASCULAR:no cyanosis, clubbing or edema  NEUROLOGIC: follows commands  PSYCHIATRIC: Anxious: easily redirectable      Medications reviewed: Yes    BMP 11/28:  CR 0.46   BUN 19 counseling the patient and/or coordinating care.     HOLLEY Tripp  11/30/2017  1:37 PM

## 2017-12-04 NOTE — PROGRESS NOTES
Miguel Angel Soto  : 1945  Age 74year old  female patient is admitted to [address] [place] [address] and rehab on 11/15/2017     Admitted to [facility] on: 17 to 11/15/17    Chief complaint:Hypokalemia,JAVI, resp failure, follow up on f Will need another care plan before discharge.  Patient will be at very high risk of readmission, numerous admissions this year.         ALLERGIES:    Clindamycin             Rash, Shortness of Breath    Comment:Per pt received this medicine and bactrim in E (AEROCHAMBER PLUS CHETAN-VU) Does not apply Misc  Disp:  Rfl:    BuPROPion HCl ER, SR, 150 MG Oral Tablet 12 Hr daily. Disp:  Rfl:    Donepezil HCl 10 MG Oral Tablet Dispersible nightly. Disp:  Rfl:    Memantine HCl 10 MG Oral Tab 2 (two) times daily.    D pink, moist, pharynx no exudate, no visible cerumen.   NECK: supple; FROM; no JVD, no TMG, no carotid bruits  RESPIRATORY:Diminsihed overall: see above  CARDIOVASCULAR: no murmur  ABDOMEN:  normal active BS+, soft, nondistended; no organomegaly, no masses; again .  Pt anxious when this was brought up and  doesn't want to hear it.  -Family Care plan held 94/95/2758, uncertain of dc plan-  wants to take home, LTC recommended, discharge 12/13/17  With  for home ?  -Wishes to remain full code

## 2017-12-07 NOTE — PROGRESS NOTES
Edi Vee, 9/11/1945, 74year old, female at [facility] and rehab on 11/15/2017      Admitted to [facility] AND CLINICS on: 11/11/17 to 11/15/17     Chief complaint:Hypokalemia,JAVI, resp failure,weakness         HPI  74year old female redirected. Potential discharge for 12/13, recommendations for AL but pt's  does not want that and requests to take her home to care for her.      Objective:  /60   Pulse 81   Temp (!) 97.2 °F (36.2 °C)   Resp 18   Wt 102 lb (46.3 kg)   SpO2 92 +BUE/BLE strength intact: though  reports right sided weakness with standing and ambulation   EXTREMITIES/VASCULAR:no cyanosis, clubbing or edema  NEUROLOGIC: follows commands  PSYCHIATRIC: Anxious: easily redirectable     Last BMP 11/28:  CR 0.46   minute visit and greater than 50% of the time was spent counseling the patient and/or coordinating care.     HOLLEY Elizalde  12/7/2017  10:08 AM

## 2017-12-07 NOTE — PROGRESS NOTES
Jorge Luis Orozcojenni, 9/11/1945, 74year old, female at [facility] and rehab on 11/15/2017      Admitted to [facility] AND CLINICS on: 11/11/17 to 11/15/17     Chief complaint:Hypokalemia,JAVI, resp failure,weakness/wheezing      STARING PREDNISONE crying and anxiety, but is easily calmed down and redirected. Potential discharge for 12/13, recommendations for AL but pt's  does not want that and requests to take her home to care for her.  Will start pt on Prednisone taper: starting with 40 mg QD organomegaly, no masses; no bruits; nontender, no guarding, no rebound tenderness.   :no suprapubic distension  LYMPHATIC:no lymphedema  MUSCULOSKELETAL: +BUE/BLE strength intact: though  reports right sided weakness with standing and ambulation dc plan-  wants to take home, LTC recommended, discharge 12/13/17  With  for home ?  -Wishes to remain full code at this time  -will discuss needs with SW and equipment needed for home if that is where pt goes to     This is a 15 minute visit

## 2017-12-11 NOTE — PROGRESS NOTES
Estela Fajardo  : 1945  Age 74year old  female patient is admitted to [address] [place] [address] and rehab on 11/15/2017     Admitted to [facility] on: 17 to 11/15/17    Chief complaint: Hypokalemia,JAVI, resp failure,weakness/wheez care for her. Cont  on Prednisone taper. Reviewed BMP 12/11/17: creat 0.54, BUN 23, Glucose 128, Ca 9.0, Na 138, K 4.6, CHl 102, CO2 27, eGFR >60.      ALLERGIES:    Clindamycin             Rash, Shortness of Breath    Comment:Per pt received this medicin times daily. Disp:  Rfl:    Spacer/Aero-Holding Chambers (AEROCHAMBER PLUS CHETAN-VU) Does not apply Misc  Disp:  Rfl:    BuPROPion HCl ER, SR, 150 MG Oral Tablet 12 Hr daily. Disp:  Rfl:    Donepezil HCl 10 MG Oral Tablet Dispersible nightly.    Disp:  Rfl: normocephalic; normal nose, no nasal drainage, mucous membranes pink, moist, pharynx no exudate, no visible cerumen.   NECK: supple; FROM; no JVD, no TMG, no carotid bruits  RESPIRATORY:Expiratory wheezing throughout   CARDIOVASCULAR: no murmur  ABDOMEN:  n 12/6  7. Glaucoma  -Continue latanoprost eyedrop  8. Discharge  -Given this is patients multiple rehab admission: need to discuss LTC/AL options with patient and  again .  Pt anxious when this was brought up and  doesn't want to hear it.  -Fam

## 2017-12-12 NOTE — PROGRESS NOTES
Shukri Aragon  : 1945  Age 74year old  female patient is admitted to [address] [place] [address] and rehab on 11/15/2017     Admitted to [facility] on: 17 to 11/15/17    Chief complaint:low grade fever, achiness    HPI  74year old increased to  92% to 94%. Cont Duonebs. Abdomen soft non tender with +BS,  BM today, no edema. Discharge for 12/13, recommendations for AL but pt's  does not want that and requests to take her home to care for her. Will complete Prednisone taper. total) by mouth every 6 (six) hours as needed. Disp: 30 tablet Rfl: 0   Butalbital-APAP-Caffeine (FIORICET) -40 MG Oral Tab Take 1 tablet by mouth every 4 (four) hours as needed.  Disp: 20 tablet Rfl: 0   Albuterol Sulfate  (90 Base) MCG/ACT In her legs  NEURO:no sensory or motor complaint  PSYCHE: +anxiety  HEMATOLOGY:denies excessive bleeding  ENDOCRINE: denies excessive thirst or urination; denies unexpected wt gain or wt loss  ALLERGY/IMM.: denies food or seasonal allergies     PHYSICAL EXAM: Casper's recommendations in hospital but Dr. Tim Late who she saw today , changed ativan 0.5 mg to q 4  scheduled   5. GERD  -Continue Nexium    -ct with nonspecific luminal wall narrowing and thickening. Discussed with pt EGD and she will let me know.  Ca

## 2017-12-14 NOTE — PROGRESS NOTES
TCM OUTREACH    Call made to attempt to reach patient for TCM follow up. Pt unavailable, family member requested call back.      (Triage Team if pt returns call please transfer to ext [phone])

## 2017-12-19 PROBLEM — M48.00 SPINAL STENOSIS: Status: ACTIVE | Noted: 2017-01-01

## 2017-12-19 NOTE — PROGRESS NOTES
HPI:    Clemencia Armenta is a 74year old female here today for hospital follow up.    She was discharged from Long-term care facilities, [facility] to Home   Admission Date: 11/11/17   Discharge Date: 11/15/17  Hospital Discharge Diagnosis: copd  TCM Nasal Solution 1 spray by Nasal route daily. Budesonide-Formoterol Fumarate 160-4.5 MCG/ACT Inhalation Aerosol Inhale 2 puffs into the lungs 2 (two) times daily.    acetaminophen 325 MG Oral Tab Take 2 tablets (650 mg total) by mouth every 6 (six) hours a pack-year smoking history. She has never used smokeless tobacco. She reports that she does not drink alcohol or use drugs. ROS:   Review of Systems   Constitutional: Positive for activity change, appetite change and fatigue. Respiratory: Negative. 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 Other orders  -     Discontinue: hydrocodone-acetaminophen 7.5-325 MG Oral Tab;  Take 1 tablet by mout

## 2017-12-28 NOTE — TELEPHONE ENCOUNTER
Pt's spouse called in to follow up - can this be sent to ON since pt's PCP is out of the office until next week? Pt's spouse is upset.

## 2017-12-28 NOTE — TELEPHONE ENCOUNTER
[facility] requesting new home health orders for new start of care for  nursing & PT services  Said pt's  previous certification ended 66/39  Having problems with ins authorizing based on medical necessity- ins thinks pt has plateaued  Alondra Tirado pt's  is upset

## 2018-01-01 ENCOUNTER — SNF VISIT (OUTPATIENT)
Dept: INTERNAL MEDICINE CLINIC | Facility: SKILLED NURSING FACILITY | Age: 73
End: 2018-01-01

## 2018-01-01 ENCOUNTER — TELEPHONE (OUTPATIENT)
Dept: FAMILY MEDICINE CLINIC | Facility: CLINIC | Age: 73
End: 2018-01-01

## 2018-01-01 ENCOUNTER — TELEPHONE (OUTPATIENT)
Dept: OTHER | Age: 73
End: 2018-01-01

## 2018-01-01 ENCOUNTER — APPOINTMENT (OUTPATIENT)
Dept: GENERAL RADIOLOGY | Facility: HOSPITAL | Age: 73
DRG: 871 | End: 2018-01-01
Attending: INTERNAL MEDICINE
Payer: MEDICARE

## 2018-01-01 ENCOUNTER — PATIENT MESSAGE (OUTPATIENT)
Dept: FAMILY MEDICINE CLINIC | Facility: CLINIC | Age: 73
End: 2018-01-01

## 2018-01-01 ENCOUNTER — HOSPITAL ENCOUNTER (INPATIENT)
Facility: HOSPITAL | Age: 73
LOS: 3 days | Discharge: NURSING, REHAB OR LONG-TERM CARE | DRG: 177 | End: 2018-01-01
Attending: EMERGENCY MEDICINE | Admitting: HOSPITALIST
Payer: MEDICARE

## 2018-01-01 ENCOUNTER — HOSPITAL ENCOUNTER (OUTPATIENT)
Dept: GENERAL RADIOLOGY | Facility: HOSPITAL | Age: 73
Discharge: HOME | End: 2018-01-01
Attending: NURSE PRACTITIONER
Payer: MEDICARE

## 2018-01-01 ENCOUNTER — APPOINTMENT (OUTPATIENT)
Dept: GENERAL RADIOLOGY | Facility: HOSPITAL | Age: 73
DRG: 177 | End: 2018-01-01
Attending: HOSPITALIST
Payer: MEDICARE

## 2018-01-01 ENCOUNTER — APPOINTMENT (OUTPATIENT)
Dept: GENERAL RADIOLOGY | Facility: HOSPITAL | Age: 73
DRG: 177 | End: 2018-01-01
Attending: INTERNAL MEDICINE
Payer: MEDICARE

## 2018-01-01 ENCOUNTER — APPOINTMENT (OUTPATIENT)
Dept: GENERAL RADIOLOGY | Facility: HOSPITAL | Age: 73
DRG: 871 | End: 2018-01-01
Attending: HOSPITALIST
Payer: MEDICARE

## 2018-01-01 ENCOUNTER — OFFICE VISIT (OUTPATIENT)
Dept: FAMILY MEDICINE CLINIC | Facility: CLINIC | Age: 73
End: 2018-01-01

## 2018-01-01 ENCOUNTER — TELEPHONE (OUTPATIENT)
Dept: INTERNAL MEDICINE CLINIC | Facility: CLINIC | Age: 73
End: 2018-01-01

## 2018-01-01 ENCOUNTER — PATIENT OUTREACH (OUTPATIENT)
Dept: CASE MANAGEMENT | Age: 73
End: 2018-01-01

## 2018-01-01 ENCOUNTER — LAB ENCOUNTER (OUTPATIENT)
Dept: LAB | Facility: HOSPITAL | Age: 73
End: 2018-01-01
Attending: NURSE PRACTITIONER
Payer: MEDICARE

## 2018-01-01 ENCOUNTER — NURSE ONLY (OUTPATIENT)
Dept: WOUND CARE | Facility: HOSPITAL | Age: 73
End: 2018-01-01
Attending: NURSE PRACTITIONER
Payer: MEDICARE

## 2018-01-01 ENCOUNTER — APPOINTMENT (OUTPATIENT)
Dept: CT IMAGING | Facility: HOSPITAL | Age: 73
DRG: 871 | End: 2018-01-01
Attending: HOSPITALIST
Payer: MEDICARE

## 2018-01-01 ENCOUNTER — APPOINTMENT (OUTPATIENT)
Dept: ULTRASOUND IMAGING | Facility: HOSPITAL | Age: 73
DRG: 871 | End: 2018-01-01
Attending: HOSPITALIST
Payer: MEDICARE

## 2018-01-01 ENCOUNTER — APPOINTMENT (OUTPATIENT)
Dept: WOUND CARE | Facility: HOSPITAL | Age: 73
End: 2018-01-01
Payer: MEDICARE

## 2018-01-01 ENCOUNTER — APPOINTMENT (OUTPATIENT)
Dept: GENERAL RADIOLOGY | Facility: HOSPITAL | Age: 73
End: 2018-01-01
Attending: EMERGENCY MEDICINE
Payer: MEDICARE

## 2018-01-01 ENCOUNTER — APPOINTMENT (OUTPATIENT)
Dept: CT IMAGING | Facility: HOSPITAL | Age: 73
DRG: 871 | End: 2018-01-01
Attending: PHYSICIAN ASSISTANT
Payer: MEDICARE

## 2018-01-01 ENCOUNTER — APPOINTMENT (OUTPATIENT)
Dept: NUCLEAR MEDICINE | Facility: HOSPITAL | Age: 73
DRG: 871 | End: 2018-01-01
Attending: HOSPITALIST
Payer: MEDICARE

## 2018-01-01 ENCOUNTER — APPOINTMENT (OUTPATIENT)
Dept: GENERAL RADIOLOGY | Facility: HOSPITAL | Age: 73
DRG: 177 | End: 2018-01-01
Attending: EMERGENCY MEDICINE
Payer: MEDICARE

## 2018-01-01 ENCOUNTER — HOSPITAL ENCOUNTER (INPATIENT)
Facility: HOSPITAL | Age: 73
LOS: 30 days | Discharge: HOSPICE | DRG: 871 | End: 2018-01-01
Attending: EMERGENCY MEDICINE | Admitting: HOSPITALIST
Payer: MEDICARE

## 2018-01-01 ENCOUNTER — HOSPITAL ENCOUNTER (OUTPATIENT)
Facility: HOSPITAL | Age: 73
Setting detail: OBSERVATION
Discharge: HOME | End: 2018-01-01
Attending: EMERGENCY MEDICINE | Admitting: HOSPITALIST
Payer: MEDICARE

## 2018-01-01 ENCOUNTER — TELEPHONE (OUTPATIENT)
Dept: INTERNAL MEDICINE UNIT | Facility: HOSPITAL | Age: 73
End: 2018-01-01

## 2018-01-01 ENCOUNTER — APPOINTMENT (OUTPATIENT)
Dept: GENERAL RADIOLOGY | Facility: HOSPITAL | Age: 73
DRG: 871 | End: 2018-01-01
Attending: EMERGENCY MEDICINE
Payer: MEDICARE

## 2018-01-01 VITALS
WEIGHT: 97.63 LBS | SYSTOLIC BLOOD PRESSURE: 116 MMHG | BODY MASS INDEX: 17 KG/M2 | OXYGEN SATURATION: 97 % | HEART RATE: 97 BPM | TEMPERATURE: 98 F | DIASTOLIC BLOOD PRESSURE: 68 MMHG | RESPIRATION RATE: 20 BRPM

## 2018-01-01 VITALS
HEART RATE: 85 BPM | WEIGHT: 99 LBS | BODY MASS INDEX: 18 KG/M2 | SYSTOLIC BLOOD PRESSURE: 144 MMHG | DIASTOLIC BLOOD PRESSURE: 77 MMHG

## 2018-01-01 VITALS
RESPIRATION RATE: 18 BRPM | HEIGHT: 63 IN | TEMPERATURE: 97 F | HEART RATE: 94 BPM | OXYGEN SATURATION: 95 % | BODY MASS INDEX: 17.66 KG/M2 | SYSTOLIC BLOOD PRESSURE: 118 MMHG | WEIGHT: 99.69 LBS | DIASTOLIC BLOOD PRESSURE: 64 MMHG

## 2018-01-01 VITALS
SYSTOLIC BLOOD PRESSURE: 120 MMHG | RESPIRATION RATE: 20 BRPM | OXYGEN SATURATION: 96 % | BODY MASS INDEX: 20 KG/M2 | TEMPERATURE: 98 F | DIASTOLIC BLOOD PRESSURE: 58 MMHG | WEIGHT: 113.63 LBS | HEART RATE: 88 BPM

## 2018-01-01 VITALS
DIASTOLIC BLOOD PRESSURE: 63 MMHG | RESPIRATION RATE: 18 BRPM | TEMPERATURE: 99 F | WEIGHT: 99 LBS | OXYGEN SATURATION: 96 % | BODY MASS INDEX: 17.54 KG/M2 | SYSTOLIC BLOOD PRESSURE: 134 MMHG | HEART RATE: 86 BPM | HEIGHT: 63 IN

## 2018-01-01 VITALS
SYSTOLIC BLOOD PRESSURE: 122 MMHG | RESPIRATION RATE: 18 BRPM | BODY MASS INDEX: 17 KG/M2 | WEIGHT: 97.63 LBS | TEMPERATURE: 97 F | DIASTOLIC BLOOD PRESSURE: 59 MMHG | HEART RATE: 88 BPM

## 2018-01-01 VITALS
HEART RATE: 85 BPM | TEMPERATURE: 98 F | OXYGEN SATURATION: 91 % | SYSTOLIC BLOOD PRESSURE: 149 MMHG | DIASTOLIC BLOOD PRESSURE: 80 MMHG

## 2018-01-01 DIAGNOSIS — M81.8 OTHER OSTEOPOROSIS, UNSPECIFIED PATHOLOGICAL FRACTURE PRESENCE: ICD-10-CM

## 2018-01-01 DIAGNOSIS — J43.9 PULMONARY EMPHYSEMA, UNSPECIFIED EMPHYSEMA TYPE (HCC): ICD-10-CM

## 2018-01-01 DIAGNOSIS — M48.00 SPINAL STENOSIS, UNSPECIFIED SPINAL REGION: ICD-10-CM

## 2018-01-01 DIAGNOSIS — Z98.890 H/O KYPHOPLASTY: ICD-10-CM

## 2018-01-01 DIAGNOSIS — M54.9 CHRONIC BACK PAIN GREATER THAN 3 MONTHS DURATION: Primary | ICD-10-CM

## 2018-01-01 DIAGNOSIS — M48.03 SPINAL STENOSIS OF CERVICOTHORACIC REGION: Primary | ICD-10-CM

## 2018-01-01 DIAGNOSIS — M81.0 KYPHOSIS DUE TO OSTEOPOROSIS: ICD-10-CM

## 2018-01-01 DIAGNOSIS — F32.A DEPRESSION, UNSPECIFIED DEPRESSION TYPE: ICD-10-CM

## 2018-01-01 DIAGNOSIS — F01.50 MIXED VASCULAR AND NEURODEGENERATIVE DEMENTIA WITHOUT BEHAVIORAL DISTURBANCE (HCC): ICD-10-CM

## 2018-01-01 DIAGNOSIS — G89.4 CHRONIC PAIN SYNDROME: Primary | ICD-10-CM

## 2018-01-01 DIAGNOSIS — L89.613 DECUBITUS ULCER OF RIGHT HEEL, STAGE 3 (HCC): Primary | ICD-10-CM

## 2018-01-01 DIAGNOSIS — I63.312 CEREBROVASCULAR ACCIDENT (CVA) DUE TO THROMBOSIS OF LEFT MIDDLE CEREBRAL ARTERY (HCC): ICD-10-CM

## 2018-01-01 DIAGNOSIS — M40.00 KYPHOSIS (ACQUIRED) (POSTURAL): ICD-10-CM

## 2018-01-01 DIAGNOSIS — J45.20 MILD INTERMITTENT ASTHMA WITHOUT COMPLICATION: ICD-10-CM

## 2018-01-01 DIAGNOSIS — R53.1 WEAKNESS GENERALIZED: ICD-10-CM

## 2018-01-01 DIAGNOSIS — L89.611 DECUBITUS ULCER OF RIGHT HEEL, STAGE 1: ICD-10-CM

## 2018-01-01 DIAGNOSIS — L89.611 DECUBITUS ULCER OF RIGHT HEEL, STAGE 1: Primary | ICD-10-CM

## 2018-01-01 DIAGNOSIS — E44.1 PROTEIN-CALORIE MALNUTRITION, MILD (HCC): ICD-10-CM

## 2018-01-01 DIAGNOSIS — E86.0 DEHYDRATION: ICD-10-CM

## 2018-01-01 DIAGNOSIS — M79.7 FIBROMYALGIA: ICD-10-CM

## 2018-01-01 DIAGNOSIS — I70.0 ATHEROSCLEROSIS OF AORTA (HCC): ICD-10-CM

## 2018-01-01 DIAGNOSIS — E83.42 HYPOMAGNESEMIA: ICD-10-CM

## 2018-01-01 DIAGNOSIS — G89.29 CHRONIC BACK PAIN GREATER THAN 3 MONTHS DURATION: Primary | ICD-10-CM

## 2018-01-01 DIAGNOSIS — R53.1 WEAKNESS GENERALIZED: Primary | ICD-10-CM

## 2018-01-01 DIAGNOSIS — Z00.00 ENCOUNTER FOR ANNUAL HEALTH EXAMINATION: ICD-10-CM

## 2018-01-01 DIAGNOSIS — J96.01 ACUTE RESPIRATORY FAILURE WITH HYPOXIA (HCC): ICD-10-CM

## 2018-01-01 DIAGNOSIS — R53.1 GENERAL WEAKNESS: ICD-10-CM

## 2018-01-01 DIAGNOSIS — F17.201 TOBACCO DEPENDENCE IN REMISSION: ICD-10-CM

## 2018-01-01 DIAGNOSIS — F41.9 ANXIETY DISORDER, UNSPECIFIED TYPE: ICD-10-CM
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
## 2018-01-01 DIAGNOSIS — R41.3 MEMORY LOSS: ICD-10-CM

## 2018-01-01 DIAGNOSIS — R53.1 GENERALIZED WEAKNESS: Primary | ICD-10-CM

## 2018-01-01 DIAGNOSIS — F33.41 RECURRENT MAJOR DEPRESSIVE DISORDER, IN PARTIAL REMISSION (HCC): ICD-10-CM

## 2018-01-01 DIAGNOSIS — L89.613 DECUBITUS ULCER OF RIGHT HEEL, STAGE 3 (HCC): ICD-10-CM

## 2018-01-01 DIAGNOSIS — F41.1 GENERALIZED ANXIETY DISORDER: ICD-10-CM

## 2018-01-01 DIAGNOSIS — L97.421 HEEL ULCER, LEFT, LIMITED TO BREAKDOWN OF SKIN (HCC): ICD-10-CM

## 2018-01-01 DIAGNOSIS — J18.9 COMMUNITY ACQUIRED PNEUMONIA OF LEFT LUNG, UNSPECIFIED PART OF LUNG: Primary | ICD-10-CM

## 2018-01-01 DIAGNOSIS — R41.3 SHORT-TERM MEMORY LOSS: ICD-10-CM

## 2018-01-01 DIAGNOSIS — M81.0 AGE-RELATED OSTEOPOROSIS WITHOUT CURRENT PATHOLOGICAL FRACTURE: ICD-10-CM

## 2018-01-01 DIAGNOSIS — M40.10 KYPHOSIS DUE TO OSTEOPOROSIS: ICD-10-CM

## 2018-01-01 DIAGNOSIS — Z99.81 DEPENDENCE ON SUPPLEMENTAL OXYGEN: ICD-10-CM

## 2018-01-01 DIAGNOSIS — J18.9 COMMUNITY ACQUIRED PNEUMONIA OF LEFT LUNG, UNSPECIFIED PART OF LUNG: ICD-10-CM

## 2018-01-01 DIAGNOSIS — R26.2 INABILITY TO AMBULATE DUE TO MULTIPLE JOINTS: ICD-10-CM

## 2018-01-01 DIAGNOSIS — M48.03 SPINAL STENOSIS OF CERVICOTHORACIC REGION: ICD-10-CM

## 2018-01-01 DIAGNOSIS — K21.9 GASTROESOPHAGEAL REFLUX DISEASE, ESOPHAGITIS PRESENCE NOT SPECIFIED: ICD-10-CM

## 2018-01-01 DIAGNOSIS — J18.9 NOSOCOMIAL PNEUMONIA: Primary | ICD-10-CM

## 2018-01-01 DIAGNOSIS — Z71.89 ADVANCE CARE PLANNING: ICD-10-CM

## 2018-01-01 DIAGNOSIS — R09.02 HYPOXIA: ICD-10-CM

## 2018-01-01 DIAGNOSIS — L89.150 PRESSURE ULCER OF SACRAL REGION, UNSTAGEABLE (HCC): Primary | ICD-10-CM

## 2018-01-01 DIAGNOSIS — Z71.89 GOALS OF CARE, COUNSELING/DISCUSSION: Primary | ICD-10-CM

## 2018-01-01 LAB
ADENOVIRUS F 40/41 PCR: NEGATIVE
ALBUMIN SERPL BCP-MCNC: 2.4 G/DL (ref 3.5–4.8)
ALBUMIN SERPL BCP-MCNC: 2.6 G/DL (ref 3.5–4.8)
ALBUMIN SERPL BCP-MCNC: 2.7 G/DL (ref 3.5–4.8)
ALBUMIN SERPL BCP-MCNC: 3.5 G/DL (ref 3.5–4.8)
ALBUMIN/GLOB SERPL: 0.8 {RATIO} (ref 1–2)
ALBUMIN/GLOB SERPL: 0.8 {RATIO} (ref 1–2)
ALBUMIN/GLOB SERPL: 1.2 {RATIO} (ref 1–2)
ALP SERPL-CCNC: 101 U/L (ref 32–100)
ALP SERPL-CCNC: 69 U/L (ref 32–100)
ALP SERPL-CCNC: 73 U/L (ref 32–100)
ALP SERPL-CCNC: 90 U/L (ref 32–100)
ALT SERPL-CCNC: 16 U/L (ref 14–54)
ALT SERPL-CCNC: 19 U/L (ref 14–54)
ALT SERPL-CCNC: 36 U/L (ref 14–54)
ALT SERPL-CCNC: 46 U/L (ref 14–54)
ANION GAP SERPL CALC-SCNC: 10 MMOL/L (ref 0–18)
ANION GAP SERPL CALC-SCNC: 12 MMOL/L (ref 0–18)
ANION GAP SERPL CALC-SCNC: 13 MMOL/L (ref 0–18)
ANION GAP SERPL CALC-SCNC: 3 MMOL/L (ref 0–18)
ANION GAP SERPL CALC-SCNC: 4 MMOL/L (ref 0–18)
ANION GAP SERPL CALC-SCNC: 4 MMOL/L (ref 0–18)
ANION GAP SERPL CALC-SCNC: 5 MMOL/L (ref 0–18)
ANION GAP SERPL CALC-SCNC: 6 MMOL/L (ref 0–18)
ANION GAP SERPL CALC-SCNC: 7 MMOL/L (ref 0–18)
ANION GAP SERPL CALC-SCNC: 8 MMOL/L (ref 0–18)
ANION GAP SERPL CALC-SCNC: 9 MMOL/L (ref 0–18)
APTT PPP: 39.5 SECONDS (ref 23.2–35.3)
AST SERPL-CCNC: 20 U/L (ref 15–41)
AST SERPL-CCNC: 26 U/L (ref 15–41)
AST SERPL-CCNC: 33 U/L (ref 15–41)
AST SERPL-CCNC: 57 U/L (ref 15–41)
ASTROVIRUS PCR: NEGATIVE
BACTERIA UR QL AUTO: NEGATIVE /HPF
BASE EXCESS BLD CALC-SCNC: 5.1 MMOL/L (ref ?–2)
BASOPHILS # BLD: 0 K/UL (ref 0–0.2)
BASOPHILS # BLD: 0.1 K/UL (ref 0–0.2)
BASOPHILS NFR BLD: 0 %
BASOPHILS NFR BLD: 1 %
BILIRUB DIRECT SERPL-MCNC: 0.1 MG/DL (ref 0–0.2)
BILIRUB SERPL-MCNC: 0.5 MG/DL (ref 0.3–1.2)
BILIRUB SERPL-MCNC: 0.5 MG/DL (ref 0.3–1.2)
BILIRUB SERPL-MCNC: 0.6 MG/DL (ref 0.3–1.2)
BILIRUB SERPL-MCNC: 0.7 MG/DL (ref 0.3–1.2)
BILIRUB UR QL: NEGATIVE
BNP SERPL-MCNC: 139 PG/ML (ref 0–100)
BNP SERPL-MCNC: 230 PG/ML (ref 0–100)
BNP SERPL-MCNC: 38 PG/ML (ref 0–100)
BUN SERPL-MCNC: 10 MG/DL (ref 8–20)
BUN SERPL-MCNC: 10 MG/DL (ref 8–20)
BUN SERPL-MCNC: 11 MG/DL (ref 8–20)
BUN SERPL-MCNC: 12 MG/DL (ref 8–20)
BUN SERPL-MCNC: 13 MG/DL (ref 8–20)
BUN SERPL-MCNC: 14 MG/DL (ref 8–20)
BUN SERPL-MCNC: 15 MG/DL (ref 8–20)
BUN SERPL-MCNC: 16 MG/DL (ref 8–20)
BUN SERPL-MCNC: 17 MG/DL (ref 8–20)
BUN SERPL-MCNC: 18 MG/DL (ref 8–20)
BUN SERPL-MCNC: 19 MG/DL (ref 8–20)
BUN SERPL-MCNC: 21 MG/DL (ref 8–20)
BUN SERPL-MCNC: 22 MG/DL (ref 8–20)
BUN SERPL-MCNC: 22 MG/DL (ref 8–20)
BUN SERPL-MCNC: 23 MG/DL (ref 8–20)
BUN SERPL-MCNC: 5 MG/DL (ref 8–20)
BUN SERPL-MCNC: 6 MG/DL (ref 8–20)
BUN SERPL-MCNC: 6 MG/DL (ref 8–20)
BUN SERPL-MCNC: 7 MG/DL (ref 8–20)
BUN SERPL-MCNC: 8 MG/DL (ref 8–20)
BUN SERPL-MCNC: 8 MG/DL (ref 8–20)
BUN SERPL-MCNC: 9 MG/DL (ref 8–20)
BUN/CREAT SERPL: 13.2 (ref 10–20)
BUN/CREAT SERPL: 13.5 (ref 10–20)
BUN/CREAT SERPL: 15.4 (ref 10–20)
BUN/CREAT SERPL: 16.1 (ref 10–20)
BUN/CREAT SERPL: 16.2 (ref 10–20)
BUN/CREAT SERPL: 17.6 (ref 10–20)
BUN/CREAT SERPL: 18 (ref 10–20)
BUN/CREAT SERPL: 19.5 (ref 10–20)
BUN/CREAT SERPL: 19.5 (ref 10–20)
BUN/CREAT SERPL: 20.9 (ref 10–20)
BUN/CREAT SERPL: 21.4 (ref 10–20)
BUN/CREAT SERPL: 22 (ref 10–20)
BUN/CREAT SERPL: 23.3 (ref 10–20)
BUN/CREAT SERPL: 25.6 (ref 10–20)
BUN/CREAT SERPL: 26.2 (ref 10–20)
BUN/CREAT SERPL: 27.9 (ref 10–20)
BUN/CREAT SERPL: 28.2 (ref 10–20)
BUN/CREAT SERPL: 28.6 (ref 10–20)
BUN/CREAT SERPL: 31.4 (ref 10–20)
BUN/CREAT SERPL: 31.6 (ref 10–20)
BUN/CREAT SERPL: 31.8 (ref 10–20)
BUN/CREAT SERPL: 31.9 (ref 10–20)
BUN/CREAT SERPL: 32.4 (ref 10–20)
BUN/CREAT SERPL: 32.5 (ref 10–20)
BUN/CREAT SERPL: 32.9 (ref 10–20)
BUN/CREAT SERPL: 34.3 (ref 10–20)
BUN/CREAT SERPL: 35.6 (ref 10–20)
BUN/CREAT SERPL: 36.5 (ref 10–20)
BUN/CREAT SERPL: 37.5 (ref 10–20)
BUN/CREAT SERPL: 38.5 (ref 10–20)
BUN/CREAT SERPL: 39.5 (ref 10–20)
BUN/CREAT SERPL: 42.5 (ref 10–20)
BUN/CREAT SERPL: 47.7 (ref 10–20)
BUN/CREAT SERPL: 50 (ref 10–20)
C CAYETANENSIS DNA SPEC QL NAA+PROBE: NEGATIVE
C. DIFFICILE TOXIN A/B PCR: POSITIVE
CALCIUM SERPL-MCNC: 8 MG/DL (ref 8.5–10.5)
CALCIUM SERPL-MCNC: 8.1 MG/DL (ref 8.5–10.5)
CALCIUM SERPL-MCNC: 8.2 MG/DL (ref 8.5–10.5)
CALCIUM SERPL-MCNC: 8.2 MG/DL (ref 8.5–10.5)
CALCIUM SERPL-MCNC: 8.3 MG/DL (ref 8.5–10.5)
CALCIUM SERPL-MCNC: 8.4 MG/DL (ref 8.5–10.5)
CALCIUM SERPL-MCNC: 8.5 MG/DL (ref 8.5–10.5)
CALCIUM SERPL-MCNC: 8.6 MG/DL (ref 8.5–10.5)
CALCIUM SERPL-MCNC: 8.7 MG/DL (ref 8.5–10.5)
CALCIUM SERPL-MCNC: 8.8 MG/DL (ref 8.5–10.5)
CALCIUM SERPL-MCNC: 8.9 MG/DL (ref 8.5–10.5)
CALCIUM SERPL-MCNC: 9.1 MG/DL (ref 8.5–10.5)
CALCIUM SERPL-MCNC: 9.2 MG/DL (ref 8.5–10.5)
CALCIUM SERPL-MCNC: 9.2 MG/DL (ref 8.5–10.5)
CALCIUM SERPL-MCNC: 9.3 MG/DL (ref 8.5–10.5)
CALCIUM SERPL-MCNC: 9.4 MG/DL (ref 8.5–10.5)
CALCIUM SERPL-MCNC: 9.4 MG/DL (ref 8.5–10.5)
CALCIUM SERPL-MCNC: 9.5 MG/DL (ref 8.5–10.5)
CALCIUM SERPL-MCNC: 9.6 MG/DL (ref 8.5–10.5)
CAMPY SP DNA.DIARRHEA STL QL NAA+PROBE: NEGATIVE
CHLORIDE SERPL-SCNC: 100 MMOL/L (ref 95–110)
CHLORIDE SERPL-SCNC: 101 MMOL/L (ref 95–110)
CHLORIDE SERPL-SCNC: 101 MMOL/L (ref 95–110)
CHLORIDE SERPL-SCNC: 102 MMOL/L (ref 95–110)
CHLORIDE SERPL-SCNC: 103 MMOL/L (ref 95–110)
CHLORIDE SERPL-SCNC: 104 MMOL/L (ref 95–110)
CHLORIDE SERPL-SCNC: 105 MMOL/L (ref 95–110)
CHLORIDE SERPL-SCNC: 105 MMOL/L (ref 95–110)
CHLORIDE SERPL-SCNC: 106 MMOL/L (ref 95–110)
CHLORIDE SERPL-SCNC: 107 MMOL/L (ref 95–110)
CHLORIDE SERPL-SCNC: 108 MMOL/L (ref 95–110)
CHLORIDE SERPL-SCNC: 108 MMOL/L (ref 95–110)
CHLORIDE SERPL-SCNC: 109 MMOL/L (ref 95–110)
CHLORIDE SERPL-SCNC: 110 MMOL/L (ref 95–110)
CHLORIDE SERPL-SCNC: 96 MMOL/L (ref 95–110)
CHLORIDE SERPL-SCNC: 97 MMOL/L (ref 95–110)
CHLORIDE SERPL-SCNC: 98 MMOL/L (ref 95–110)
CHLORIDE SERPL-SCNC: 99 MMOL/L (ref 95–110)
CLARITY UR: CLEAR
CO2 SERPL-SCNC: 27 MMOL/L (ref 22–32)
CO2 SERPL-SCNC: 28 MMOL/L (ref 22–32)
CO2 SERPL-SCNC: 29 MMOL/L (ref 22–32)
CO2 SERPL-SCNC: 30 MMOL/L (ref 22–32)
CO2 SERPL-SCNC: 31 MMOL/L (ref 22–32)
CO2 SERPL-SCNC: 32 MMOL/L (ref 22–32)
CO2 SERPL-SCNC: 33 MMOL/L (ref 22–32)
CO2 SERPL-SCNC: 34 MMOL/L (ref 22–32)
CO2 SERPL-SCNC: 34 MMOL/L (ref 22–32)
COLOR UR: YELLOW
CREAT SERPL-MCNC: 0.34 MG/DL (ref 0.5–1.5)
CREAT SERPL-MCNC: 0.35 MG/DL (ref 0.5–1.5)
CREAT SERPL-MCNC: 0.35 MG/DL (ref 0.5–1.5)
CREAT SERPL-MCNC: 0.37 MG/DL (ref 0.5–1.5)
CREAT SERPL-MCNC: 0.38 MG/DL (ref 0.5–1.5)
CREAT SERPL-MCNC: 0.39 MG/DL (ref 0.5–1.5)
CREAT SERPL-MCNC: 0.4 MG/DL (ref 0.5–1.5)
CREAT SERPL-MCNC: 0.4 MG/DL (ref 0.5–1.5)
CREAT SERPL-MCNC: 0.41 MG/DL (ref 0.5–1.5)
CREAT SERPL-MCNC: 0.42 MG/DL (ref 0.5–1.5)
CREAT SERPL-MCNC: 0.43 MG/DL (ref 0.5–1.5)
CREAT SERPL-MCNC: 0.44 MG/DL (ref 0.5–1.5)
CREAT SERPL-MCNC: 0.45 MG/DL (ref 0.5–1.5)
CREAT SERPL-MCNC: 0.47 MG/DL (ref 0.5–1.5)
CREAT SERPL-MCNC: 0.48 MG/DL (ref 0.5–1.5)
CREAT SERPL-MCNC: 0.52 MG/DL (ref 0.5–1.5)
CREAT SERPL-MCNC: 0.52 MG/DL (ref 0.5–1.5)
CREAT SERPL-MCNC: 0.56 MG/DL (ref 0.5–1.5)
CREAT SERPL-MCNC: 0.56 MG/DL (ref 0.5–1.5)
CREAT SERPL-MCNC: 0.61 MG/DL (ref 0.5–1.5)
CREAT SERPL-MCNC: 0.7 MG/DL (ref 0.5–1.5)
CREAT SERPL-MCNC: 0.7 MG/DL (ref 0.5–1.5)
CREAT SERPL-MCNC: 0.74 MG/DL (ref 0.5–1.5)
CRP SERPL-MCNC: 0.5 MG/DL (ref 0–0.9)
CRYPTOSP DNA SPEC QL NAA+PROBE: NEGATIVE
EAEC PAA PLAS AGGR+AATA ST NAA+NON-PRB: NEGATIVE
EC STX1+STX2 + H7 FLIC SPEC NAA+PROBE: NEGATIVE
ENTAMOEBA HISTOLYTICA PCR: NEGATIVE
EOSINOPHIL # BLD: 0 K/UL (ref 0–0.7)
EOSINOPHIL # BLD: 0.1 K/UL (ref 0–0.7)
EOSINOPHIL # BLD: 0.2 K/UL (ref 0–0.7)
EOSINOPHIL # BLD: 0.3 K/UL (ref 0–0.7)
EOSINOPHIL # BLD: 0.4 K/UL (ref 0–0.7)
EOSINOPHIL # BLD: 0.5 K/UL (ref 0–0.7)
EOSINOPHIL NFR BLD: 0 %
EOSINOPHIL NFR BLD: 1 %
EOSINOPHIL NFR BLD: 2 %
EOSINOPHIL NFR BLD: 3 %
EOSINOPHIL NFR BLD: 4 %
EOSINOPHIL NFR BLD: 5 %
EPEC EAE GENE STL QL NAA+NON-PROBE: NEGATIVE
ERYTHROCYTE [DISTWIDTH] IN BLOOD BY AUTOMATED COUNT: 13.1 % (ref 11–15)
ERYTHROCYTE [DISTWIDTH] IN BLOOD BY AUTOMATED COUNT: 13.1 % (ref 11–15)
ERYTHROCYTE [DISTWIDTH] IN BLOOD BY AUTOMATED COUNT: 13.2 % (ref 11–15)
ERYTHROCYTE [DISTWIDTH] IN BLOOD BY AUTOMATED COUNT: 13.3 % (ref 11–15)
ERYTHROCYTE [DISTWIDTH] IN BLOOD BY AUTOMATED COUNT: 13.7 % (ref 11–15)
ERYTHROCYTE [DISTWIDTH] IN BLOOD BY AUTOMATED COUNT: 13.8 % (ref 11–15)
ERYTHROCYTE [DISTWIDTH] IN BLOOD BY AUTOMATED COUNT: 13.9 % (ref 11–15)
ERYTHROCYTE [DISTWIDTH] IN BLOOD BY AUTOMATED COUNT: 13.9 % (ref 11–15)
ERYTHROCYTE [DISTWIDTH] IN BLOOD BY AUTOMATED COUNT: 14 % (ref 11–15)
ERYTHROCYTE [DISTWIDTH] IN BLOOD BY AUTOMATED COUNT: 14 % (ref 11–15)
ERYTHROCYTE [DISTWIDTH] IN BLOOD BY AUTOMATED COUNT: 14.1 % (ref 11–15)
ERYTHROCYTE [DISTWIDTH] IN BLOOD BY AUTOMATED COUNT: 14.3 % (ref 11–15)
ERYTHROCYTE [DISTWIDTH] IN BLOOD BY AUTOMATED COUNT: 14.5 % (ref 11–15)
ERYTHROCYTE [DISTWIDTH] IN BLOOD BY AUTOMATED COUNT: 14.8 % (ref 11–15)
ERYTHROCYTE [DISTWIDTH] IN BLOOD BY AUTOMATED COUNT: 14.9 % (ref 11–15)
ERYTHROCYTE [DISTWIDTH] IN BLOOD BY AUTOMATED COUNT: 15.2 % (ref 11–15)
ERYTHROCYTE [DISTWIDTH] IN BLOOD BY AUTOMATED COUNT: 15.3 % (ref 11–15)
ERYTHROCYTE [DISTWIDTH] IN BLOOD BY AUTOMATED COUNT: 15.6 % (ref 11–15)
ERYTHROCYTE [DISTWIDTH] IN BLOOD BY AUTOMATED COUNT: 15.7 % (ref 11–15)
ERYTHROCYTE [DISTWIDTH] IN BLOOD BY AUTOMATED COUNT: 15.7 % (ref 11–15)
ERYTHROCYTE [DISTWIDTH] IN BLOOD BY AUTOMATED COUNT: 15.9 % (ref 11–15)
ERYTHROCYTE [DISTWIDTH] IN BLOOD BY AUTOMATED COUNT: 16.1 % (ref 11–15)
ERYTHROCYTE [DISTWIDTH] IN BLOOD BY AUTOMATED COUNT: 16.2 % (ref 11–15)
ERYTHROCYTE [DISTWIDTH] IN BLOOD BY AUTOMATED COUNT: 16.3 % (ref 11–15)
ERYTHROCYTE [DISTWIDTH] IN BLOOD BY AUTOMATED COUNT: 16.4 % (ref 11–15)
ERYTHROCYTE [DISTWIDTH] IN BLOOD BY AUTOMATED COUNT: 16.4 % (ref 11–15)
ERYTHROCYTE [DISTWIDTH] IN BLOOD BY AUTOMATED COUNT: 16.5 % (ref 11–15)
ERYTHROCYTE [DISTWIDTH] IN BLOOD BY AUTOMATED COUNT: 16.6 % (ref 11–15)
ERYTHROCYTE [DISTWIDTH] IN BLOOD BY AUTOMATED COUNT: 16.7 % (ref 11–15)
ERYTHROCYTE [SEDIMENTATION RATE] IN BLOOD: 22 MM/HR (ref 0–30)
ETEC LTA+ST1A+ST1B TOX ST NAA+NON-PROBE: NEGATIVE
GIARDIA LAMBLIA PCR: NEGATIVE
GLOBULIN PLAS-MCNC: 3 G/DL (ref 2.5–3.7)
GLOBULIN PLAS-MCNC: 3.1 G/DL (ref 2.5–3.7)
GLOBULIN PLAS-MCNC: 3.4 G/DL (ref 2.5–3.7)
GLUCOSE BLDC GLUCOMTR-MCNC: 144 MG/DL (ref 70–99)
GLUCOSE BLDC GLUCOMTR-MCNC: 94 MG/DL (ref 70–99)
GLUCOSE SERPL-MCNC: 100 MG/DL (ref 70–99)
GLUCOSE SERPL-MCNC: 101 MG/DL (ref 70–99)
GLUCOSE SERPL-MCNC: 101 MG/DL (ref 70–99)
GLUCOSE SERPL-MCNC: 102 MG/DL (ref 70–99)
GLUCOSE SERPL-MCNC: 105 MG/DL (ref 70–99)
GLUCOSE SERPL-MCNC: 106 MG/DL (ref 70–99)
GLUCOSE SERPL-MCNC: 111 MG/DL (ref 70–99)
GLUCOSE SERPL-MCNC: 112 MG/DL (ref 70–99)
GLUCOSE SERPL-MCNC: 112 MG/DL (ref 70–99)
GLUCOSE SERPL-MCNC: 113 MG/DL (ref 70–99)
GLUCOSE SERPL-MCNC: 116 MG/DL (ref 70–99)
GLUCOSE SERPL-MCNC: 116 MG/DL (ref 70–99)
GLUCOSE SERPL-MCNC: 117 MG/DL (ref 70–99)
GLUCOSE SERPL-MCNC: 120 MG/DL (ref 70–99)
GLUCOSE SERPL-MCNC: 120 MG/DL (ref 70–99)
GLUCOSE SERPL-MCNC: 121 MG/DL (ref 70–99)
GLUCOSE SERPL-MCNC: 136 MG/DL (ref 70–99)
GLUCOSE SERPL-MCNC: 142 MG/DL (ref 70–99)
GLUCOSE SERPL-MCNC: 170 MG/DL (ref 70–99)
GLUCOSE SERPL-MCNC: 172 MG/DL (ref 70–99)
GLUCOSE SERPL-MCNC: 84 MG/DL (ref 70–99)
GLUCOSE SERPL-MCNC: 88 MG/DL (ref 70–99)
GLUCOSE SERPL-MCNC: 88 MG/DL (ref 70–99)
GLUCOSE SERPL-MCNC: 90 MG/DL (ref 70–99)
GLUCOSE SERPL-MCNC: 91 MG/DL (ref 70–99)
GLUCOSE SERPL-MCNC: 91 MG/DL (ref 70–99)
GLUCOSE SERPL-MCNC: 92 MG/DL (ref 70–99)
GLUCOSE SERPL-MCNC: 92 MG/DL (ref 70–99)
GLUCOSE SERPL-MCNC: 94 MG/DL (ref 70–99)
GLUCOSE SERPL-MCNC: 94 MG/DL (ref 70–99)
GLUCOSE SERPL-MCNC: 96 MG/DL (ref 70–99)
GLUCOSE SERPL-MCNC: 98 MG/DL (ref 70–99)
GLUCOSE UR-MCNC: NEGATIVE MG/DL
HCO3 BLDA-SCNC: 30.1 MEQ/L (ref 21–27)
HCT VFR BLD AUTO: 27.1 % (ref 35–48)
HCT VFR BLD AUTO: 28.8 % (ref 35–48)
HCT VFR BLD AUTO: 28.9 % (ref 35–48)
HCT VFR BLD AUTO: 29.7 % (ref 35–48)
HCT VFR BLD AUTO: 29.8 % (ref 35–48)
HCT VFR BLD AUTO: 29.9 % (ref 35–48)
HCT VFR BLD AUTO: 30.9 % (ref 35–48)
HCT VFR BLD AUTO: 31 % (ref 35–48)
HCT VFR BLD AUTO: 31 % (ref 35–48)
HCT VFR BLD AUTO: 31.1 % (ref 35–48)
HCT VFR BLD AUTO: 31.2 % (ref 35–48)
HCT VFR BLD AUTO: 31.3 % (ref 35–48)
HCT VFR BLD AUTO: 31.3 % (ref 35–48)
HCT VFR BLD AUTO: 31.4 % (ref 35–48)
HCT VFR BLD AUTO: 31.4 % (ref 35–48)
HCT VFR BLD AUTO: 31.6 % (ref 35–48)
HCT VFR BLD AUTO: 31.6 % (ref 35–48)
HCT VFR BLD AUTO: 31.7 % (ref 35–48)
HCT VFR BLD AUTO: 31.7 % (ref 35–48)
HCT VFR BLD AUTO: 32.3 % (ref 35–48)
HCT VFR BLD AUTO: 32.4 % (ref 35–48)
HCT VFR BLD AUTO: 32.6 % (ref 35–48)
HCT VFR BLD AUTO: 32.8 % (ref 35–48)
HCT VFR BLD AUTO: 33.3 % (ref 35–48)
HCT VFR BLD AUTO: 34.4 % (ref 35–48)
HCT VFR BLD AUTO: 35.3 % (ref 35–48)
HCT VFR BLD AUTO: 35.4 % (ref 35–48)
HCT VFR BLD AUTO: 35.5 % (ref 35–48)
HCT VFR BLD AUTO: 35.6 % (ref 35–48)
HCT VFR BLD AUTO: 36.5 % (ref 35–48)
HCT VFR BLD AUTO: 36.7 % (ref 35–48)
HCT VFR BLD AUTO: 36.8 % (ref 35–48)
HCT VFR BLD AUTO: 40.6 % (ref 35–48)
HGB BLD-MCNC: 10.1 G/DL (ref 12–16)
HGB BLD-MCNC: 10.1 G/DL (ref 12–16)
HGB BLD-MCNC: 10.2 G/DL (ref 12–16)
HGB BLD-MCNC: 10.2 G/DL (ref 12–16)
HGB BLD-MCNC: 10.3 G/DL (ref 12–16)
HGB BLD-MCNC: 10.4 G/DL (ref 12–16)
HGB BLD-MCNC: 10.4 G/DL (ref 12–16)
HGB BLD-MCNC: 10.5 G/DL (ref 12–16)
HGB BLD-MCNC: 10.6 G/DL (ref 12–16)
HGB BLD-MCNC: 10.6 G/DL (ref 12–16)
HGB BLD-MCNC: 10.7 G/DL (ref 12–16)
HGB BLD-MCNC: 10.7 G/DL (ref 12–16)
HGB BLD-MCNC: 10.8 G/DL (ref 12–16)
HGB BLD-MCNC: 10.9 G/DL (ref 12–16)
HGB BLD-MCNC: 10.9 G/DL (ref 12–16)
HGB BLD-MCNC: 11.5 G/DL (ref 12–16)
HGB BLD-MCNC: 11.5 G/DL (ref 12–16)
HGB BLD-MCNC: 11.6 G/DL (ref 12–16)
HGB BLD-MCNC: 11.7 G/DL (ref 12–16)
HGB BLD-MCNC: 11.8 G/DL (ref 12–16)
HGB BLD-MCNC: 12.1 G/DL (ref 12–16)
HGB BLD-MCNC: 12.2 G/DL (ref 12–16)
HGB BLD-MCNC: 12.3 G/DL (ref 12–16)
HGB BLD-MCNC: 13.6 G/DL (ref 12–16)
HGB BLD-MCNC: 9.2 G/DL (ref 12–16)
HGB BLD-MCNC: 9.3 G/DL (ref 12–16)
HGB BLD-MCNC: 9.5 G/DL (ref 12–16)
HGB BLD-MCNC: 9.5 G/DL (ref 12–16)
HGB BLD-MCNC: 9.6 G/DL (ref 12–16)
HGB BLD-MCNC: 9.6 G/DL (ref 12–16)
HGB BLD-MCNC: 9.9 G/DL (ref 12–16)
HGB BLD-MCNC: 9.9 G/DL (ref 12–16)
HGB UR QL STRIP.AUTO: NEGATIVE
HYALINE CASTS #/AREA URNS AUTO: 1 /LPF
HYALINE CASTS #/AREA URNS AUTO: 1 /LPF
HYALINE CASTS #/AREA URNS AUTO: 4 /LPF
INR BLD: 1.2 (ref 0.9–1.2)
KETONES UR-MCNC: NEGATIVE MG/DL
L PNEUMO AG UR QL: NEGATIVE
LACTATE SERPL-SCNC: 1.8 MMOL/L (ref 0.5–2.2)
LACTATE SERPL-SCNC: 2.6 MMOL/L (ref 0.5–2.2)
LEUKOCYTE ESTERASE UR QL STRIP.AUTO: NEGATIVE
LYMPHOCYTES # BLD: 0.2 K/UL (ref 1–4)
LYMPHOCYTES # BLD: 0.3 K/UL (ref 1–4)
LYMPHOCYTES # BLD: 0.3 K/UL (ref 1–4)
LYMPHOCYTES # BLD: 0.4 K/UL (ref 1–4)
LYMPHOCYTES # BLD: 0.5 K/UL (ref 1–4)
LYMPHOCYTES # BLD: 0.6 K/UL (ref 1–4)
LYMPHOCYTES # BLD: 0.7 K/UL (ref 1–4)
LYMPHOCYTES # BLD: 0.8 K/UL (ref 1–4)
LYMPHOCYTES # BLD: 0.9 K/UL (ref 1–4)
LYMPHOCYTES # BLD: 1 K/UL (ref 1–4)
LYMPHOCYTES NFR BLD: 10 %
LYMPHOCYTES NFR BLD: 14 %
LYMPHOCYTES NFR BLD: 2 %
LYMPHOCYTES NFR BLD: 3 %
LYMPHOCYTES NFR BLD: 4 %
LYMPHOCYTES NFR BLD: 5 %
LYMPHOCYTES NFR BLD: 6 %
LYMPHOCYTES NFR BLD: 7 %
LYMPHOCYTES NFR BLD: 9 %
MAGNESIUM SERPL-MCNC: 1.4 MG/DL (ref 1.8–2.5)
MAGNESIUM SERPL-MCNC: 1.5 MG/DL (ref 1.8–2.5)
MAGNESIUM SERPL-MCNC: 1.6 MG/DL (ref 1.8–2.5)
MAGNESIUM SERPL-MCNC: 1.7 MG/DL (ref 1.8–2.5)
MAGNESIUM SERPL-MCNC: 1.8 MG/DL (ref 1.8–2.5)
MAGNESIUM SERPL-MCNC: 1.9 MG/DL (ref 1.8–2.5)
MCH RBC QN AUTO: 29.7 PG (ref 27–32)
MCH RBC QN AUTO: 29.8 PG (ref 27–32)
MCH RBC QN AUTO: 29.8 PG (ref 27–32)
MCH RBC QN AUTO: 30 PG (ref 27–32)
MCH RBC QN AUTO: 30.1 PG (ref 27–32)
MCH RBC QN AUTO: 30.1 PG (ref 27–32)
MCH RBC QN AUTO: 30.2 PG (ref 27–32)
MCH RBC QN AUTO: 30.3 PG (ref 27–32)
MCH RBC QN AUTO: 30.3 PG (ref 27–32)
MCH RBC QN AUTO: 30.4 PG (ref 27–32)
MCH RBC QN AUTO: 30.5 PG (ref 27–32)
MCH RBC QN AUTO: 30.5 PG (ref 27–32)
MCH RBC QN AUTO: 30.6 PG (ref 27–32)
MCH RBC QN AUTO: 30.7 PG (ref 27–32)
MCH RBC QN AUTO: 30.7 PG (ref 27–32)
MCH RBC QN AUTO: 30.8 PG (ref 27–32)
MCH RBC QN AUTO: 30.9 PG (ref 27–32)
MCH RBC QN AUTO: 31 PG (ref 27–32)
MCH RBC QN AUTO: 31.1 PG (ref 27–32)
MCH RBC QN AUTO: 31.1 PG (ref 27–32)
MCH RBC QN AUTO: 31.2 PG (ref 27–32)
MCH RBC QN AUTO: 31.2 PG (ref 27–32)
MCH RBC QN AUTO: 31.3 PG (ref 27–32)
MCH RBC QN AUTO: 31.3 PG (ref 27–32)
MCHC RBC AUTO-ENTMCNC: 31.5 G/DL (ref 32–37)
MCHC RBC AUTO-ENTMCNC: 31.7 G/DL (ref 32–37)
MCHC RBC AUTO-ENTMCNC: 31.9 G/DL (ref 32–37)
MCHC RBC AUTO-ENTMCNC: 31.9 G/DL (ref 32–37)
MCHC RBC AUTO-ENTMCNC: 32 G/DL (ref 32–37)
MCHC RBC AUTO-ENTMCNC: 32 G/DL (ref 32–37)
MCHC RBC AUTO-ENTMCNC: 32.1 G/DL (ref 32–37)
MCHC RBC AUTO-ENTMCNC: 32.4 G/DL (ref 32–37)
MCHC RBC AUTO-ENTMCNC: 32.5 G/DL (ref 32–37)
MCHC RBC AUTO-ENTMCNC: 32.6 G/DL (ref 32–37)
MCHC RBC AUTO-ENTMCNC: 32.6 G/DL (ref 32–37)
MCHC RBC AUTO-ENTMCNC: 32.7 G/DL (ref 32–37)
MCHC RBC AUTO-ENTMCNC: 32.7 G/DL (ref 32–37)
MCHC RBC AUTO-ENTMCNC: 32.9 G/DL (ref 32–37)
MCHC RBC AUTO-ENTMCNC: 33 G/DL (ref 32–37)
MCHC RBC AUTO-ENTMCNC: 33.1 G/DL (ref 32–37)
MCHC RBC AUTO-ENTMCNC: 33.2 G/DL (ref 32–37)
MCHC RBC AUTO-ENTMCNC: 33.3 G/DL (ref 32–37)
MCHC RBC AUTO-ENTMCNC: 33.3 G/DL (ref 32–37)
MCHC RBC AUTO-ENTMCNC: 33.4 G/DL (ref 32–37)
MCHC RBC AUTO-ENTMCNC: 33.5 G/DL (ref 32–37)
MCHC RBC AUTO-ENTMCNC: 33.6 G/DL (ref 32–37)
MCHC RBC AUTO-ENTMCNC: 33.6 G/DL (ref 32–37)
MCHC RBC AUTO-ENTMCNC: 33.7 G/DL (ref 32–37)
MCHC RBC AUTO-ENTMCNC: 34.3 G/DL (ref 32–37)
MCV RBC AUTO: 89.9 FL (ref 80–100)
MCV RBC AUTO: 90.1 FL (ref 80–100)
MCV RBC AUTO: 91 FL (ref 80–100)
MCV RBC AUTO: 91.1 FL (ref 80–100)
MCV RBC AUTO: 91.1 FL (ref 80–100)
MCV RBC AUTO: 91.2 FL (ref 80–100)
MCV RBC AUTO: 91.8 FL (ref 80–100)
MCV RBC AUTO: 92 FL (ref 80–100)
MCV RBC AUTO: 92.1 FL (ref 80–100)
MCV RBC AUTO: 92.6 FL (ref 80–100)
MCV RBC AUTO: 92.6 FL (ref 80–100)
MCV RBC AUTO: 92.7 FL (ref 80–100)
MCV RBC AUTO: 92.8 FL (ref 80–100)
MCV RBC AUTO: 93 FL (ref 80–100)
MCV RBC AUTO: 93.1 FL (ref 80–100)
MCV RBC AUTO: 93.2 FL (ref 80–100)
MCV RBC AUTO: 93.3 FL (ref 80–100)
MCV RBC AUTO: 93.3 FL (ref 80–100)
MCV RBC AUTO: 93.4 FL (ref 80–100)
MCV RBC AUTO: 93.4 FL (ref 80–100)
MCV RBC AUTO: 93.5 FL (ref 80–100)
MCV RBC AUTO: 93.7 FL (ref 80–100)
MCV RBC AUTO: 94.1 FL (ref 80–100)
MCV RBC AUTO: 94.1 FL (ref 80–100)
MCV RBC AUTO: 94.2 FL (ref 80–100)
MCV RBC AUTO: 94.3 FL (ref 80–100)
MCV RBC AUTO: 94.3 FL (ref 80–100)
MCV RBC AUTO: 94.6 FL (ref 80–100)
MCV RBC AUTO: 94.7 FL (ref 80–100)
MCV RBC AUTO: 94.8 FL (ref 80–100)
MCV RBC AUTO: 95.1 FL (ref 80–100)
MCV RBC AUTO: 96 FL (ref 80–100)
MODIFIED ALLEN TEST: POSITIVE
MONOCYTES # BLD: 0.3 K/UL (ref 0–1)
MONOCYTES # BLD: 0.4 K/UL (ref 0–1)
MONOCYTES # BLD: 0.6 K/UL (ref 0–1)
MONOCYTES # BLD: 0.6 K/UL (ref 0–1)
MONOCYTES # BLD: 0.7 K/UL (ref 0–1)
MONOCYTES # BLD: 0.8 K/UL (ref 0–1)
MONOCYTES # BLD: 0.9 K/UL (ref 0–1)
MONOCYTES # BLD: 1 K/UL (ref 0–1)
MONOCYTES # BLD: 1.1 K/UL (ref 0–1)
MONOCYTES # BLD: 1.1 K/UL (ref 0–1)
MONOCYTES # BLD: 1.2 K/UL (ref 0–1)
MONOCYTES # BLD: 1.2 K/UL (ref 0–1)
MONOCYTES # BLD: 1.3 K/UL (ref 0–1)
MONOCYTES # BLD: 1.4 K/UL (ref 0–1)
MONOCYTES # BLD: 1.4 K/UL (ref 0–1)
MONOCYTES # BLD: 1.6 K/UL (ref 0–1)
MONOCYTES NFR BLD: 10 %
MONOCYTES NFR BLD: 10 %
MONOCYTES NFR BLD: 11 %
MONOCYTES NFR BLD: 12 %
MONOCYTES NFR BLD: 12 %
MONOCYTES NFR BLD: 4 %
MONOCYTES NFR BLD: 6 %
MONOCYTES NFR BLD: 7 %
MONOCYTES NFR BLD: 8 %
MONOCYTES NFR BLD: 9 %
MRSA DNA SPEC QL NAA+PROBE: NEGATIVE
NEUTROPHILS # BLD AUTO: 10 K/UL (ref 1.8–7.7)
NEUTROPHILS # BLD AUTO: 10.1 K/UL (ref 1.8–7.7)
NEUTROPHILS # BLD AUTO: 10.3 K/UL (ref 1.8–7.7)
NEUTROPHILS # BLD AUTO: 10.4 K/UL (ref 1.8–7.7)
NEUTROPHILS # BLD AUTO: 10.6 K/UL (ref 1.8–7.7)
NEUTROPHILS # BLD AUTO: 10.8 K/UL (ref 1.8–7.7)
NEUTROPHILS # BLD AUTO: 11.4 K/UL (ref 1.8–7.7)
NEUTROPHILS # BLD AUTO: 12 K/UL (ref 1.8–7.7)
NEUTROPHILS # BLD AUTO: 12.2 K/UL (ref 1.8–7.7)
NEUTROPHILS # BLD AUTO: 12.2 K/UL (ref 1.8–7.7)
NEUTROPHILS # BLD AUTO: 12.3 K/UL (ref 1.8–7.7)
NEUTROPHILS # BLD AUTO: 12.6 K/UL (ref 1.8–7.7)
NEUTROPHILS # BLD AUTO: 13 K/UL (ref 1.8–7.7)
NEUTROPHILS # BLD AUTO: 13 K/UL (ref 1.8–7.7)
NEUTROPHILS # BLD AUTO: 13.3 K/UL (ref 1.8–7.7)
NEUTROPHILS # BLD AUTO: 13.6 K/UL (ref 1.8–7.7)
NEUTROPHILS # BLD AUTO: 16.1 K/UL (ref 1.8–7.7)
NEUTROPHILS # BLD AUTO: 3.5 K/UL (ref 1.8–7.7)
NEUTROPHILS # BLD AUTO: 4.1 K/UL (ref 1.8–7.7)
NEUTROPHILS # BLD AUTO: 5.6 K/UL (ref 1.8–7.7)
NEUTROPHILS # BLD AUTO: 7 K/UL (ref 1.8–7.7)
NEUTROPHILS # BLD AUTO: 7.1 K/UL (ref 1.8–7.7)
NEUTROPHILS # BLD AUTO: 7.5 K/UL (ref 1.8–7.7)
NEUTROPHILS # BLD AUTO: 7.9 K/UL (ref 1.8–7.7)
NEUTROPHILS # BLD AUTO: 8 K/UL (ref 1.8–7.7)
NEUTROPHILS # BLD AUTO: 8.5 K/UL (ref 1.8–7.7)
NEUTROPHILS # BLD AUTO: 8.8 K/UL (ref 1.8–7.7)
NEUTROPHILS # BLD AUTO: 8.9 K/UL (ref 1.8–7.7)
NEUTROPHILS # BLD AUTO: 9.2 K/UL (ref 1.8–7.7)
NEUTROPHILS # BLD AUTO: 9.3 K/UL (ref 1.8–7.7)
NEUTROPHILS # BLD AUTO: 9.4 K/UL (ref 1.8–7.7)
NEUTROPHILS # BLD AUTO: 9.5 K/UL (ref 1.8–7.7)
NEUTROPHILS # BLD AUTO: 9.7 K/UL (ref 1.8–7.7)
NEUTROPHILS # BLD AUTO: 9.8 K/UL (ref 1.8–7.7)
NEUTROPHILS # BLD AUTO: 9.9 K/UL (ref 1.8–7.7)
NEUTROPHILS NFR BLD: 67 %
NEUTROPHILS NFR BLD: 69 %
NEUTROPHILS NFR BLD: 78 %
NEUTROPHILS NFR BLD: 78 %
NEUTROPHILS NFR BLD: 81 %
NEUTROPHILS NFR BLD: 82 %
NEUTROPHILS NFR BLD: 83 %
NEUTROPHILS NFR BLD: 84 %
NEUTROPHILS NFR BLD: 85 %
NEUTROPHILS NFR BLD: 85 %
NEUTROPHILS NFR BLD: 86 %
NEUTROPHILS NFR BLD: 87 %
NEUTROPHILS NFR BLD: 87 %
NEUTROPHILS NFR BLD: 88 %
NEUTROPHILS NFR BLD: 89 %
NEUTROPHILS NFR BLD: 90 %
NEUTROPHILS NFR BLD: 92 %
NEUTS BAND NFR BLD: 18 %
NITRITE UR QL STRIP.AUTO: NEGATIVE
NOROVIRUS GI/GII PCR: NEGATIVE
O2 CT BLD-SCNC: 14.5 VOL% (ref 15–23)
OSMOLALITY UR CALC.SUM OF ELEC: 277 MOSM/KG (ref 275–295)
OSMOLALITY UR CALC.SUM OF ELEC: 277 MOSM/KG (ref 275–295)
OSMOLALITY UR CALC.SUM OF ELEC: 278 MOSM/KG (ref 275–295)
OSMOLALITY UR CALC.SUM OF ELEC: 281 MOSM/KG (ref 275–295)
OSMOLALITY UR CALC.SUM OF ELEC: 282 MOSM/KG (ref 275–295)
OSMOLALITY UR CALC.SUM OF ELEC: 283 MOSM/KG (ref 275–295)
OSMOLALITY UR CALC.SUM OF ELEC: 283 MOSM/KG (ref 275–295)
OSMOLALITY UR CALC.SUM OF ELEC: 284 MOSM/KG (ref 275–295)
OSMOLALITY UR CALC.SUM OF ELEC: 286 MOSM/KG (ref 275–295)
OSMOLALITY UR CALC.SUM OF ELEC: 288 MOSM/KG (ref 275–295)
OSMOLALITY UR CALC.SUM OF ELEC: 290 MOSM/KG (ref 275–295)
OSMOLALITY UR CALC.SUM OF ELEC: 290 MOSM/KG (ref 275–295)
OSMOLALITY UR CALC.SUM OF ELEC: 292 MOSM/KG (ref 275–295)
OSMOLALITY UR CALC.SUM OF ELEC: 294 MOSM/KG (ref 275–295)
OSMOLALITY UR CALC.SUM OF ELEC: 295 MOSM/KG (ref 275–295)
OSMOLALITY UR CALC.SUM OF ELEC: 299 MOSM/KG (ref 275–295)
OSMOLALITY UR CALC.SUM OF ELEC: 300 MOSM/KG (ref 275–295)
OSMOLALITY UR CALC.SUM OF ELEC: 301 MOSM/KG (ref 275–295)
OSMOLALITY UR CALC.SUM OF ELEC: 301 MOSM/KG (ref 275–295)
OSMOLALITY UR CALC.SUM OF ELEC: 303 MOSM/KG (ref 275–295)
OXYGEN LITERS/MINUTE: 5 L/MIN
P SHIGELLOIDES DNA STL QL NAA+PROBE: NEGATIVE
PATIENT FASTING: YES
PCO2 BLDA: 49 MM HG (ref 35–45)
PH BLDA: 7.41 [PH] (ref 7.35–7.45)
PH UR: 5 [PH] (ref 5–8)
PH UR: 6 [PH] (ref 5–8)
PH UR: 6 [PH] (ref 5–8)
PHOSPHATE SERPL-MCNC: 2.2 MG/DL (ref 2.4–4.7)
PHOSPHATE SERPL-MCNC: 3.1 MG/DL (ref 2.4–4.7)
PLATELET # BLD AUTO: 168 K/UL (ref 140–400)
PLATELET # BLD AUTO: 171 K/UL (ref 140–400)
PLATELET # BLD AUTO: 185 K/UL (ref 140–400)
PLATELET # BLD AUTO: 194 K/UL (ref 140–400)
PLATELET # BLD AUTO: 194 K/UL (ref 140–400)
PLATELET # BLD AUTO: 195 K/UL (ref 140–400)
PLATELET # BLD AUTO: 203 K/UL (ref 140–400)
PLATELET # BLD AUTO: 217 K/UL (ref 140–400)
PLATELET # BLD AUTO: 226 K/UL (ref 140–400)
PLATELET # BLD AUTO: 231 K/UL (ref 140–400)
PLATELET # BLD AUTO: 254 K/UL (ref 140–400)
PLATELET # BLD AUTO: 269 K/UL (ref 140–400)
PLATELET # BLD AUTO: 270 K/UL (ref 140–400)
PLATELET # BLD AUTO: 281 K/UL (ref 140–400)
PLATELET # BLD AUTO: 281 K/UL (ref 140–400)
PLATELET # BLD AUTO: 285 K/UL (ref 140–400)
PLATELET # BLD AUTO: 289 K/UL (ref 140–400)
PLATELET # BLD AUTO: 310 K/UL (ref 140–400)
PLATELET # BLD AUTO: 317 K/UL (ref 140–400)
PLATELET # BLD AUTO: 322 K/UL (ref 140–400)
PLATELET # BLD AUTO: 334 K/UL (ref 140–400)
PLATELET # BLD AUTO: 335 K/UL (ref 140–400)
PLATELET # BLD AUTO: 342 K/UL (ref 140–400)
PLATELET # BLD AUTO: 343 K/UL (ref 140–400)
PLATELET # BLD AUTO: 357 K/UL (ref 140–400)
PLATELET # BLD AUTO: 360 K/UL (ref 140–400)
PLATELET # BLD AUTO: 369 K/UL (ref 140–400)
PLATELET # BLD AUTO: 382 K/UL (ref 140–400)
PLATELET # BLD AUTO: 392 K/UL (ref 140–400)
PLATELET # BLD AUTO: 397 K/UL (ref 140–400)
PLATELET # BLD AUTO: 419 K/UL (ref 140–400)
PLATELET # BLD AUTO: 451 K/UL (ref 140–400)
PLATELET # BLD AUTO: 469 K/UL (ref 140–400)
PMV BLD AUTO: 7 FL (ref 7.4–10.3)
PMV BLD AUTO: 7.2 FL (ref 7.4–10.3)
PMV BLD AUTO: 7.3 FL (ref 7.4–10.3)
PMV BLD AUTO: 7.4 FL (ref 7.4–10.3)
PMV BLD AUTO: 7.5 FL (ref 7.4–10.3)
PMV BLD AUTO: 7.6 FL (ref 7.4–10.3)
PMV BLD AUTO: 7.7 FL (ref 7.4–10.3)
PMV BLD AUTO: 7.8 FL (ref 7.4–10.3)
PMV BLD AUTO: 7.8 FL (ref 7.4–10.3)
PMV BLD AUTO: 7.9 FL (ref 7.4–10.3)
PMV BLD AUTO: 8 FL (ref 7.4–10.3)
PMV BLD AUTO: 8.1 FL (ref 7.4–10.3)
PMV BLD AUTO: 8.2 FL (ref 7.4–10.3)
PMV BLD AUTO: 8.2 FL (ref 7.4–10.3)
PMV BLD AUTO: 8.4 FL (ref 7.4–10.3)
PO2 BLDA: 74 MM HG (ref 80–100)
PO2 SATN ADJ TO 0.5 BLD: 25 MMHG (ref 24–28)
POTASSIUM SERPL-SCNC: 2.8 MMOL/L (ref 3.3–5.1)
POTASSIUM SERPL-SCNC: 2.9 MMOL/L (ref 3.3–5.1)
POTASSIUM SERPL-SCNC: 3 MMOL/L (ref 3.3–5.1)
POTASSIUM SERPL-SCNC: 3.1 MMOL/L (ref 3.3–5.1)
POTASSIUM SERPL-SCNC: 3.2 MMOL/L (ref 3.3–5.1)
POTASSIUM SERPL-SCNC: 3.3 MMOL/L (ref 3.3–5.1)
POTASSIUM SERPL-SCNC: 3.3 MMOL/L (ref 3.3–5.1)
POTASSIUM SERPL-SCNC: 3.4 MMOL/L (ref 3.3–5.1)
POTASSIUM SERPL-SCNC: 3.4 MMOL/L (ref 3.3–5.1)
POTASSIUM SERPL-SCNC: 3.5 MMOL/L (ref 3.3–5.1)
POTASSIUM SERPL-SCNC: 3.5 MMOL/L (ref 3.3–5.1)
POTASSIUM SERPL-SCNC: 3.6 MMOL/L (ref 3.3–5.1)
POTASSIUM SERPL-SCNC: 3.7 MMOL/L (ref 3.3–5.1)
POTASSIUM SERPL-SCNC: 3.8 MMOL/L (ref 3.3–5.1)
POTASSIUM SERPL-SCNC: 3.9 MMOL/L (ref 3.3–5.1)
POTASSIUM SERPL-SCNC: 4 MMOL/L (ref 3.3–5.1)
POTASSIUM SERPL-SCNC: 4.1 MMOL/L (ref 3.3–5.1)
POTASSIUM SERPL-SCNC: 4.2 MMOL/L (ref 3.3–5.1)
POTASSIUM SERPL-SCNC: 4.3 MMOL/L (ref 3.3–5.1)
POTASSIUM SERPL-SCNC: 4.3 MMOL/L (ref 3.3–5.1)
POTASSIUM SERPL-SCNC: 4.4 MMOL/L (ref 3.3–5.1)
POTASSIUM SERPL-SCNC: 4.4 MMOL/L (ref 3.3–5.1)
POTASSIUM SERPL-SCNC: 4.5 MMOL/L (ref 3.3–5.1)
POTASSIUM SERPL-SCNC: 4.6 MMOL/L (ref 3.3–5.1)
POTASSIUM SERPL-SCNC: 4.7 MMOL/L (ref 3.3–5.1)
POTASSIUM SERPL-SCNC: 4.8 MMOL/L (ref 3.3–5.1)
POTASSIUM SERPL-SCNC: 4.9 MMOL/L (ref 3.3–5.1)
PROCALCITONIN SERPL-MCNC: <0.05 NG/ML (ref ?–0.11)
PROT SERPL-MCNC: 5.5 G/DL (ref 5.9–8.4)
PROT SERPL-MCNC: 6.1 G/DL (ref 5.9–8.4)
PROT SERPL-MCNC: 6.5 G/DL (ref 5.9–8.4)
PROT SERPL-MCNC: 6.9 G/DL (ref 5.9–8.4)
PROT UR-MCNC: 30 MG/DL
PROT UR-MCNC: NEGATIVE MG/DL
PROTHROMBIN TIME: 15.3 SECONDS (ref 11.8–14.5)
PUNCTURE CHARGE: YES
RBC # BLD AUTO: 2.97 M/UL (ref 3.7–5.4)
RBC # BLD AUTO: 3.09 M/UL (ref 3.7–5.4)
RBC # BLD AUTO: 3.1 M/UL (ref 3.7–5.4)
RBC # BLD AUTO: 3.15 M/UL (ref 3.7–5.4)
RBC # BLD AUTO: 3.16 M/UL (ref 3.7–5.4)
RBC # BLD AUTO: 3.22 M/UL (ref 3.7–5.4)
RBC # BLD AUTO: 3.29 M/UL (ref 3.7–5.4)
RBC # BLD AUTO: 3.3 M/UL (ref 3.7–5.4)
RBC # BLD AUTO: 3.31 M/UL (ref 3.7–5.4)
RBC # BLD AUTO: 3.33 M/UL (ref 3.7–5.4)
RBC # BLD AUTO: 3.37 M/UL (ref 3.7–5.4)
RBC # BLD AUTO: 3.39 M/UL (ref 3.7–5.4)
RBC # BLD AUTO: 3.39 M/UL (ref 3.7–5.4)
RBC # BLD AUTO: 3.41 M/UL (ref 3.7–5.4)
RBC # BLD AUTO: 3.41 M/UL (ref 3.7–5.4)
RBC # BLD AUTO: 3.47 M/UL (ref 3.7–5.4)
RBC # BLD AUTO: 3.48 M/UL (ref 3.7–5.4)
RBC # BLD AUTO: 3.5 M/UL (ref 3.7–5.4)
RBC # BLD AUTO: 3.51 M/UL (ref 3.7–5.4)
RBC # BLD AUTO: 3.54 M/UL (ref 3.7–5.4)
RBC # BLD AUTO: 3.55 M/UL (ref 3.7–5.4)
RBC # BLD AUTO: 3.56 M/UL (ref 3.7–5.4)
RBC # BLD AUTO: 3.72 M/UL (ref 3.7–5.4)
RBC # BLD AUTO: 3.74 M/UL (ref 3.7–5.4)
RBC # BLD AUTO: 3.8 M/UL (ref 3.7–5.4)
RBC # BLD AUTO: 3.81 M/UL (ref 3.7–5.4)
RBC # BLD AUTO: 3.93 M/UL (ref 3.7–5.4)
RBC # BLD AUTO: 3.95 M/UL (ref 3.7–5.4)
RBC # BLD AUTO: 3.95 M/UL (ref 3.7–5.4)
RBC # BLD AUTO: 4.01 M/UL (ref 3.7–5.4)
RBC # BLD AUTO: 4.51 M/UL (ref 3.7–5.4)
RBC #/AREA URNS AUTO: 1 /HPF
RBC #/AREA URNS AUTO: <1 /HPF
RBC #/AREA URNS AUTO: <1 /HPF
ROTAVIRUS A PCR: NEGATIVE
SALMONELLA DNA SPEC QL NAA+PROBE: NEGATIVE
SAO2 % BLDA: 95.4 % (ref 94–100)
SAPOVIRUS PCR: NEGATIVE
SHIGELLA SP+EIEC IPAH ST NAA+NON-PROBE: NEGATIVE
SODIUM SERPL-SCNC: 133 MMOL/L (ref 136–144)
SODIUM SERPL-SCNC: 134 MMOL/L (ref 136–144)
SODIUM SERPL-SCNC: 134 MMOL/L (ref 136–144)
SODIUM SERPL-SCNC: 135 MMOL/L (ref 136–144)
SODIUM SERPL-SCNC: 136 MMOL/L (ref 136–144)
SODIUM SERPL-SCNC: 137 MMOL/L (ref 136–144)
SODIUM SERPL-SCNC: 138 MMOL/L (ref 136–144)
SODIUM SERPL-SCNC: 139 MMOL/L (ref 136–144)
SODIUM SERPL-SCNC: 140 MMOL/L (ref 136–144)
SODIUM SERPL-SCNC: 141 MMOL/L (ref 136–144)
SODIUM SERPL-SCNC: 142 MMOL/L (ref 136–144)
SODIUM SERPL-SCNC: 142 MMOL/L (ref 136–144)
SODIUM SERPL-SCNC: 143 MMOL/L (ref 136–144)
SODIUM SERPL-SCNC: 144 MMOL/L (ref 136–144)
SODIUM SERPL-SCNC: 145 MMOL/L (ref 136–144)
SODIUM SERPL-SCNC: 146 MMOL/L (ref 136–144)
SP GR UR STRIP: 1.01 (ref 1–1.03)
SP GR UR STRIP: 1.02 (ref 1–1.03)
STREP PNEUMO ANTIGEN, URINE: NEGATIVE
TROPONIN I SERPL-MCNC: 0.01 NG/ML (ref ?–0.03)
TROPONIN I SERPL-MCNC: 0.01 NG/ML (ref ?–0.03)
UROBILINOGEN UR STRIP-ACNC: 2
UROBILINOGEN UR STRIP-ACNC: <2
V CHOLERAE DNA SPEC QL NAA+PROBE: NEGATIVE
VANCOMYCIN TROUGH SERPL-MCNC: 13.5 MCG/ML (ref 10–20)
VANCOMYCIN TROUGH SERPL-MCNC: 15.7 MCG/ML (ref 10–20)
VANCOMYCIN TROUGH SERPL-MCNC: 16.2 MCG/ML (ref 10–20)
VANCOMYCIN TROUGH SERPL-MCNC: 19.2 MCG/ML (ref 10–20)
VANCOMYCIN TROUGH SERPL-MCNC: 3.5 MCG/ML (ref 10–20)
VIBRIO DNA SPEC NAA+PROBE: NEGATIVE
VIT C UR-MCNC: 20 MG/DL
VIT C UR-MCNC: NEGATIVE MG/DL
WBC # BLD AUTO: 10.2 K/UL (ref 4–11)
WBC # BLD AUTO: 10.3 K/UL (ref 4–11)
WBC # BLD AUTO: 10.7 K/UL (ref 4–11)
WBC # BLD AUTO: 10.8 K/UL (ref 4–11)
WBC # BLD AUTO: 11 K/UL (ref 4–11)
WBC # BLD AUTO: 11.2 K/UL (ref 4–11)
WBC # BLD AUTO: 11.2 K/UL (ref 4–11)
WBC # BLD AUTO: 11.6 K/UL (ref 4–11)
WBC # BLD AUTO: 11.6 K/UL (ref 4–11)
WBC # BLD AUTO: 11.7 K/UL (ref 4–11)
WBC # BLD AUTO: 11.9 K/UL (ref 4–11)
WBC # BLD AUTO: 12.1 K/UL (ref 4–11)
WBC # BLD AUTO: 12.2 K/UL (ref 4–11)
WBC # BLD AUTO: 12.4 K/UL (ref 4–11)
WBC # BLD AUTO: 12.5 K/UL (ref 4–11)
WBC # BLD AUTO: 12.9 K/UL (ref 4–11)
WBC # BLD AUTO: 12.9 K/UL (ref 4–11)
WBC # BLD AUTO: 13.7 K/UL (ref 4–11)
WBC # BLD AUTO: 14 K/UL (ref 4–11)
WBC # BLD AUTO: 14.3 K/UL (ref 4–11)
WBC # BLD AUTO: 14.6 K/UL (ref 4–11)
WBC # BLD AUTO: 14.8 K/UL (ref 4–11)
WBC # BLD AUTO: 15.1 K/UL (ref 4–11)
WBC # BLD AUTO: 15.2 K/UL (ref 4–11)
WBC # BLD AUTO: 15.2 K/UL (ref 4–11)
WBC # BLD AUTO: 15.6 K/UL (ref 4–11)
WBC # BLD AUTO: 17.9 K/UL (ref 4–11)
WBC # BLD AUTO: 4.5 K/UL (ref 4–11)
WBC # BLD AUTO: 5.9 K/UL (ref 4–11)
WBC # BLD AUTO: 6.9 K/UL (ref 4–11)
WBC # BLD AUTO: 8.2 K/UL (ref 4–11)
WBC # BLD AUTO: 8.5 K/UL (ref 4–11)
WBC # BLD AUTO: 8.7 K/UL (ref 4–11)
WBC # BLD AUTO: 9 K/UL (ref 4–11)
WBC # BLD AUTO: 9.3 K/UL (ref 4–11)
WBC #/AREA URNS AUTO: 1 /HPF
WBC #/AREA URNS AUTO: 1 /HPF
WBC #/AREA URNS AUTO: <1 /HPF
YERSINIA DNA SPEC NAA+PROBE: NEGATIVE

## 2018-01-01 PROCEDURE — 99222 1ST HOSP IP/OBS MODERATE 55: CPT | Performed by: INTERNAL MEDICINE

## 2018-01-01 PROCEDURE — 99233 SBSQ HOSP IP/OBS HIGH 50: CPT | Performed by: HOSPITALIST

## 2018-01-01 PROCEDURE — 99232 SBSQ HOSP IP/OBS MODERATE 35: CPT | Performed by: INTERNAL MEDICINE

## 2018-01-01 PROCEDURE — 97597 DBRDMT OPN WND 1ST 20 CM/<: CPT | Performed by: NURSE PRACTITIONER

## 2018-01-01 PROCEDURE — 99233 SBSQ HOSP IP/OBS HIGH 50: CPT | Performed by: OTHER

## 2018-01-01 PROCEDURE — 99231 SBSQ HOSP IP/OBS SF/LOW 25: CPT | Performed by: REGISTERED NURSE

## 2018-01-01 PROCEDURE — 99495 TRANSJ CARE MGMT MOD F2F 14D: CPT | Performed by: FAMILY MEDICINE

## 2018-01-01 PROCEDURE — 99239 HOSP IP/OBS DSCHRG MGMT >30: CPT | Performed by: HOSPITALIST

## 2018-01-01 PROCEDURE — 71045 X-RAY EXAM CHEST 1 VIEW: CPT | Performed by: INTERNAL MEDICINE

## 2018-01-01 PROCEDURE — 99233 SBSQ HOSP IP/OBS HIGH 50: CPT | Performed by: REGISTERED NURSE

## 2018-01-01 PROCEDURE — 99232 SBSQ HOSP IP/OBS MODERATE 35: CPT | Performed by: OTHER

## 2018-01-01 PROCEDURE — 80053 COMPREHEN METABOLIC PANEL: CPT

## 2018-01-01 PROCEDURE — 71260 CT THORAX DX C+: CPT | Performed by: HOSPITALIST

## 2018-01-01 PROCEDURE — 99497 ADVNCD CARE PLAN 30 MIN: CPT | Performed by: HOSPITALIST

## 2018-01-01 PROCEDURE — CB12VZZ PLANAR NUCLEAR MEDICINE IMAGING OF LUNGS AND BRONCHI USING XENON 133 (XE-133): ICD-10-PCS | Performed by: RADIOLOGY

## 2018-01-01 PROCEDURE — 71046 X-RAY EXAM CHEST 2 VIEWS: CPT | Performed by: INTERNAL MEDICINE

## 2018-01-01 PROCEDURE — 71046 X-RAY EXAM CHEST 2 VIEWS: CPT | Performed by: HOSPITALIST

## 2018-01-01 PROCEDURE — 71250 CT THORAX DX C-: CPT | Performed by: PHYSICIAN ASSISTANT

## 2018-01-01 PROCEDURE — 74230 X-RAY XM SWLNG FUNCJ C+: CPT | Performed by: HOSPITALIST

## 2018-01-01 PROCEDURE — 99310 SBSQ NF CARE HIGH MDM 45: CPT | Performed by: NURSE PRACTITIONER

## 2018-01-01 PROCEDURE — 97597 DBRDMT OPN WND 1ST 20 CM/<: CPT

## 2018-01-01 PROCEDURE — 99232 SBSQ HOSP IP/OBS MODERATE 35: CPT | Performed by: HOSPITALIST

## 2018-01-01 PROCEDURE — 99232 SBSQ HOSP IP/OBS MODERATE 35: CPT | Performed by: REGISTERED NURSE

## 2018-01-01 PROCEDURE — 99308 SBSQ NF CARE LOW MDM 20: CPT | Performed by: NURSE PRACTITIONER

## 2018-01-01 PROCEDURE — 96160 PT-FOCUSED HLTH RISK ASSMT: CPT | Performed by: FAMILY MEDICINE

## 2018-01-01 PROCEDURE — 99221 1ST HOSP IP/OBS SF/LOW 40: CPT | Performed by: REGISTERED NURSE

## 2018-01-01 PROCEDURE — 71045 X-RAY EXAM CHEST 1 VIEW: CPT | Performed by: HOSPITALIST

## 2018-01-01 PROCEDURE — 99219 INITIAL OBSERVATION CARE,LEVL II: CPT | Performed by: HOSPITALIST

## 2018-01-01 PROCEDURE — 99214 OFFICE O/P EST MOD 30 MIN: CPT | Performed by: NURSE PRACTITIONER

## 2018-01-01 PROCEDURE — 86140 C-REACTIVE PROTEIN: CPT

## 2018-01-01 PROCEDURE — 99212 OFFICE O/P EST SF 10 MIN: CPT

## 2018-01-01 PROCEDURE — 99497 ADVNCD CARE PLAN 30 MIN: CPT | Performed by: REGISTERED NURSE

## 2018-01-01 PROCEDURE — 99212 OFFICE O/P EST SF 10 MIN: CPT | Performed by: NURSE PRACTITIONER

## 2018-01-01 PROCEDURE — 85025 COMPLETE CBC W/AUTO DIFF WBC: CPT

## 2018-01-01 PROCEDURE — 99226 SUBSEQUENT OBSERVATION CARE: CPT | Performed by: HOSPITALIST

## 2018-01-01 PROCEDURE — 99223 1ST HOSP IP/OBS HIGH 75: CPT | Performed by: REGISTERED NURSE

## 2018-01-01 PROCEDURE — 73650 X-RAY EXAM OF HEEL: CPT | Performed by: NURSE PRACTITIONER

## 2018-01-01 PROCEDURE — 90792 PSYCH DIAG EVAL W/MED SRVCS: CPT | Performed by: OTHER

## 2018-01-01 PROCEDURE — 85652 RBC SED RATE AUTOMATED: CPT

## 2018-01-01 PROCEDURE — 3E0F7GC INTRODUCTION OF OTHER THERAPEUTIC SUBSTANCE INTO RESPIRATORY TRACT, VIA NATURAL OR ARTIFICIAL OPENING: ICD-10-PCS | Performed by: HOSPITALIST

## 2018-01-01 PROCEDURE — 71045 X-RAY EXAM CHEST 1 VIEW: CPT | Performed by: EMERGENCY MEDICINE

## 2018-01-01 PROCEDURE — 78582 LUNG VENTILAT&PERFUS IMAGING: CPT | Performed by: HOSPITALIST

## 2018-01-01 PROCEDURE — 99233 SBSQ HOSP IP/OBS HIGH 50: CPT | Performed by: INTERNAL MEDICINE

## 2018-01-01 PROCEDURE — 74160 CT ABDOMEN W/CONTRAST: CPT | Performed by: HOSPITALIST

## 2018-01-01 PROCEDURE — 99211 OFF/OP EST MAY X REQ PHY/QHP: CPT | Performed by: NURSE PRACTITIONER

## 2018-01-01 PROCEDURE — G0439 PPPS, SUBSEQ VISIT: HCPCS | Performed by: FAMILY MEDICINE

## 2018-01-01 PROCEDURE — 99223 1ST HOSP IP/OBS HIGH 75: CPT | Performed by: HOSPITALIST

## 2018-01-01 PROCEDURE — 74177 CT ABD & PELVIS W/CONTRAST: CPT | Performed by: HOSPITALIST

## 2018-01-01 PROCEDURE — 36415 COLL VENOUS BLD VENIPUNCTURE: CPT

## 2018-01-01 PROCEDURE — 76700 US EXAM ABDOM COMPLETE: CPT | Performed by: HOSPITALIST

## 2018-01-01 RX ORDER — ONDANSETRON 2 MG/ML
4 INJECTION INTRAMUSCULAR; INTRAVENOUS EVERY 6 HOURS PRN
Status: DISCONTINUED | OUTPATIENT
Start: 2018-01-01 | End: 2018-01-01

## 2018-01-01 RX ORDER — ACETAMINOPHEN 325 MG/1
650 TABLET ORAL EVERY 6 HOURS PRN
Status: DISCONTINUED | OUTPATIENT
Start: 2018-01-01 | End: 2018-01-01

## 2018-01-01 RX ORDER — MORPHINE SULFATE 4 MG/ML
4 INJECTION, SOLUTION INTRAMUSCULAR; INTRAVENOUS EVERY 4 HOURS PRN
Status: DISCONTINUED | OUTPATIENT
Start: 2018-01-01 | End: 2018-01-01

## 2018-01-01 RX ORDER — SODIUM CHLORIDE, SODIUM LACTATE, POTASSIUM CHLORIDE, CALCIUM CHLORIDE 600; 310; 30; 20 MG/100ML; MG/100ML; MG/100ML; MG/100ML
INJECTION, SOLUTION INTRAVENOUS CONTINUOUS
Status: DISCONTINUED | OUTPATIENT
Start: 2018-01-01 | End: 2018-01-01

## 2018-01-01 RX ORDER — SODIUM PHOSPHATE, DIBASIC AND SODIUM PHOSPHATE, MONOBASIC 7; 19 G/133ML; G/133ML
1 ENEMA RECTAL ONCE AS NEEDED
Status: DISCONTINUED | OUTPATIENT
Start: 2018-01-01 | End: 2018-01-01

## 2018-01-01 RX ORDER — POLYETHYLENE GLYCOL 3350 17 G/17G
17 POWDER, FOR SOLUTION ORAL DAILY PRN
Status: DISCONTINUED | OUTPATIENT
Start: 2018-01-01 | End: 2018-01-01

## 2018-01-01 RX ORDER — KETOROLAC TROMETHAMINE 15 MG/ML
INJECTION, SOLUTION INTRAMUSCULAR; INTRAVENOUS
Status: COMPLETED
Start: 2018-01-01 | End: 2018-01-01

## 2018-01-01 RX ORDER — MAGNESIUM SULFATE HEPTAHYDRATE 40 MG/ML
2 INJECTION, SOLUTION INTRAVENOUS ONCE
Status: COMPLETED | OUTPATIENT
Start: 2018-01-01 | End: 2018-01-01

## 2018-01-01 RX ORDER — DIPHENHYDRAMINE HCL 25 MG
25 CAPSULE ORAL EVERY 6 HOURS PRN
Status: DISCONTINUED | OUTPATIENT
Start: 2018-01-01 | End: 2018-01-01

## 2018-01-01 RX ORDER — MEMANTINE HYDROCHLORIDE 10 MG/1
10 TABLET ORAL 2 TIMES DAILY
Status: DISCONTINUED | OUTPATIENT
Start: 2018-01-01 | End: 2018-01-01

## 2018-01-01 RX ORDER — HYDROCODONE BITARTRATE AND ACETAMINOPHEN 7.5; 325 MG/1; MG/1
1 TABLET ORAL EVERY 6 HOURS PRN
Qty: 120 TABLET | Refills: 0 | Status: ON HOLD | OUTPATIENT
Start: 2018-01-01 | End: 2018-01-01

## 2018-01-01 RX ORDER — MAGNESIUM OXIDE 400 MG (241.3 MG MAGNESIUM) TABLET
400 TABLET ONCE
Status: COMPLETED | OUTPATIENT
Start: 2018-01-01 | End: 2018-01-01

## 2018-01-01 RX ORDER — SODIUM CHLORIDE 9 MG/ML
INJECTION, SOLUTION INTRAVENOUS
Status: COMPLETED
Start: 2018-01-01 | End: 2018-01-01

## 2018-01-01 RX ORDER — IPRATROPIUM BROMIDE AND ALBUTEROL SULFATE 2.5; .5 MG/3ML; MG/3ML
3 SOLUTION RESPIRATORY (INHALATION)
Status: DISCONTINUED | OUTPATIENT
Start: 2018-01-01 | End: 2018-01-01

## 2018-01-01 RX ORDER — LORAZEPAM 0.5 MG/1
0.25 TABLET ORAL EVERY 6 HOURS PRN
Qty: 30 TABLET | Refills: 0 | Status: SHIPPED | OUTPATIENT
Start: 2018-01-01

## 2018-01-01 RX ORDER — SODIUM CHLORIDE 0.9 % (FLUSH) 0.9 %
3 SYRINGE (ML) INJECTION AS NEEDED
Status: DISCONTINUED | OUTPATIENT
Start: 2018-01-01 | End: 2018-01-01

## 2018-01-01 RX ORDER — SERTRALINE HYDROCHLORIDE 100 MG/1
100 TABLET, FILM COATED ORAL NIGHTLY
Qty: 30 TABLET | Refills: 0 | Status: SHIPPED | OUTPATIENT
Start: 2018-01-01

## 2018-01-01 RX ORDER — IPRATROPIUM BROMIDE AND ALBUTEROL SULFATE 2.5; .5 MG/3ML; MG/3ML
3 SOLUTION RESPIRATORY (INHALATION) EVERY 6 HOURS PRN
Status: DISCONTINUED | OUTPATIENT
Start: 2018-01-01 | End: 2018-01-01

## 2018-01-01 RX ORDER — METOCLOPRAMIDE HYDROCHLORIDE 5 MG/ML
10 INJECTION INTRAMUSCULAR; INTRAVENOUS EVERY 6 HOURS PRN
Status: DISCONTINUED | OUTPATIENT
Start: 2018-01-01 | End: 2018-01-01

## 2018-01-01 RX ORDER — POTASSIUM CHLORIDE 20 MEQ/1
40 TABLET, EXTENDED RELEASE ORAL EVERY 4 HOURS
Status: COMPLETED | OUTPATIENT
Start: 2018-01-01 | End: 2018-01-01

## 2018-01-01 RX ORDER — PANTOPRAZOLE SODIUM 40 MG/1
40 TABLET, DELAYED RELEASE ORAL
Status: DISCONTINUED | OUTPATIENT
Start: 2018-01-01 | End: 2018-01-01

## 2018-01-01 RX ORDER — DIPHENHYDRAMINE HCL 25 MG
25 CAPSULE ORAL EVERY 6 HOURS PRN
Qty: 30 CAPSULE | Refills: 0 | Status: SHIPPED | OUTPATIENT
Start: 2018-01-01

## 2018-01-01 RX ORDER — IPRATROPIUM BROMIDE AND ALBUTEROL SULFATE 2.5; .5 MG/3ML; MG/3ML
3 SOLUTION RESPIRATORY (INHALATION) ONCE
Status: COMPLETED | OUTPATIENT
Start: 2018-01-01 | End: 2018-01-01

## 2018-01-01 RX ORDER — OXYBUTYNIN CHLORIDE 5 MG/1
5 TABLET, EXTENDED RELEASE ORAL DAILY
Status: DISCONTINUED | OUTPATIENT
Start: 2018-01-01 | End: 2018-01-01

## 2018-01-01 RX ORDER — METOCLOPRAMIDE HYDROCHLORIDE 5 MG/ML
10 INJECTION INTRAMUSCULAR; INTRAVENOUS EVERY 8 HOURS PRN
Status: DISCONTINUED | OUTPATIENT
Start: 2018-01-01 | End: 2018-01-01

## 2018-01-01 RX ORDER — GUAIFENESIN 100 MG/5ML
100 SOLUTION ORAL EVERY 4 HOURS PRN
Qty: 250 ML | Refills: 0 | Status: SHIPPED | OUTPATIENT
Start: 2018-01-01

## 2018-01-01 RX ORDER — FUROSEMIDE 10 MG/ML
20 INJECTION INTRAMUSCULAR; INTRAVENOUS ONCE
Status: COMPLETED | OUTPATIENT
Start: 2018-01-01 | End: 2018-01-01

## 2018-01-01 RX ORDER — LORAZEPAM 0.5 MG/1
0.5 TABLET ORAL EVERY 8 HOURS
Status: DISCONTINUED | OUTPATIENT
Start: 2018-01-01 | End: 2018-01-01

## 2018-01-01 RX ORDER — HYDROCODONE BITARTRATE AND ACETAMINOPHEN 7.5; 325 MG/1; MG/1
1 TABLET ORAL EVERY 6 HOURS PRN
Qty: 10 TABLET | Refills: 0 | Status: ON HOLD | OUTPATIENT
Start: 2018-01-01 | End: 2018-01-01

## 2018-01-01 RX ORDER — HYDROCODONE BITARTRATE AND ACETAMINOPHEN 7.5; 325 MG/1; MG/1
1 TABLET ORAL EVERY 6 HOURS PRN
Qty: 30 TABLET | Refills: 0 | Status: SHIPPED | OUTPATIENT
Start: 2018-01-01 | End: 2018-01-01

## 2018-01-01 RX ORDER — ALBUTEROL SULFATE 90 UG/1
2 AEROSOL, METERED RESPIRATORY (INHALATION) EVERY 6 HOURS PRN
Status: DISCONTINUED | OUTPATIENT
Start: 2018-01-01 | End: 2018-01-01

## 2018-01-01 RX ORDER — PANTOPRAZOLE SODIUM 20 MG/1
40 TABLET, DELAYED RELEASE ORAL
Status: DISCONTINUED | OUTPATIENT
Start: 2018-01-01 | End: 2018-01-01

## 2018-01-01 RX ORDER — ACETYLCYSTEINE 200 MG/ML
2 SOLUTION ORAL; RESPIRATORY (INHALATION) EVERY 4 HOURS
Status: DISCONTINUED | OUTPATIENT
Start: 2018-01-01 | End: 2018-01-01

## 2018-01-01 RX ORDER — DONEPEZIL HYDROCHLORIDE 10 MG/1
10 TABLET, FILM COATED ORAL NIGHTLY
Status: DISCONTINUED | OUTPATIENT
Start: 2018-01-01 | End: 2018-01-01

## 2018-01-01 RX ORDER — AMOXICILLIN AND CLAVULANATE POTASSIUM 500; 125 MG/1; MG/1
1 TABLET, FILM COATED ORAL 3 TIMES DAILY
Refills: 0 | Status: SHIPPED | COMMUNITY
Start: 2018-01-01 | End: 2018-01-01

## 2018-01-01 RX ORDER — LORAZEPAM 0.5 MG/1
0.25 TABLET ORAL EVERY 6 HOURS PRN
Status: DISCONTINUED | OUTPATIENT
Start: 2018-01-01 | End: 2018-01-01

## 2018-01-01 RX ORDER — MAGNESIUM OXIDE 400 MG (241.3 MG MAGNESIUM) TABLET
800 TABLET ONCE
Status: COMPLETED | OUTPATIENT
Start: 2018-01-01 | End: 2018-01-01

## 2018-01-01 RX ORDER — HYDROCODONE BITARTRATE AND ACETAMINOPHEN 7.5; 325 MG/1; MG/1
1 TABLET ORAL EVERY 6 HOURS PRN
Qty: 270 TABLET | Refills: 0 | Status: SHIPPED | OUTPATIENT
Start: 2018-01-01 | End: 2018-01-01

## 2018-01-01 RX ORDER — GUAIFENESIN 100 MG/5ML
100 SOLUTION ORAL EVERY 4 HOURS PRN
Status: DISCONTINUED | OUTPATIENT
Start: 2018-01-01 | End: 2018-01-01

## 2018-01-01 RX ORDER — HYDROCODONE BITARTRATE AND ACETAMINOPHEN 7.5; 325 MG/1; MG/1
1 TABLET ORAL EVERY 6 HOURS PRN
Qty: 150 TABLET | Refills: 0 | OUTPATIENT
Start: 2018-01-01 | End: 2018-01-01

## 2018-01-01 RX ORDER — POTASSIUM CHLORIDE 14.9 MG/ML
20 INJECTION INTRAVENOUS ONCE
Status: COMPLETED | OUTPATIENT
Start: 2018-01-01 | End: 2018-01-01

## 2018-01-01 RX ORDER — LORAZEPAM 0.5 MG/1
0.5 TABLET ORAL EVERY 4 HOURS PRN
Status: DISCONTINUED | OUTPATIENT
Start: 2018-01-01 | End: 2018-01-01

## 2018-01-01 RX ORDER — MAGNESIUM HYDROXIDE/ALUMINUM HYDROXICE/SIMETHICONE 120; 1200; 1200 MG/30ML; MG/30ML; MG/30ML
30 SUSPENSION ORAL 4 TIMES DAILY PRN
Status: DISCONTINUED | OUTPATIENT
Start: 2018-01-01 | End: 2018-01-01

## 2018-01-01 RX ORDER — SERTRALINE HYDROCHLORIDE 100 MG/1
300 TABLET, FILM COATED ORAL DAILY
Status: DISCONTINUED | OUTPATIENT
Start: 2018-01-01 | End: 2018-01-01

## 2018-01-01 RX ORDER — OLANZAPINE 2.5 MG/1
2.5 TABLET ORAL NIGHTLY
Status: DISCONTINUED | OUTPATIENT
Start: 2018-01-01 | End: 2018-01-01

## 2018-01-01 RX ORDER — MULTIPLE VITAMINS W/ MINERALS TAB 9MG-400MCG
1 TAB ORAL DAILY
Status: DISCONTINUED | OUTPATIENT
Start: 2018-01-01 | End: 2018-01-01

## 2018-01-01 RX ORDER — DILTIAZEM HYDROCHLORIDE 180 MG/1
180 CAPSULE, COATED, EXTENDED RELEASE ORAL DAILY
Qty: 30 CAPSULE | Refills: 0 | Status: SHIPPED | OUTPATIENT
Start: 2018-01-01

## 2018-01-01 RX ORDER — LORAZEPAM 0.5 MG/1
0.25 TABLET ORAL 4 TIMES DAILY
Qty: 120 TABLET | Refills: 0 | Status: SHIPPED | OUTPATIENT
Start: 2018-01-01

## 2018-01-01 RX ORDER — ARIPIPRAZOLE 15 MG/1
40 TABLET ORAL EVERY 4 HOURS
Status: COMPLETED | OUTPATIENT
Start: 2018-01-01 | End: 2018-01-01

## 2018-01-01 RX ORDER — TOLTERODINE 2 MG/1
CAPSULE, EXTENDED RELEASE ORAL
Qty: 90 CAPSULE | Refills: 0 | Status: SHIPPED | OUTPATIENT
Start: 2018-01-01

## 2018-01-01 RX ORDER — POTASSIUM CHLORIDE 20 MEQ/1
40 TABLET, EXTENDED RELEASE ORAL ONCE
Status: COMPLETED | OUTPATIENT
Start: 2018-01-01 | End: 2018-01-01

## 2018-01-01 RX ORDER — DICYCLOMINE HYDROCHLORIDE 10 MG/1
10 CAPSULE ORAL 2 TIMES DAILY
Status: DISCONTINUED | OUTPATIENT
Start: 2018-01-01 | End: 2018-01-01

## 2018-01-01 RX ORDER — TOLTERODINE 2 MG/1
CAPSULE, EXTENDED RELEASE ORAL
Qty: 90 CAPSULE | Refills: 0 | Status: SHIPPED | OUTPATIENT
Start: 2018-01-01 | End: 2018-01-01

## 2018-01-01 RX ORDER — BUPROPION HYDROCHLORIDE 150 MG/1
150 TABLET, EXTENDED RELEASE ORAL DAILY
Status: DISCONTINUED | OUTPATIENT
Start: 2018-01-01 | End: 2018-01-01

## 2018-01-01 RX ORDER — BUTALBITAL, ACETAMINOPHEN AND CAFFEINE 50; 325; 40 MG/1; MG/1; MG/1
1 TABLET ORAL EVERY 6 HOURS PRN
Qty: 20 TABLET | Refills: 1 | Status: SHIPPED
Start: 2018-01-01

## 2018-01-01 RX ORDER — TOLTERODINE 2 MG/1
2 CAPSULE, EXTENDED RELEASE ORAL DAILY
Qty: 90 CAPSULE | Refills: 0 | Status: SHIPPED | OUTPATIENT
Start: 2018-01-01 | End: 2018-01-01

## 2018-01-01 RX ORDER — KETOROLAC TROMETHAMINE 15 MG/ML
15 INJECTION, SOLUTION INTRAMUSCULAR; INTRAVENOUS EVERY 6 HOURS PRN
Status: DISPENSED | OUTPATIENT
Start: 2018-01-01 | End: 2018-01-01

## 2018-01-01 RX ORDER — METHYLPREDNISOLONE SODIUM SUCCINATE 125 MG/2ML
125 INJECTION, POWDER, LYOPHILIZED, FOR SOLUTION INTRAMUSCULAR; INTRAVENOUS ONCE
Status: COMPLETED | OUTPATIENT
Start: 2018-01-01 | End: 2018-01-01

## 2018-01-01 RX ORDER — MORPHINE SULFATE 20 MG/ML
6 SOLUTION ORAL EVERY 4 HOURS PRN
Qty: 30 ML | Refills: 0 | Status: SHIPPED | OUTPATIENT
Start: 2018-01-01 | End: 2018-10-07

## 2018-01-01 RX ORDER — SODIUM CHLORIDE 9 MG/ML
INJECTION, SOLUTION INTRAVENOUS CONTINUOUS
Status: DISCONTINUED | OUTPATIENT
Start: 2018-01-01 | End: 2018-01-01

## 2018-01-01 RX ORDER — HYDROCODONE BITARTRATE AND ACETAMINOPHEN 7.5; 325 MG/1; MG/1
1 TABLET ORAL EVERY 6 HOURS PRN
COMMUNITY
End: 2018-01-01

## 2018-01-01 RX ORDER — DILTIAZEM HYDROCHLORIDE 120 MG/1
120 CAPSULE, COATED, EXTENDED RELEASE ORAL DAILY
Status: DISCONTINUED | OUTPATIENT
Start: 2018-01-01 | End: 2018-01-01

## 2018-01-01 RX ORDER — MORPHINE SULFATE 20 MG/ML
5 SOLUTION ORAL EVERY 4 HOURS PRN
Status: DISCONTINUED | OUTPATIENT
Start: 2018-01-01 | End: 2018-01-01

## 2018-01-01 RX ORDER — DILTIAZEM HYDROCHLORIDE 180 MG/1
180 CAPSULE, COATED, EXTENDED RELEASE ORAL DAILY
Status: DISCONTINUED | OUTPATIENT
Start: 2018-01-01 | End: 2018-01-01

## 2018-01-01 RX ORDER — HEPARIN SODIUM 5000 [USP'U]/ML
5000 INJECTION, SOLUTION INTRAVENOUS; SUBCUTANEOUS EVERY 12 HOURS SCHEDULED
Status: DISCONTINUED | OUTPATIENT
Start: 2018-01-01 | End: 2018-01-01

## 2018-01-01 RX ORDER — ARIPIPRAZOLE 15 MG/1
40 TABLET ORAL ONCE
Status: COMPLETED | OUTPATIENT
Start: 2018-01-01 | End: 2018-01-01

## 2018-01-01 RX ORDER — ASPIRIN 81 MG/1
81 TABLET, CHEWABLE ORAL DAILY
Status: DISCONTINUED | OUTPATIENT
Start: 2018-01-01 | End: 2018-01-01

## 2018-01-01 RX ORDER — BISACODYL 10 MG
10 SUPPOSITORY, RECTAL RECTAL
Status: DISCONTINUED | OUTPATIENT
Start: 2018-01-01 | End: 2018-01-01

## 2018-01-01 RX ORDER — POLYETHYLENE GLYCOL 3350 17 G/17G
17 POWDER, FOR SOLUTION ORAL DAILY PRN
Qty: 30 EACH | Refills: 0 | Status: SHIPPED | OUTPATIENT
Start: 2018-01-01

## 2018-01-01 RX ORDER — METRONIDAZOLE 500 MG/100ML
500 INJECTION, SOLUTION INTRAVENOUS EVERY 8 HOURS
Status: DISCONTINUED | OUTPATIENT
Start: 2018-01-01 | End: 2018-01-01

## 2018-01-01 RX ORDER — HYDROCODONE BITARTRATE AND ACETAMINOPHEN 7.5; 325 MG/1; MG/1
1 TABLET ORAL EVERY 6 HOURS PRN
Status: DISCONTINUED | OUTPATIENT
Start: 2018-01-01 | End: 2018-01-01

## 2018-01-01 RX ORDER — LORAZEPAM 0.5 MG/1
0.25 TABLET ORAL 3 TIMES DAILY
Qty: 30 TABLET | Refills: 0 | Status: SHIPPED | OUTPATIENT
Start: 2018-01-01 | End: 2018-01-01

## 2018-01-01 RX ORDER — BISACODYL 10 MG
10 SUPPOSITORY, RECTAL RECTAL
Qty: 12 SUPPOSITORY | Refills: 0 | Status: SHIPPED | OUTPATIENT
Start: 2018-01-01

## 2018-01-01 RX ORDER — LATANOPROST 50 UG/ML
1 SOLUTION/ DROPS OPHTHALMIC NIGHTLY
Status: DISCONTINUED | OUTPATIENT
Start: 2018-01-01 | End: 2018-01-01

## 2018-01-01 RX ORDER — DOCUSATE SODIUM 100 MG/1
100 CAPSULE, LIQUID FILLED ORAL 2 TIMES DAILY
Status: DISCONTINUED | OUTPATIENT
Start: 2018-01-01 | End: 2018-01-01

## 2018-01-01 RX ORDER — MORPHINE SULFATE 2 MG/ML
2 INJECTION, SOLUTION INTRAMUSCULAR; INTRAVENOUS EVERY 4 HOURS PRN
Status: DISCONTINUED | OUTPATIENT
Start: 2018-01-01 | End: 2018-01-01

## 2018-01-01 RX ORDER — BUPROPION HYDROCHLORIDE 150 MG/1
150 TABLET ORAL DAILY
Status: DISCONTINUED | OUTPATIENT
Start: 2018-01-01 | End: 2018-01-01

## 2018-01-01 RX ORDER — PANTOPRAZOLE SODIUM 20 MG/1
20 TABLET, DELAYED RELEASE ORAL 2 TIMES DAILY
Status: DISCONTINUED | OUTPATIENT
Start: 2018-01-01 | End: 2018-01-01

## 2018-01-01 RX ORDER — DICYCLOMINE HYDROCHLORIDE 10 MG/1
10 CAPSULE ORAL 3 TIMES DAILY PRN
Qty: 180 CAPSULE | Refills: 1 | Status: SHIPPED | OUTPATIENT
Start: 2018-01-01

## 2018-01-01 RX ORDER — LORAZEPAM 0.5 MG/1
0.5 TABLET ORAL EVERY 4 HOURS PRN
Qty: 30 TABLET | Refills: 0 | Status: SHIPPED | OUTPATIENT
Start: 2018-01-01 | End: 2018-01-01

## 2018-01-01 RX ORDER — TOLTERODINE 2 MG/1
2 CAPSULE, EXTENDED RELEASE ORAL DAILY
Qty: 30 CAPSULE | Refills: 2 | Status: SHIPPED | OUTPATIENT
Start: 2018-01-01 | End: 2018-01-01

## 2018-01-01 RX ORDER — CALCITONIN SALMON 200 [IU]/.09ML
1 SPRAY, METERED NASAL DAILY
Status: DISCONTINUED | OUTPATIENT
Start: 2018-01-01 | End: 2018-01-01

## 2018-01-01 RX ORDER — HYDROCODONE BITARTRATE AND ACETAMINOPHEN 7.5; 325 MG/1; MG/1
1 TABLET ORAL EVERY 4 HOURS PRN
Status: DISCONTINUED | OUTPATIENT
Start: 2018-01-01 | End: 2018-01-01

## 2018-01-01 RX ORDER — BUTALBITAL, ACETAMINOPHEN AND CAFFEINE 50; 325; 40 MG/1; MG/1; MG/1
1 TABLET ORAL EVERY 6 HOURS PRN
Status: DISCONTINUED | OUTPATIENT
Start: 2018-01-01 | End: 2018-01-01

## 2018-01-01 RX ORDER — ARIPIPRAZOLE 15 MG/1
40 TABLET ORAL EVERY 4 HOURS
Status: DISPENSED | OUTPATIENT
Start: 2018-01-01 | End: 2018-01-01

## 2018-01-01 RX ORDER — LORAZEPAM 0.5 MG/1
0.25 TABLET ORAL 3 TIMES DAILY
Status: DISCONTINUED | OUTPATIENT
Start: 2018-01-01 | End: 2018-01-01

## 2018-01-01 RX ORDER — HYDROCODONE BITARTRATE AND ACETAMINOPHEN 5; 325 MG/1; MG/1
1 TABLET ORAL EVERY 6 HOURS PRN
Status: DISCONTINUED | OUTPATIENT
Start: 2018-01-01 | End: 2018-01-01

## 2018-01-01 RX ORDER — LORAZEPAM 0.5 MG/1
0.5 TABLET ORAL ONCE
Status: COMPLETED | OUTPATIENT
Start: 2018-01-01 | End: 2018-01-01

## 2018-01-01 RX ORDER — DIGOXIN 125 MCG
250 TABLET ORAL ONCE
Status: COMPLETED | OUTPATIENT
Start: 2018-01-01 | End: 2018-01-01

## 2018-01-01 RX ORDER — FLUCONAZOLE 2 MG/ML
200 INJECTION, SOLUTION INTRAVENOUS EVERY 24 HOURS
Status: DISCONTINUED | OUTPATIENT
Start: 2018-01-01 | End: 2018-01-01

## 2018-01-01 RX ORDER — MAGNESIUM SULFATE 1 G/100ML
1 INJECTION INTRAVENOUS ONCE
Status: COMPLETED | OUTPATIENT
Start: 2018-01-01 | End: 2018-01-01

## 2018-01-01 RX ORDER — LORAZEPAM 0.5 MG/1
0.25 TABLET ORAL 4 TIMES DAILY
Status: DISCONTINUED | OUTPATIENT
Start: 2018-01-01 | End: 2018-01-01

## 2018-01-01 RX ORDER — SERTRALINE HYDROCHLORIDE 100 MG/1
100 TABLET, FILM COATED ORAL NIGHTLY
Status: DISCONTINUED | OUTPATIENT
Start: 2018-01-01 | End: 2018-01-01

## 2018-01-01 RX ORDER — ENOXAPARIN SODIUM 100 MG/ML
40 INJECTION SUBCUTANEOUS EVERY 24 HOURS
Status: DISCONTINUED | OUTPATIENT
Start: 2018-01-01 | End: 2018-01-01

## 2018-01-01 RX ORDER — OLANZAPINE 2.5 MG/1
2.5 TABLET ORAL NIGHTLY
Qty: 30 TABLET | Refills: 0 | Status: SHIPPED | OUTPATIENT
Start: 2018-01-01

## 2018-01-01 RX ORDER — ACETAMINOPHEN 500 MG
500 TABLET ORAL EVERY 6 HOURS PRN
Status: DISCONTINUED | OUTPATIENT
Start: 2018-01-01 | End: 2018-01-01

## 2018-01-04 NOTE — TELEPHONE ENCOUNTER
Pt's  stated he had not been called and updated  Called HH-spoke to [facility] Pending INS and they will need LOV faxed-sent  Pts  informed

## 2018-01-05 NOTE — TELEPHONE ENCOUNTER
From: Simran Calvillo  To: Nani Huynh DO  Sent: 1/3/2018 11:53 AM CST  Subject: Other    My wife, Gudelia Pace is experiencing urinary incontinence and has for several years. A few years ago Dr. Merlyn Tatum said corrective surgery is not an option.  A rela

## 2018-01-09 NOTE — TELEPHONE ENCOUNTER
Pt's spouse asking if medication for urinary incontinence can be sent to [facility] mail order pharmacy. Noted Tolterodine prescription entered 1/6/18 and rightfaxed to [facility]. Prescription sent to [facility] mail order pharmacy per protocol.

## 2018-01-10 NOTE — TELEPHONE ENCOUNTER
Suhas called in stating that [facility], and PT. Suhas is requesting an order for OT as well.

## 2018-01-15 NOTE — TELEPHONE ENCOUNTER
Patient is eligible for a 2018 Annual Medicare Health Assessment. Discussed in detail w/patient's . Appt scheduled for 3/27/18.

## 2018-01-17 NOTE — TELEPHONE ENCOUNTER
Per Suhas/RN, pt and  is requesting pt to go back to [facility] due to pt feels better and stronger there. The PT and OT from home health has been limited in their Home visit due to pt insurance.

## 2018-01-19 NOTE — TELEPHONE ENCOUNTER
Contacted Suhas RN informed of Dr. Rosio Barron message below. Suhas states he contacted [employer] on behalf of patient and was told All they need referral and order to be faxed as well as home health progress notes.  Dr. Rosio Barron can you please place referral and or

## 2018-01-20 NOTE — TELEPHONE ENCOUNTER
No this is incorrect. Already has home health to my knowledge - she wants to go to outpatient physical therapy at [facility]?

## 2018-01-22 NOTE — TELEPHONE ENCOUNTER
Dr. Miriam Rich,   spoke with [facility] home health nurse. Pt would like to be readmitted to [address] [place] [address] to help with OT and PT. See pending referral/order and advise on DX.  Thanks

## 2018-01-23 NOTE — TELEPHONE ENCOUNTER
From: Celso Frankel  To: Mary Lou Ram DO  Sent: 1/22/2018 7:41 PM CST  Subject: Other    Cindy has taken seven daily doses of TOLTERODINE. Results are really noticeable.  She has extremely few bladder emergencies during waking hours and this morning s

## 2018-01-29 NOTE — TELEPHONE ENCOUNTER
Dr. Natalia Nguyen: just a courtesy incoming call from Ally Martin. Ally Villarreal will be extending another week for patient;  Patient spouse cancelled a few appts due to conflict in schedule. Therefore, she will be extending for another week. No changes in visits.   Jus

## 2018-01-31 NOTE — TELEPHONE ENCOUNTER
Dr Tamela Sheridan needs to know where the patient is coming from. Is she inpatient or is she home?      Thank you, Lito

## 2018-02-05 PROBLEM — R53.1 WEAKNESS GENERALIZED: Status: ACTIVE | Noted: 2018-01-01

## 2018-02-05 NOTE — CM/SW NOTE
Met with patient and patients . Per patient's  they have been working with a pre-auth for her to re-admit to [address] from [employer] notified of patients admit to ER.   He is checking for auth on his end and will contact ED

## 2018-02-05 NOTE — TELEPHONE ENCOUNTER
Per Suhas he was just told by the Occupation therapist that pt went to [facility]. Per the  pt was non coherent. Per Suhas this is a doctor JING.

## 2018-02-06 NOTE — PLAN OF CARE
Problem: Patient/Family Goals  Goal: Patient/Family Long Term Goal  Patient's Long Term Goal: to go to [facility]    Interventions:  - See additional Care Plan goals for specific interventions   Outcome: Progressing  Patient was denied a bed from t pressure and fluid volume within ordered parameters to optimize cerebral perfusion and minimize risk of hemorrhage  - Monitor temperature, glucose, and sodium.  Initiate appropriate interventions as ordered  Outcome: Not Progressing  Patient's baseline is a

## 2018-02-06 NOTE — PLAN OF CARE
NEUROLOGICAL - ADULT    • Achieves stable or improved neurological status Adequate for Discharge        Patient Centered Care    • Patient preferences are identified and integrated in the patient's plan of care Adequate for Discharge        Patient/Family

## 2018-02-06 NOTE — PAYOR COMM NOTE
OBSV STATUS    --------------  ADMISSION REVIEW     Payor: [facility]IC[facility]  Subscriber #:  [id number]  Authorization Number: N/A    Admit date: N/A  Admit time: N/A       Admitting Physician: Anisha Levy MD  Attending Physician:  Monserrat Barksdale fracture 02/ 2011    from fall        History reviewed.  Past Surgical History:  2009: CARPAL TUNNEL RELEASE      Comment: justino WEBB CTS surgery  2009: CATARACT EXTRACTION  1-: COLONOSCOPY & POLYPECTOMY  No date: FRACTURE SURGERY      Comment: LEFT L time   DICYCLOMINE HCL 10 MG Oral Cap TAKE 1 CAPSULE TWICE DAILY Disp: 180 capsule Rfl: 1 2/4/2018 at Unknown time   NEXIUM 24HR 20 MG Oral Tab EC Take 20 mg by mouth 2 (two) times daily.  Disp:  Rfl:  2/4/2018 at Unknown time   Spacer/Aero-Holding Laisha Hines noted.    Physical Exam     ED Triage Vitals  BP: 129/67 [02/05/18 1601]  Pulse: 85 [02/05/18 1601]  Resp: 20 [02/05/18 1601]  Temp: 97.9 °F (36.6 °C) [02/05/18 1620]  Temp src: Oral [02/05/18 1620]  SpO2: 90 % [02/05/18 1601]  O2 Device: Nasal cannula [02 ------                     CBC W/ DIFFERENTIAL[[id number]]          Abnormal            Final result                 Please view results for these tests on the individual orders.    RAINBOW DRAW BLUE   RAINBOW DRAW LAVENDER   RAINBOW DRAW DARK GREEN   Piedad. Yanna Shrestha [address] [facility]) tab 10 mg (not administered)   Pantoprazole Sodium (PROTONIX) EC tab 20 mg (not administered)   QUEtiapine Fumarate (SEROQUEL) tab 150 mg (not administered)   Sertraline HCl (ZOLOFT) tab 300 mg (not administered)   Oxybutynin Chloride ER (DITROPA without behavioral disturbance; Acute bronchospasm; Acute cystitis without hematuria; Inability to ambulate due to multiple joints; Memory loss; Acute respiratory failure with hypoxia ([facility] [address]  Apraxia; Cerebrovascular accident (CVA) due to thrombosis of left EMERGENCY DEPARTMENT Attending Leslee Mir MD   [facility] Day # 0 PCP Connie Martin DO     Date:  2/5/2018  Date of Admission:  2/5/2018    History provided by:   Chief Complaint:   Patient presents with:  Altered Mental Status (neurologic)  F Years: 40.00        Types: Cigarettes     Quit date: 5/23/2014  Smokeless tobacco: Never Used                      Alcohol use: No              Allergies/Medications:    Allergies:   Clindamycin             Rash, Shortness of Breath    Comment:Per pt kendrai 11/11/2017   CK 78 09/28/2017   B12 927 (H) 10/02/2017       Xr Chest Ap Portable  (cpt=71045)    Result Date: 2/5/2018  CONCLUSION:  1. No acute findings.          Ekg 12-lead    Result Date: 2/5/2018  ECG Report  Interpretation  -------------------------- Jose Boykin RN      docusate sodium (COLACE) cap 100 mg     Date Action Dose Route User    2/6/2018 0912 Given 100 mg Oral Antoinette Dai RN    2/5/2018 2117 Given 100 mg Oral Jose Boykin RN      Donepezil HCl (ARICEPT) tab 10 mg     Date Action D Jose Boykin RN      QUEtiapine Fumarate (SEROQUEL) tab 150 mg     Date Action Dose Route User    2/5/2018 2117 Given 150 mg Oral Jose Boykin RN          REVIEWER COMMENTS:     OTHER:

## 2018-02-06 NOTE — CM/SW NOTE
SW spoke w/ Detra Shone from [facility] who stated that pt has been denied for rehab coverage. Pt's  stated that pt will not qualify for medicaid or homemaker services, due to attempting to apply in the past and they were denied.  Pt's  stated he will

## 2018-02-06 NOTE — H&P
[address] [facility] Patient Status:  Emergency    1945 MRN [id number]   Location [address] Attending Jude Crespo MD   Hosp Day # 0 PCP DO Phi Pinon osteo cancer- cause of death     Social History:  Smoking status: Former Smoker                                                              Packs/day: 1.20      Years: 40.00        Types: Cigarettes     Quit date: 5/23/2014  Smokeless tobacco: Never Used ALT 31 11/12/2017   PTT 32.9 11/11/2017   INR 1.0 11/14/2017   TSH 2.60 11/12/2017   ESRML 32 (H) 11/14/2017   MG 1.8 11/15/2017   TROP 0.01 11/11/2017   CK 78 09/28/2017   B12 927 (H) 10/02/2017       Xr Chest Ap Portable  (cpt=71045)    Result Date: 2/

## 2018-02-06 NOTE — PHYSICAL THERAPY NOTE
PHYSICAL THERAPY QUICK EVALUATION - INPATIENT    Room Number: 515/515-A  Evaluation Date: 2/6/2018  Presenting Problem: Generalized weakness at home  Physician Order: PT Eval and Treat    Problem List  Principal Problem:    Weakness generalized      Past extremities    Lower extremity strength is limited; patient with difficulty participating in muscle testing.  Patient is grossly weak in bilateral lower extremities 2/5 strength noted     NEUROLOGICAL FINDINGS  Neurological Findings: None through lower extremities. She was assisted to chair with total assist x 2 via squat pivot transfer. Pillows placed to aid patient in positioning further back in chair and at trunk to avoid severe right trunk lean.  Patient was left in bedside chair at end

## 2018-02-06 NOTE — ED PROVIDER NOTES
Patient Seen in: [facility] Emergency Department    History   Patient presents with:  Altered Mental Status (neurologic)  Fatigue (constitutional, neurologic)      HPI    Patient presents to the ED via EMS for generalized weakness.   The  is SR 24 Hr Take 1 capsule (2 mg total) by mouth daily.  Disp: 90 capsule Rfl: 0 2/4/2018 at Unknown time   ipratropium-albuterol 0.5-2.5 (3) MG/3ML Inhalation Solution USE 1 VIAL PER NEBULIZER Q 4 H PRF WHEEZING Disp:  Rfl: 0 Past Week at Unknown time   aspir Fumarate (SEROQUEL) 100 MG Oral Tab 150 mg nightly. Disp:  Rfl:  2/4/2018 at Unknown time   latanoprost (XALATAN) 0.005 % Ophthalmic Solution Place 1 drop into both eyes nightly.    Disp:  Rfl:  2/4/2018 at Unknown time   Sertraline HCl (ZOLOFT) 100 MG Or Normal rate and intact distal pulses. Pulmonary/Chest: Effort normal. No stridor. No respiratory distress. Abdominal: Soft. She exhibits no distension. There is no tenderness. Musculoskeletal: She exhibits no edema or deformity.    Neurological: She ED Medications Administered:   Medications   Normal Saline Flush 0.9 % injection 3 mL (not administered)   Enoxaparin Sodium (LOVENOX) 40 MG/0.4ML injection 40 mg (not administered)   acetaminophen (TYLENOL) tab 650 mg (not administered)   docusate so 94%, Room air, Normal     Monitor Interpretation:   normal sinus rhythm    Differential Diagnosis/ Diagnostic Considerations: Generalized weakness, deconditioning, anemia, electrolyte abnormalities    Medical Record Review: I personally reviewed available walking. Unable to transfer the patient directly to rehab, will admit to observation for PT OT eval, transfer tomorrow. Consult Dr. Sherrell Mojica for admission. Additional verbal instructions were given and discussed with the patient and/or caregiver.     Co

## 2018-02-06 NOTE — CM/SW NOTE
Addend 1036a: SW received MD order for discharge planning. SW spoke w/ pt's , Edwardo Haile [phone] to discuss pt's eventual discharge need. Pt lives at home w/ her  in [place]. Pt lives in a 2 level house w/ 14 stairs.  Pt's  reports darlene

## 2018-02-06 NOTE — PROGRESS NOTES
[facility]  Hospitalist Progress  Note     Conchita Paulson Patient Status:  Observation    1945  74year old [facility] [id number]   Location 515/515-A Attending Azalia Vazquez MD   Hosp Day # 0 PCP Ellyn Ramos,      ASSESSMENT/PLAN Subcutaneous Q24H   • docusate sodium  100 mg Oral BID   • aspirin  81 mg Oral Daily   • Fluticasone Furoate-Vilanterol  1 puff Inhalation Q24H   • BuPROPion HCl ER (SR)  150 mg Oral Daily   • Dicyclomine HCl  10 mg Oral BID   • Donepezil HCl  10 mg Oral N

## 2018-02-07 NOTE — PROGRESS NOTES
TCM OUTREACH    Call made to attempt to reach patient for TCM follow up. Pts  Mike Art answered stating pt is still sleeping. Not available at this time.      (Triage Team if pt returns call please transfer to ext [phone])

## 2018-02-07 NOTE — PROGRESS NOTES
TCM OUTREACH    Call made to attempt to reach patient for TCM follow up. Pt sleeping , requested call back at at later time.      (Triage Team if pt returns call please transfer to ext [phone])

## 2018-02-08 NOTE — PROGRESS NOTES
TCM OUTREACH    Call made to attempt to reach patient for TCM follow up. Pts  Nelly Fitzgerald answered stated pt is sleeping. Not available at this time.     (Triage Team if pt returns call please transfer to ext [phone])

## 2018-02-08 NOTE — TELEPHONE ENCOUNTER
Ding PT Supervisor/Gareth CAZARES called in stating that Pt will need order for a  to go to Pt's home for evaluation. Pt's  is very hesitant to take Pt to nursing facility. [facility] states that insurance approved 2  visits, but they will need the order. Please advise.

## 2018-02-11 NOTE — DISCHARGE SUMMARY
[facility] HOSPITALIST  DISCHARGE SUMMARY     John Kelley Patient Status:  Observation    1945 MRN [id number]   Location [address] [facility] Attending No att. providers found   [facility] Day # 0 PCP Javier Diaz DO     DATE OF ADMISSION: 2018 distress, wheezes, rales, rhonchi. Abd: Soft, NTND, BS normal, no mass, no HSM, no rebound/guarding. Neuro: Normal reflexes, CN. Sensory/motor exams grossly normal deficit. Coordination  and gait normal.   MS: No joint effusions. No peripheral edema. Refills:  0     hydrocodone-acetaminophen 7.5-325 MG Tabs  Commonly known as:  [facility]  Notes to patient:  TAKE AS NEEDED FOR PAIN      Take 1 tablet by mouth every 6 (six) hours as needed for Pain.    Refills:  0     ipratropium-albuterol 0.5-2.5 (3) MG/3ML after discharge from the hospital.

## 2018-02-20 NOTE — TELEPHONE ENCOUNTER
Rescheduled patient for Regular Hospital Follow Up. Patient  phoned and cancelled states patient not feeling well and still in bed.   Rescheduled patient for a Regular Hospital Follow Up  Out of TCM Range  U 2/27

## 2018-02-21 NOTE — PROGRESS NOTES
Several attempts made to reach patient with no return phone call. Patient rescheduled TCM apt out of 14 day TCM window . Closing encounter.  Will f/u w PCP for Reg HFU on 2/27/18

## 2018-02-22 NOTE — TELEPHONE ENCOUNTER
Nacho Lopez occupational therapist called to report that pt had an unwitnessed fall on 2/15/2018. Pt did hit her head and according to  was \"foggy\" that evening but the next day her mental status returned to baseline.   Occupational therapist feels pt's

## 2018-02-22 NOTE — TELEPHONE ENCOUNTER
If swelling or pain not improving then will need to be seen for x ray. Monitor for worsening lightheadedness, mental status changes.

## 2018-02-24 NOTE — TELEPHONE ENCOUNTER
Spoke with the  , cannot give information as he is not HIPAA, advised to call us back on Monday with the patient, so that patient can give phone consent to talk to the  regarding MD recommendations, verbalized understanding.       Left a john

## 2018-02-26 NOTE — TELEPHONE ENCOUNTER
Spoke with [address] OT and she states was calling back from Saturday. Informed Mitra of [employer] message and she voiced understanding. She states that she did follow up with the patient and her  on Saturday as well.  Patient has a f/u schedule

## 2018-02-27 PROBLEM — J43.9 PULMONARY EMPHYSEMA (HCC): Status: ACTIVE | Noted: 2018-01-01

## 2018-02-27 PROBLEM — L97.421 HEEL ULCER, LEFT, LIMITED TO BREAKDOWN OF SKIN (HCC): Status: ACTIVE | Noted: 2018-01-01

## 2018-02-28 NOTE — PROGRESS NOTES
HPI:    Ivan Barron is a 74year old female here today for hospital follow up.    She was discharged from Inpatient hospital, [facility]  to Home   Admission Date: 2/5/18   Discharge Date: 2/6/18  Hospital Discharge Diagnoses (since 1/28/2018) (81 mg total) by mouth daily. LORazepam 0.5 MG Oral Tab Take 1 tablet (0.5 mg total) by mouth every 4 (four) hours as needed for Anxiety. calcitonin, salmon, 200 UNIT/ACT Nasal Solution 1 spray by Nasal route daily.    Budesonide-Formoterol Fumarate 160 and Fracture surgery. She family history includes Cancer in her father. She  reports that she quit smoking about 3 years ago. Her smoking use included Cigarettes. She has a 48.00 pack-year smoking history.  She has never used smokeless tobacco. She rep needed. Decubitus ulcer of right heel, stage 1  medihoney and gauze twice daily. Needs to keep heels off ground. Mild intermittent asthma without complication  Stable.  CPM.    Spinal stenosis of cervicothoracic region  -     PHYSICAL THERAPY EXTERNAL

## 2018-03-08 NOTE — TELEPHONE ENCOUNTER
Eyal Odom from [facility] is stts pt  called st that home health need to keep going per Dr. Silvina Peterson. Liz Servin did a discharge as of 3/01. Please advise. .      Pt  calls every week and stts that pt need home health so the opera

## 2018-03-12 NOTE — TELEPHONE ENCOUNTER
Verbal order on kain Matson, to extend skilled nursing, social service or therapy as long as patient continued to progress

## 2018-03-27 PROBLEM — L98.9 HEEL SORE: Status: RESOLVED | Noted: 2017-04-07 | Resolved: 2018-01-01

## 2018-03-27 PROBLEM — R51.9 BILATERAL HEADACHES: Status: RESOLVED | Noted: 2017-04-26 | Resolved: 2018-01-01

## 2018-03-27 PROBLEM — S79.912A HIP INJURY, LEFT, INITIAL ENCOUNTER: Status: RESOLVED | Noted: 2017-05-14 | Resolved: 2018-01-01
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
## 2018-03-27 PROBLEM — N30.00 ACUTE CYSTITIS WITHOUT HEMATURIA: Status: RESOLVED | Noted: 2017-09-28 | Resolved: 2018-01-01

## 2018-03-27 PROBLEM — G89.4 CHRONIC PAIN SYNDROME: Status: RESOLVED | Noted: 2017-04-07 | Resolved: 2018-01-01
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
## 2018-03-27 PROBLEM — M25.511 ACUTE PAIN OF RIGHT SHOULDER: Status: RESOLVED | Noted: 2017-05-14 | Resolved: 2018-01-01

## 2018-03-27 PROBLEM — J98.01 ACUTE BRONCHOSPASM: Status: RESOLVED | Noted: 2017-09-28 | Resolved: 2018-01-01

## 2018-03-27 PROBLEM — L97.421 HEEL ULCER, LEFT, LIMITED TO BREAKDOWN OF SKIN (HCC): Status: RESOLVED | Noted: 2018-01-01 | Resolved: 2018-01-01

## 2018-03-27 NOTE — PATIENT INSTRUCTIONS
Khari Sheth's SCREENING SCHEDULE   Tests on this list are recommended by your physician but may not be covered, or covered at this frequency, by your insurer. Please check with your insurance carrier before scheduling to verify coverage.    HARISH in their lifetime   • Anyone with a family history    Colorectal Cancer Screening  Covered up to Age 76     Colonoscopy Screen   Covered every 10 years- more often if abnormal Colonoscopy,10 Years due on 01/18/2022 Update [facility] if applicable performed in visit on 10/26/15  -FLU VACC PRSV FREE INC ANTIG    Please get every year    Pneumococcal 13 (Prevnar)  Covered Once after 65 No orders found for this or any previous visit.  Please get once after your 65th birthday    Pneumococcal 23 (Pneumova

## 2018-03-27 NOTE — PROGRESS NOTES
HPI:   Conchita Paulson is a 74year old female who presents for a Medicare Subsequent Annual Wellness visit (Pt already had Initial Annual Wellness).     Patient presents with:  Physical      Annual Physical due on 04/26/2018        Fall/Risk Assessmen Difficulty walking?: Yes   She has problems with Daily Activities based on screening of functional status. Problems with daily activities? : Yes   She has problems with Memory based on screening of functional status.    Memory Problems?: Yes       Lobito than 3 months duration     Tobacco dependence in remission     Atherosclerosis of aorta ([address]     H/O kyphoplasty     Depression, major, in partial remission ([address]     Protein-calorie malnutrition, mild (HCC)     BMI less than 19,adult     Decubitus ulcer Tab Take 1 tablet by mouth every 6 (six) hours as needed for Pain.   ipratropium-albuterol 0.5-2.5 (3) MG/3ML Inhalation Solution USE 1 VIAL PER NEBULIZER Q 4 H PRF WHEEZING   aspirin 81 MG Oral Chew Tab Chew 1 tablet (81 mg total) by mouth daily.    Ekta Desouza (2008); and Fracture surgery. Her family history includes Cancer in her father. SOCIAL HISTORY:   She  reports that she quit smoking about 3 years ago. Her smoking use included Cigarettes. She has a 48.00 pack-year smoking history.  She has never used meeting or place of Anabaptist:  No   Many people I talk to seem to mumble (or don't speak clearly):  Sometimes People get annoyed because I misunderstand what they say:  Yes   I misunderstand what others are saying and make inappropriate responses:  No I charla generalized  -     DME - EXTERNAL   Home PT. She has been unable to qualify for medicare PT to cover. Tobacco dependence in remission  Quit in the past. No longer smoking. Spinal stenosis of cervicothoracic region  -     DME - EXTERNAL   Chronic.  Not months (on 7/27/2018).      Harika Crowell DO, 3/27/2018     General Health     In the past six months, have you lost more than 10 pounds without trying?: 2 - No  Has your appetite been poor?: No  How does the patient maintain a good energy level?: Other reminders to display for this patient. Update Health Maintenance if applicable    Chlamydia  Annually if high risk No results found for: CHLAMYDIA No flowsheet data found.     Screening Mammogram      Mammogram  Annually to 74, then as discussed Gallo Benoit

## 2018-03-29 PROBLEM — L89.603 DECUBITUS ULCER OF HEEL, STAGE 3 (HCC): Status: ACTIVE | Noted: 2018-01-01

## 2018-03-29 PROBLEM — L89.613 DECUBITUS ULCER OF RIGHT HEEL, STAGE 3 (HCC): Status: ACTIVE | Noted: 2018-01-01

## 2018-03-29 NOTE — TELEPHONE ENCOUNTER
Patient is calling and requesting another wound care referral for 3 more visits, advised that will send the message to MD.    To Dr HEMPHILL=referral pended for approval, 3 more visits.

## 2018-03-29 NOTE — TELEPHONE ENCOUNTER
Patient calling and states that wound care clinic advised her to get two things for her heel and requesting  order ;  1. Waffle Heel Elevator  swain= R heel  2.  Waffle Bariatric Cushion  = R heel Item No.#[id number]    Fax number given [phone], states darlene

## 2018-03-29 NOTE — PROGRESS NOTES
Header Image    Progress Note Details  Patient Name: Aashish Alcoecr Patient Number: [id number]  Patient YOB: 1945  Date: 3/29/2018  RN: Rachel Edmond CNA/JAYA/CMA:  Amanda Burgos  Physician / Extender: Lynsey Dates, [address] [facility] Corner: [facility] kyphoplasty x 7    Complaints and Symptoms  This information was obtained from the patient  Patient denies complaints or symptoms related to:   Constitutional Symptoms (General Health): Chills, Fatigue, Fever, Loss of Appetite    Additional Information  Nichelle Ramos budesonide-formoterol  mcg-4.5 mcg/actuation aerosol inhaler inhalation HFA aerosol inhaler inhalation twice daily  calcitonin (salmon) 200 unit/actuation nasal spray nasal spray,non-aerosol nasal once daily  donepezil 10 mg tablet oral tablet oral Wound #2 Right Heel is a Stage 3 Pressure Injury Pressure Ulcer and has received a status of Not Healed. Initial wound encounter measurements are 0.5cm length x 1cm width x 0.1cm depth, with an area of 0.5 sq cm and a volume of 0.05 cubic cm.  No tunneling Patient is reporting 10/10 pain, no palpable bone, wound bed has dried exudate and slough, removed some of the devitalized tissue however patient pain intolerance was a limiting factor.  There is no erythema to the tissue surrounding the wound or other S&S Honey gel - cover with aquacel border   Cover dressing and wrap with joseline. Do not put tape directly on the skin. Offloading  Heel offloading boot  Other: - offload pads x2    Misc / Additional orders  Supplement with a daily multivitamin.    Increase di

## 2018-03-30 NOTE — TELEPHONE ENCOUNTER
Manage Care,   Vendor is [facility] products in [place] phone number is [phone].

## 2018-03-30 NOTE — TELEPHONE ENCOUNTER
Managed care needs to know which DME vendor patient is getting supplies from in order to process referral. Thank you.

## 2018-04-02 NOTE — TELEPHONE ENCOUNTER
Rec'd call from [facility], they are requesting the order for wheelchair to be faxed to [phone]. Order, referral, and facesheet faxed as requesting.

## 2018-04-02 NOTE — TELEPHONE ENCOUNTER
From: Charissa Alves  To: Umang Rothman DO  Sent: 4/2/2018 2:46 PM CDT  Subject: Referral Request    Reema Aydinskip has three more follow up appointments with the wound clinic at the [facility].  They are on Thursdays, 4/5/18, 4/12/18, & 4/19/1

## 2018-04-05 NOTE — PROGRESS NOTES
Header Image    Progress Note Details  Patient Name: Barb Mazariegos Patient Number: [id number]  Patient YOB: 1945  Date: 4/5/2018  RN: Jolie Cameron CNA/JAYA/CMA:  Vasiliy Ahn  Physician / Extender: Domingo Perla, [facility] [address] [facility] Corner: [facility] Wound #2 Right Heel is a Stage 3 Pressure Injury Pressure Ulcer and has received a status of Not Healed. Initial wound encounter measurements are 0.5cm length x 1.2cm width x 0.1cm depth, with an area of 0.6 sq cm and a volume of 0.06 cubic cm.  There is a After obtaining consent, used curette to remove slough and devitalized skin surrounding the wound. There is no S&S of infection noted in the wound. The wound has some granulation and some slough with clear drianage.  Re-educated patient and her  on t Length: (cm) 0.5  Width: (cm) 1.2  Depth: (cm) 0.2  Stage: Stage 3 Pressure Injury      Area: (cm²) 0.6  Percent Debrided: 100  Total Area Debrided: (sq cm) 0.6  Volume: (cm³) 0.12  Devitalized Tissue Debrided:  Callus, Slough  Instrument: Curette  Bleedin

## 2018-04-06 NOTE — TELEPHONE ENCOUNTER
calling for the status of his wife wheel chair.  not on [employer]. He stated that HME is needing additional information. Inform  they called us and per notes below we faxed what they requested.  Inform  he is not on Hipp[employer] can not di

## 2018-04-09 NOTE — TELEPHONE ENCOUNTER
Spoke with Zara Uribe from [facility] and states, \"I have the referral, but I need a hand written or typed order for the wheelchair and office visit notes faxed to Dale Guardado. The referral is the referral, the order is the order.  I need both and the office

## 2018-04-09 NOTE — TELEPHONE ENCOUNTER
Referral approved for [facility]. [phone] who carries this product. Crys Black is not in network with [facility].

## 2018-04-11 NOTE — TELEPHONE ENCOUNTER
Omer Funes is calling [facility]   Need to have a corrected order for wheelchair (for insurance in order for pt to use Home Med services)  Standard Wheelchair Order   Please call to f/u

## 2018-04-12 NOTE — PROGRESS NOTES
Header Image    Progress Note Details  Patient Name: Kesha Victoria Patient Number: [id number]  Patient YOB: 1945  Date: 4/12/2018  RN: Amanda Rose CNA/JAYA/CMA:  Daren Villegas  Physician / Extender: Ramin Blum, [facility] [address] [facility] Corner: [facility] well developed, no acute distress. Height/Length: 63 in (160.02 cm), Weight: 100 lbs (45.45 kgs), BMI: 17.7, Temperature: 97.0 °F (36.11 °C), Pulse: 75 bpm, Respiratory Rate: 17 breaths/min, Blood Pressure: 157/84 mmHg, Pulse Oximetry: 94 %.      Respirator Patient  indicated his frustration on obtaining heel protector from her insurance. Plan    Wound Orders:  Wound #2 Right Heel     Follow-Up Appointments  Return Appointment in 1 week.     Wound Cleansing & Dressings  Clean wound with Normal Saline

## 2018-04-13 NOTE — TELEPHONE ENCOUNTER
Dr. Beth Ayers,   see pending DME order for Wheelchair, last order that was done Home medical does not provide, they will need new DME order.  Thanks

## 2018-04-17 NOTE — TELEPHONE ENCOUNTER
Haley Fried is calling from [facility] f/u on new order for wheelchair please advise   Please call tran at [phone]

## 2018-04-19 NOTE — PROGRESS NOTES
Header Image    Progress Note Details  Patient Name: Barb Mazariegos Patient Number: [id number]  Patient YOB: 1945  Date: 4/19/2018  RN: Jolie Cameron CNA/JAYA/CMA:  Vasiliy Ahn  Physician / Extender: Domingo Perla, [facility] [address] [facility] Corner: [place] small open wound on the posterior heel with granular wound bed. warm, moist, no S&S of infection noted in the tissue surrounding the wound. Wound #2 Right Heel is a Stage 3 Pressure Injury Pressure Ulcer and has received a status of Not Healed.  Subsequ Clean wound with soap and water. Collagen  Bordered foam  Cover dressing and wrap with joseline. Do not put tape directly on the skin. Change dressing two times per week.     Offloading  Pressure relief shoe / inserts / foams - heel protector shoe  Other:

## 2018-04-23 NOTE — TELEPHONE ENCOUNTER
Raymundo Sit from [facility] calling, states they got a call from the patient requesting a seat belt for the standard wheelchair. Raymundo Toscano states they can provide this but there needs to be an updated order. Please advise. no

## 2018-04-24 NOTE — TELEPHONE ENCOUNTER
From: Simran Calvillo  To: Nani Huynh DO  Sent: 4/24/2018 1:49 PM CDT  Subject: Non-Urgent Medical Question    We would like to resume in home physical therapy for Cindy. Purpose of therapy is to get her walking again.  I have contacted [facility]

## 2018-04-24 NOTE — TELEPHONE ENCOUNTER
Dr Dnaiel Summers please see message from patient's , see pending rx for Home PT, diagnosis : generalized weakness, spinal stenosis

## 2018-04-26 NOTE — TELEPHONE ENCOUNTER
Manage care, see below and advise if seatbelt can be adde to order, there is multiple DME orders for this pt.  Thanks

## 2018-04-27 NOTE — TELEPHONE ENCOUNTER
[facility] [phone] states that he would like a copy of the seat belt order faxed to him at [phone].

## 2018-05-01 NOTE — TELEPHONE ENCOUNTER
Pt's spouse dropped off placard form at [facility]. Please complete and mail to  in stamped enveloped (provided). Form in [facility] bin.

## 2018-05-03 NOTE — TELEPHONE ENCOUNTER
The seat beat was approved and faxed over the HME 4/30/18. I will refax it.      Thank you,   Wisam Lang

## 2018-05-03 NOTE — PROGRESS NOTES
Subjective    Chief Complaint  This information was obtained from the patient  The patient is here for a follow up.  05/3/2018 no concerns for today    Allergies  clindamycin (Reaction: rash; SOB), Bactrim (Reaction: rash; SOB)    HPI  This information was Wound #2 Right Heel is a Stage 3 Pressure Injury Pressure Ulcer and has received an outcome of Resolved. Subsequent wound encounter measurements are 0cm length x 0cm width x 0cm depth, with an area of 0 sq cm and a volume of 0 cubic cm.  There was no draina Wound #1 (Right Heel)  . Wound Treatment Note  Assessed patient’s pain status and effectiveness of pain management plan. Limb cleansed using Soap and water (1)  Applied Primary Wound Dressing.  using Mepilex Border 3x3 (1)  Offloading  Applied Offloading de

## 2018-05-04 NOTE — TELEPHONE ENCOUNTER
Nannette rowe [facility] to advise Dr. Magi Claudio of a delay in start of care. This is due to request of the patients spouse.  The care will be started 05/08/2018 and order will be faxed for signature

## 2018-05-08 NOTE — TELEPHONE ENCOUNTER
Wants to confirm Dr Jabari Campbell will be signing order's for px therapy in home. If so, let [facility] know.

## 2018-05-09 NOTE — TELEPHONE ENCOUNTER
Vonnie Gama from [facility] [place] is asking if [facility] will sign orders for home PT.     Please respond to pool: EM [facility] & long-term LPN/CMA

## 2018-05-10 NOTE — TELEPHONE ENCOUNTER
Heydi Raymond PT from [facility] just wanted a verbal okay that Dr Donn Handley will be signing PT orders. I informed her yes, per 05/8/18 telephone encounter. States that was all she needed, she will fax over orders.

## 2018-05-17 NOTE — TELEPHONE ENCOUNTER
Cat, PT from [facility] asking for verbal order to extend pt's PT once a week for one week and twice a week for one week. Please advise.     Please respond to pool: KIAN King 60 LPN/KARLA

## 2018-05-31 NOTE — TELEPHONE ENCOUNTER
calling(not HIPAA), phone and call was placed on speaker, verified name and  with patient and she answered form background, given phone consent to talk to her  Matias Cee .   Matias Cee states that he called the [employer] [facility] [facility]  About the PT referr

## 2018-05-31 NOTE — TELEPHONE ENCOUNTER
Yamila from  [facility] would like to get a order to extended her physical therapy once a week for the next two weeks      Please advise

## 2018-05-31 NOTE — TELEPHONE ENCOUNTER
Patient's  (not on HIPAA but patient gave verbal permission) stated that there is an issue with [employer] about the home health PT services.  He asked about Dr Dania Palacios approval of PT services for the patient; advised that, based on review of patient's

## 2018-06-01 NOTE — TELEPHONE ENCOUNTER
Detailed message left on confidential voicemail informing of Dr. Liang Thompson verbal approval. Office number provided to call office with any further questions or concerns.

## 2018-06-12 NOTE — TELEPHONE ENCOUNTER
Dr. Alessandra Johnson: are you willing to add on to schedule today? please note, I received a call from Physical therapist Stuart Moritz, who called stating patient fell and injuried RT shoulder. Therapist did not feel comfortable with therapy session.      Therapist states pa

## 2018-06-12 NOTE — TELEPHONE ENCOUNTER
Addendum: per MICHAEL, via [facility]robin, recommend ER. Spoke to Roseline, spouse; He explained Patient rolled out of chair forward and landed on shoulder. Did not hit her head. Per spouse, patient has dementia/confusion. He states no abnormal changes in behavior.   P

## 2018-06-14 NOTE — TELEPHONE ENCOUNTER
LMTCB  With Yamila from [place] [id number]. Attempted to call after hours but was sent to a voice message. Did not leave the message.

## 2018-06-14 NOTE — TELEPHONE ENCOUNTER
If she is otherwise asymptomatic I would not recommend ED. She is typically in upper 80s heart rate with us.

## 2018-06-14 NOTE — TELEPHONE ENCOUNTER
Jordy Leone, [facility] calling, states she is currently with pt whose heart rate is 105. Jordy Leone states pt is otherwise asymptomatic, denies pt having dizziness, headache, chest pain. States pt fell 2 days ago, was advised to go to ER but  declined.

## 2018-06-15 NOTE — TELEPHONE ENCOUNTER
Called Yamila ([facility]), left a message to call us back. Called [facility], was transferred to [place] [address], advised Dr Jemal Mora order and recommendation,states that will send the message to [facility], patient only has PT [facility] and no RN HH.       Note

## 2018-06-16 NOTE — TELEPHONE ENCOUNTER
Spoke to pt's , her HR have gone down to normal range and she remains asymptomatic. He states that home PT has been stopped due to insurance decision. He is very frustrated and is going to call [facility] on Monday to try to extend services.       FYAUGUSTINE

## 2018-06-16 NOTE — TELEPHONE ENCOUNTER
Jyoti Ellis from physical therapy needs a verbal order to extend physical therapy for once weekly for 2 more weeks. Please advise.

## 2018-06-16 NOTE — TELEPHONE ENCOUNTER
Attempt made to contact [address] PT at [facility]; no answer left message requesting a call back. Attempt made to contact patient to confirm how she is doing; no answer TCB.

## 2018-06-19 NOTE — TELEPHONE ENCOUNTER
Voicemail left on confidential voicemail for Yamila from PT informing of Dr. Price Willard verbal approval. Instructed to call office with any further questions or concerns.

## 2018-06-21 NOTE — TELEPHONE ENCOUNTER
From: Lizzette Strickland  To: Symone Ko DO  Sent: 6/20/2018 10:23 PM CDT  Subject: Prescription Question    Jeffrey Wang needs a new prescription for Norco 7.5/325mg. She is averaging 3 per day. Her last refill was in October 2017.  At that time she receive

## 2018-06-22 NOTE — TELEPHONE ENCOUNTER
Pt  stated that he received a call that pt Grace was ready for . He will like for the script to be mailed to pt address on file. Please call pt  when this has been done.  THanks

## 2018-06-25 NOTE — TELEPHONE ENCOUNTER
Kenneth Corley needs Dr To sign on the web portal to sign the orders on the portal for PT.      Ph # : [phone]  X [phone]

## 2018-06-27 NOTE — TELEPHONE ENCOUNTER
Shea Goddard called in requesting additional PT orders for the next two weeks. Shea Goddard is requesting to see pt once per week.

## 2018-06-28 NOTE — TELEPHONE ENCOUNTER
[facility] the order to 1 visit for 1 week, please advise

## 2018-06-29 NOTE — TELEPHONE ENCOUNTER
Dr Saba Peraza, do you want patient to get #90 for 30-day supply? Or other? [employer] can only dispense 30-day supply, also it's [place] law.   Pharmacy   [address], [address] [phone], [phone]   Medication Detail

## 2018-06-29 NOTE — TELEPHONE ENCOUNTER
Dr Irais Hickey, my fault for not being clear, pharmacist Kusum Jasmine said can give #90 for 30-day supply or #120 for 30-day supply. Your preference here?

## 2018-06-29 NOTE — TELEPHONE ENCOUNTER
Amy/ [facility] mail order requesting to confirm  for hydrocodone  By law cannot fill for this QTY-for  [place] cant exceed QTY for 30 days     Can take verbal    REF# [id number]

## 2018-06-30 NOTE — TELEPHONE ENCOUNTER
Hydrocodone left at  for . I left a detailed message on pt answering machine. If any questions to call us back.

## 2018-07-02 NOTE — TELEPHONE ENCOUNTER
[facility] pharmacist called to learn how many to dispense. Advised message was left, but pharmacist not received. Advised of Dr Gaye Bland 120.

## 2018-07-03 NOTE — TELEPHONE ENCOUNTER
Spoke with Jordy Leone, [facility] PT and reports as she was leaving today, patient's  stated patient starting to get a pressure ulcer the size of a nickel on left buttock. Jordy Leone states she did not see pressure ulcer--unable to give more details.       Spoke with akiko

## 2018-07-17 PROBLEM — J18.9 COMMUNITY ACQUIRED PNEUMONIA OF LEFT LUNG, UNSPECIFIED PART OF LUNG: Status: ACTIVE | Noted: 2018-01-01

## 2018-07-17 PROBLEM — R09.02 HYPOXIA: Status: ACTIVE | Noted: 2018-01-01

## 2018-07-17 PROBLEM — J18.9 COMMUNITY ACQUIRED PNEUMONIA OF LEFT LUNG: Status: ACTIVE | Noted: 2018-01-01

## 2018-07-17 NOTE — ED PROVIDER NOTES
Patient Seen in: [facility] AND [facility] Emergency Department    History   Patient presents with:  Fatigue    Stated Complaint: malaise    HPI    Patient presents to the emergency department with weakness at home.   She has history of severe kyphosis tobacco ab total) by mouth daily. LORazepam 0.5 MG Oral Tab,  Take 1 tablet (0.5 mg total) by mouth every 4 (four) hours as needed for Anxiety. calcitonin, salmon, 200 UNIT/ACT Nasal Solution,  1 spray by Nasal route daily. Budesonide-Formoterol Fumarate 160-4. systems reviewed and negative except as noted above. PSFH elements reviewed from today and agreed except as otherwise stated in HPI.     Physical Exam   ED Triage Vitals [07/17/18 1526]  BP: 138/72  Pulse: 94  Resp: 20  Temp: 98.2 °F (36.8 °C)  Temp src: evaluate the patient. He also requested pulmonary consultation Dr. Leonardo Aguiar was notified.     I spent a total of 40 minutes of critical care time in obtaining history, performing a physical exam, bedside monitoring of interventions, collecting and interpret reassessment of your blood pressure.       Clinical Impression:  Community acquired pneumonia of left lung, unspecified part of lung  (primary encounter diagnosis)  Weakness generalized  Hypoxia    Disposition:  Admit    Follow-up:  No follow-up provider sp

## 2018-07-17 NOTE — TELEPHONE ENCOUNTER
Pt's  called in stating that the sore on her backside is 10 times worse sense the dressing type changed to [facility]. Pt is not eating, she cannot stand and because of that PT was stopped.   wants Dr. Rosio Barron to know Pt's condition has gotten

## 2018-07-17 NOTE — ED INITIAL ASSESSMENT (HPI)
Pt here via EMS for generalized fatigue. Pt normally wears oxygen to sleep at night, upon EMS arrival oxygen saturation 88%.

## 2018-07-17 NOTE — ED NOTES
Report called to floor. 2nd abx sent to floor with patient for infusion once other abx is completed.

## 2018-07-18 NOTE — CM/SW NOTE
SW received MD order to eval for home needs, social support and ability to obtain medications. SW met w/ pt and pt's  to discuss eventual discharge needs. Pt lives at home w/ her supportive , who is the main caregiver for pt.  Pt lives in a

## 2018-07-18 NOTE — WOUND PROGRESS NOTE
WOUND CARE NOTE      PLAN   Recommendations:  Turn schedules  Heels elevated using pillows, heel wedge or heel boots to offload heels  Use of lift equipment  Prompt incontinence care  Moisture barrier for incontinence.  May consider ALOE VESTA    Wound(s)

## 2018-07-18 NOTE — PROGRESS NOTES
Esomeprazole is a non-formulary medication and will be therapeutically interchanged to Pantoprazole per P&T protocol

## 2018-07-18 NOTE — PLAN OF CARE
Problem: RESPIRATORY - ADULT  Goal: Achieves optimal ventilation and oxygenation  INTERVENTIONS:  - Assess for changes in respiratory status  - Assess for changes in mentation and behavior  - Position to facilitate oxygenation and minimize respiratory effo instructed to call for help. Call light within reach.

## 2018-07-18 NOTE — WOUND PROGRESS NOTE
Attempted to see pt, however pt just finished working w/ PT and is up in the chair.  Will check back later this afternoon

## 2018-07-18 NOTE — PROGRESS NOTES
[facility]    Progress Note    Estela Fajardo Patient Status:  Inpatient    1945 MRN [id number]   Location [facility] 5SW/SE Attending Mayco Perales MD   Hosp Day # 1 PCP Markell Valencia DO       Subjective:   Gisela Lopez BILT 0.5 03/29/2018   TP 6.5 03/29/2018   AST 26 03/29/2018   ALT 19 03/29/2018   PTT 32.9 11/11/2017   INR 1.0 11/14/2017   TSH 2.60 11/12/2017   ESRML 22 03/29/2018   CRP 0.5 03/29/2018   MG 1.8 11/15/2017   TROP 0.01 11/11/2017   CK 78 09/28/2017   B1 MD  **Certification      PHYSICIAN Certification of Need for Inpatient Hospitalization - Initial Certification    Patient will require inpatient services that will reasonably be expected to span two midnight's based on the clinical documentation in H+P.

## 2018-07-18 NOTE — PLAN OF CARE
Problem: Patient Centered Care  Goal: Patient preferences are identified and integrated in the patient's plan of care  Interventions:  - What would you like us to know as we care for you?  My  Mike Art is involved in care  - Provide timely, complete, an skin integrity  - Monitor for areas of redness and/or skin breakdown  - Initiate interventions, skin care algorithm/standards of care as needed   Outcome: Progressing  Mepelixes applied to Left buttocks, Right heel, and Left elbow skin tear.   Chichi lucio

## 2018-07-18 NOTE — H&P
[facility]    PATIENT'S NAME: Rosi Connors   ATTENDING PHYSICIAN: Renard Vasquez MD   PATIENT ACCOUNT#:   [id number]    LOCATION:  [place] [address]  MEDICAL RECORD #:   [id number]       YOB: 1945  ADMISSION DATE:       07/17 denies any choking or cough while she is swallowing her food, but she seemed very drowsy and short of breath lately. She had a sporadic cough that became more pronounced in the last few days. No fever or chills reported by the .   The  said  also to evaluate patient. Further recommendations to follow.     Dictated By Rita Farias MD  d: 07/17/2018 17:25:29  t: 07/17/2018 17:59:43  [facility] [id number]  YJ/

## 2018-07-18 NOTE — SLP NOTE
ADULT SWALLOWING EVALUATION    ASSESSMENT    ASSESSMENT/OVERALL IMPRESSION:  Orders rec'd, chart reviewed. SLP consult for swallow evaluation rec'd following MD suspicion for possible aspiration given patient's current respiratory status and pneumonia.  Pt rate;No straw  Medication Administration Recommendations: Crushed in puree  Treatment Plan/Recommendations: Aspiration precautions; Videofluoroscopic swallow study  Discharge Recommendations/Plan: Undetermined    HISTORY   MEDICAL HISTORY  Reason for Referr Propulsion: Impaired  Mastication: Impaired  Retention: Impaired    Pharyngeal Phase of Swallow: Impaired  Laryngeal Elevation Timing: Appears intact  Laryngeal Elevation Strength: Appears impaired  Laryngeal Elevation Coordination: Appears impaired  (Plea

## 2018-07-18 NOTE — PROGRESS NOTES
[facility]     Progress Note        Wong Stage Patient Status:  Inpatient    1945 MRN [id number]   Location [facility] 5SW/SE Attending Sharath Chong MD   Hosp Day # 1 PCP Jacklyn Whalen DO       Subjective:   Elena Yoo hydrocodone-acetaminophen (NORCO) 7.5-325 MG per tab 1 tablet 1 tablet Oral Q6H PRN   LORazepam (ATIVAN) tab 0.5 mg 0.5 mg Oral Q4H PRN   latanoprost (XALATAN) 0.005 % ophthalmic solution 1 drop 1 drop Both Eyes Nightly   Memantine HCl (NAMENDA) tab 10 m Dictated by (CST): Paola Talamantes MD on 7/17/2018 at 16:31     Approved by (CST): Paola Talamantes MD on 7/17/2018 at 16:36            Ekg 12-lead    Result Date: 7/17/2018  ECG Report  Interpretation  -------------------------- Sinus Rhythm -Anteroseptal in

## 2018-07-18 NOTE — CONSULTS
[facility]    Report of Consultation    Jorge Luis Lees Patient Status:  Inpatient    1945 MRN [id number]   Location [facility] 5SW/SE Attending Celestino Landin MD   Hosp Day # 0 PCP Gunjan Alvarado DO     Date of Admissio Topics            Concern  Caffeine Concern        Yes    Comment:coffee, 1/2 cup    Social History Narrative    None on file            Current Medications:    Current Facility-Administered Medications:  azithromycin (ZITHROMAX) 500 mg in sodium chloride tab 150 mg 150 mg Oral Nightly   Sertraline HCl (ZOLOFT) tab 300 mg 300 mg Oral Daily   Oxybutynin Chloride ER (DITROPAN-XL) 24 hr tab 5 mg 5 mg Oral Daily   [START ON 7/18/2018] [facility] WOUND/BURN DRESSING GEL 1 Application 1 Application Topical Daily P Take 300 mg by mouth daily.      Spacer/Aero-Holding Chambers (AEROCHAMBER PLUS CHETAN-VU) Does not apply Misc        Allergies    Clindamycin             RASH, SHORTNESS OF BREATH    Comment:Per pt received this medicine and bactrim in ER             not sure TROP 0.01 11/11/2017   CK 78 09/28/2017   B12 927 (H) 10/02/2017         Imaging  Xr Chest Ap Portable  (cpt=71045)    Result Date: 7/17/2018  CONCLUSION:  1. There is new left basilar consolidation which may suggest pneumonia/atelectasis.  Can't exclude

## 2018-07-18 NOTE — PHYSICAL THERAPY NOTE
PHYSICAL THERAPY EVALUATION - INPATIENT     Room Number: 530/530-A  Evaluation Date: 7/18/2018  Type of Evaluation: Initial           Reason for Therapy: Mobility Dysfunction and Discharge Planning    PHYSICAL THERAPY ASSESSMENT     Patient is a 74 year o Comment: per NG; L CTS surgery  2009: CATARACT EXTRACTION  1-: COLONOSCOPY & POLYPECTOMY  No date: FRACTURE SURGERY      Comment: LEFT LEG   01/16/2013: IR KYPHOPLASTY      Comment: Kyphoplasty, L-3 & T-10  2008: UPPER GI ENDOSCOPY PERFORMED      Co training  Transfer training    Patient End of Session: Up in chair    Patient was able to achieve the following . ..    Patient able to transfer  At previous, functional level    Patient able to ambulate on level surfaces  unable

## 2018-07-18 NOTE — OCCUPATIONAL THERAPY NOTE
OCCUPATIONAL THERAPY EVALUATION - INPATIENT     Room Number: 530/530-A  Evaluation Date: 7/18/2018  Type of Evaluation: Initial  Presenting Problem:  (weakness)    Physician Order: IP Consult to Occupational Therapy  Reason for Therapy: ADL/IADL Dysfunct ORIF   • Anxiety state    • Back problem    • Carpal tunnel syndrome 2009    L CTS surgery   • Chronic fatigue    • Colon polyp 1-   • Diverticulosis 1-   • Esophageal reflux    • Fibromyalgia    • Glaucoma     BOTH SIDES    • Osteoarthrit A Lot    AM-PAC Score:  Score: 8  Approx Degree of Impairment: 85.69%  Standardized Score (AM-PAC Scale): 22.86  CMS Modifier (G-Code): CM    FUNCTIONAL TRANSFER ASSESSMENT  Supine to Sit : Dependent assistance  Sit to Stand: Dependent assistance    Chair

## 2018-07-18 NOTE — PROGRESS NOTES
Goals of Care Conversation  -d/w patient and   -for now full code, but  does not think his wife would want to be resuscitated  -unclear if Ms. Pennie Varela understands code status as she has some dementia  -we also discussed she will likely need m

## 2018-07-19 PROBLEM — Z71.89 GOALS OF CARE, COUNSELING/DISCUSSION: Status: ACTIVE | Noted: 2018-01-01

## 2018-07-19 PROBLEM — Z71.89 ADVANCE CARE PLANNING: Status: ACTIVE | Noted: 2018-01-01

## 2018-07-19 NOTE — PROGRESS NOTES
[facility]     Progress Note        Leeanne Castaneda Patient Status:  Inpatient    1945 MRN [id number]   Location [facility] 5SW/SE Attending Leopold Mcalpine, MD   Hosp Day # 2 PCP Ernesto Gray DO       Subjective:   Kym Ocampo LORazepam (ATIVAN) tab 0.5 mg 0.5 mg Oral Q4H PRN   latanoprost (XALATAN) 0.005 % ophthalmic solution 1 drop 1 drop Both Eyes Nightly   Memantine HCl (NAMENDA) tab 10 mg 10 mg Oral BID   Pantoprazole Sodium (PROTONIX) EC tab 40 mg 40 mg Oral QAM AC   QUE pleural effusion. Followup studies including PA and lateral are advised. Prominent pulmonary markings throughout the interstitium suggesting interstitial fibrosis.      Dictated by (CST): Eugenie Jane MD on 7/17/2018 at 16:31     Approved by (CST): Juan Pablo Adhikari

## 2018-07-19 NOTE — CM/SW NOTE
Addend 425p:     Dr. Robert Newton informed SW that she did peer to peer w/ Dr. Gayatri Lovett, in which insurance will cover pt's rehab for one week.  SW spoke w/ IHP/Cornelia regarding coverage who stated she needs an admitting dx for rehab; SW referred CTL/Lizbet to co

## 2018-07-19 NOTE — PROGRESS NOTES
[facility]    Progress Note    Jess Espino Patient Status:  Inpatient    1945 MRN [id number]   Location [facility] 5SW/SE Attending Theodore Carreon MD   [address] Day # 2 PCP Eliezer Sauceda DO       Subjective:   Heath Lopez 3.5 03/29/2018   ALKPHO 101 (H) 03/29/2018   BILT 0.5 03/29/2018   TP 6.5 03/29/2018   AST 26 03/29/2018   ALT 19 03/29/2018   PTT 32.9 11/11/2017   INR 1.0 11/14/2017   TSH 2.60 11/12/2017   ESRML 22 03/29/2018   CRP 0.5 03/29/2018   MG 1.6 (L) 07/19/2018

## 2018-07-19 NOTE — PLAN OF CARE
Problem: Patient Centered Care  Goal: Patient preferences are identified and integrated in the patient's plan of care  Interventions:  - What would you like us to know as we care for you?  My  Vikas Villeda is involved in care  - Provide timely, complete, an Assess and document skin integrity  - Monitor for areas of redness and/or skin breakdown  - Initiate interventions, skin care algorithm/standards of care as needed   Outcome: Progressing    Goal: Incision(s), wounds(s) or drain site(s) healing without S/S

## 2018-07-19 NOTE — SLP NOTE
ADULT VIDEOFLUOROSCOPIC SWALLOWING STUDY    Admission Date: 7/17/2018  Evaluation Date: 07/19/18  Radiologist:  Dr. Stuart Martinez:    Speech to f/u x1 session/meal for dysphagia treatment.  Focus will be to ensure safe tolerance of diet and to reinfor improved from July 18, 2018.   3. Markings remain prominent with basilar fibrosis.         Reason for Referral: R/O aspiration      Family/Patient Goals: \"to feel better'    ASSESSMENT   DYSPHAGIA ASSESSMENT  Test completed in conjunction with Radiologist. The patient will utilize compensatory strategies as outlined by  VFSS including Slow rate, Small bites, Small sips, Alternate liquids/solids, No straws with minimal assistance 90 % of the time across 1 session.     In Progress   EDUCATION/INSTRUCTION  Revie

## 2018-07-19 NOTE — DIETARY NOTE
ADULT NUTRITION INITIAL ASSESSMENT    Pt is at moderate nutrition risk. Pt meets malnutrition criteria.       CRITERIA FOR MALNUTRITION DIAGNOSIS:  Criteria for non-severe malnutrition diagnosis: chronic illness related to body fat mild depletion and mus Chronic fatigue    • Colon polyp 1-   • Diverticulosis 1-   • Esophageal reflux    • Fibromyalgia    • Glaucoma     BOTH SIDES    • Osteoarthritis    • Spinal stenosis    • Sternal fracture 02/ 2011    from fall        ANTHROPOMETRICS:  HT: 1 Furoate-Vilanterol  1 puff Inhalation Daily       LABS: reviewed    Recent Labs   07/17/18  1551 07/18/18  0546 07/18/18  1728 07/19/18  0555   * 136*  --  105*   BUN 23* 19  --  12   CREATSERUM 0.70 0.52  --  0.56   CA 9.6 8.7  --  8.4*   MG  --

## 2018-07-19 NOTE — PLAN OF CARE
Problem: Patient Centered Care  Goal: Patient preferences are identified and integrated in the patient's plan of care  Interventions:  - What would you like us to know as we care for you?  My  Kleber Nevarez is involved in care  - Provide timely, complete, an integrity  - Monitor for areas of redness and/or skin breakdown  - Initiate interventions, skin care algorithm/standards of care as needed   Outcome: Progressing    Goal: Incision(s), wounds(s) or drain site(s) healing without S/S of infection  INTERVENTIO

## 2018-07-20 NOTE — CONSULTS
[facility]  Palliative Care Follow Up    Becca Wong Patient Status:  Inpatient    1945 MRN [id number]   Location [facility] 5SW/SE Attending Aidan Leonard MD   Hosp Day # 3 PCP Connie Martin DO     Date of Consult Albuterol Sulfate  (90 Base) MCG/ACT inhaler 2 puff, 2 puff, Inhalation, Q6H PRN  •  aspirin chewable tab 81 mg, 81 mg, Oral, Daily  •  BuPROPion HCl ER (XL) (WELLBUTRIN XL) 150 MG 24 hr tab 150 mg, 150 mg, Oral, Daily  •  Butalbital-APAP-Caffeine ( 7/19/2018  CONCLUSION: Normal examination. Dictated by (CST): Paula Berumen MD on 7/19/2018 at 10:20     Approved by (CST):  Paula Berumen MD on 7/19/2018 at 10:20          Xr Chest Ap Portable  (cpt=71045)    Result Date: 7/19/2018  CONCLUSION 4818  Pre-existing DNR/DNI Order: Yes, notify physician for order  Describe Patient Wishes: DNR,DNI, no g-tube and continue supportive care    Spiritual needs addressed: Referral placed for Spiritual Care    Disposition: SNF palliative care    Procedures:

## 2018-07-20 NOTE — HOME CARE LIAISON
Attempted to meet with patient, who was sleeping. Contacted patient's spouse, ([phone]),  to discuss Palliative services at [facility] facility.   verbalized agreement with Palliative follow up and has [facility] cont

## 2018-07-20 NOTE — PROGRESS NOTES
Pt discharged to [facility], report given to ANTONIO Loja. Discharge information and prescriptions reviewed. All questions and concerns addressed. IV access discontinued.

## 2018-07-20 NOTE — CM/SW NOTE
Dr. Nelli Echeverria informed CHARANJIT that pt will be medically cleared to discharge today. ANTONIO/Aaliyah stated pt will need ambulance transport (complete assist). RN informed CHARANJIT that she spoke w/ pt's  of discharge today,  requested 130p discharge.      CHARANJIT s

## 2018-07-20 NOTE — DISCHARGE SUMMARY
[facility]    Discharge Summary    Brennen Reyna Patient Status:  Inpatient    1945 MRN [id number]   Location [facility] 5SW/SE Attending Jay Marquez MD   Hosp Day # 3 PCP Ai [place] DO     Date of Admission: Community acquired pneumonia of left lung, unspecified part of lung     Hypoxia     Goals of care, counseling/discussion     Advance care planning      Reason for Admission:   Patient Active Problem List:     GERD (gastroesophageal reflux disease)     Anxi Illness:   Per Dr. Chance Jara:  Generalized weakness, development of left buttock pressure ulcer, and possible aspiration pneumonia with dehydration.    HISTORY OF PRESENT ILLNESS:  The patient is a 27-year-old  female with severe Amoxicillin-Pot Clavulanate 500-125 MG Tabs  Commonly known as:  AUGMENTIN      Take 1 tablet by mouth 3 (three) times daily. Stop taking on:  7/24/2018  Refills:  0     aspirin 81 MG Chew      Chew 1 tablet (81 mg total) by mouth daily.    Quantity:  3 Esomeprazole Magnesium      Take 20 mg by mouth 2 (two) times daily. Refills:  0     QUEtiapine Fumarate 100 MG Tabs  Commonly known as:  SEROQUEL      150 mg nightly.    Refills:  0     Sertraline HCl 100 MG Tabs  Commonly known as:  ZOLOFT      Take 300

## 2018-07-20 NOTE — PROGRESS NOTES
[facility]     Progress Note        Marky Meek Patient Status:  Inpatient    1945 MRN [id number]   Location [facility] 5SW/SE Attending Nikki Black MD   Hosp Day # 3 PCP Lucy Robertson DO       Subjective:   Zay Edwards tablet Oral Q6H PRN   LORazepam (ATIVAN) tab 0.5 mg 0.5 mg Oral Q4H PRN   latanoprost (XALATAN) 0.005 % ophthalmic solution 1 drop 1 drop Both Eyes Nightly   Memantine HCl (NAMENDA) tab 10 mg 10 mg Oral BID   Pantoprazole Sodium (PROTONIX) EC tab 40 mg 40 Plan   -Patient presents with evidence of worsening dyspnea at rest, cough and generalized malaise. Concern for possible community-acquired pneumonia. -Chest x-ray with evidence of left lower lobe infiltrate versus atelectasis.   Repeat chest x-ray with n

## 2018-07-20 NOTE — PLAN OF CARE
Problem: Patient Centered Care  Goal: Patient preferences are identified and integrated in the patient's plan of care  Interventions:  - What would you like us to know as we care for you?  My  Grace Rodriguez is involved in care  - Provide timely, complete, an Progressing      Problem: SKIN/TISSUE INTEGRITY - ADULT  Goal: Incision(s), wounds(s) or drain site(s) healing without S/S of infection  INTERVENTIONS:  - Assess and document risk factors for pressure ulcer development  - Assess and document skin integrity

## 2018-07-23 NOTE — PROGRESS NOTES
Estela Fajardo  : 1945  Age 74year old  female patient is admitted to [place] [address] for strengthening and rehab on 18    Admitted to [address] [place] [address] Avenue: 2018-2018    Chief complaint: Weakness, PNA, COPD, Dependence on O2/Palliative care to nicole Deedee Garcia  Family History   Problem Relation Age of Onset   • Cancer Father      osteo cancer- cause of death     Smoking status: Former Smoker                                                              Packs/day: 1.20      Years: 40.00        Types: Cigaret by Nasal route daily. Disp: 3 Bottle Rfl: 3   Budesonide-Formoterol Fumarate 160-4.5 MCG/ACT Inhalation Aerosol Inhale 2 puffs into the lungs 2 (two) times daily.  Disp:  Rfl:    acetaminophen 325 MG Oral Tab Take 2 tablets (650 mg total) by mouth every 6 ( frequency; no vaginal discharge; no urinary incontinence; no hematuria  MUSCULOSKELETAL:Chronic overall pain to neck  NEURO:denies tremors  PSYCHE: +history of depression and anxiety   HEMATOLOGY:denies excessive bleeding  ENDOCRINE: denies excessive thirs Wyatt Funk, super pudding  -Dietician consulted in rehab  -Monitor labs     4.  Chronic Pain and anxiety with depression  -cont meds: lorazepam, wellbutrin, seroquel, and zoloft  -Psych can follow in rehab but  refused last time  -CTM  -In room close to

## 2018-07-31 NOTE — PROGRESS NOTES
Rita Wade, 9/11/1945, 74year old, female at [place] [address] for strengthening and rehab on 7/20/18     Admitted to [address] [place] [address]: 7/17/2018-7/20/2018     Chief complaint: Weakness, PNA, COPD, Dependence on O2/Palliative care to follow     HPI  74year old fema no TMG, no carotid bruits  BREAST: deferred  RESPIRATORY:Diminished overall:  CARDIOVASCULAR: S1, S2 normal, RRR; no S3, no S4;   ABDOMEN:  normal active BS+, soft, nondistended; no organomegaly, no masses; no bruits; nontender, no guarding, no rebound ten counseling the patient and/or coordinating care.     HOLLEY Quintana  7/31/2018  12:31 PM

## 2018-08-01 NOTE — PROGRESS NOTES
Leeanne Castaneda, 9/11/1945, 74year old, female at [facility] for strengthening and rehab on 7/20/18     Admitted to [address] [place] [address]: 7/17/2018-7/20/2018     Chief complaint: Weakness, PNA, COPD, Dependence on O2/Palliative care to follow     HPI  74year old fema ABDOMEN:  normal active BS+, soft, nondistended; no organomegaly, no masses; no bruits; nontender, no guarding, no rebound tenderness.   :no suprapubic distension  LYMPHATIC:no lymphedema  MUSCULOSKELETAL: no acute synovitis upper or lower extremity  EX HOLLEY  8/1/2018  11:08 AM

## 2018-08-03 NOTE — PROGRESS NOTES
Isis Booker, 9/11/1945, 74year old, female is being discharged from [place] [address] 8/4/18      [address]    Date of Admission [address] [place] [address] Avenue : 7/17/2018-7/20/2018    Date of Discharge Benjamin VIVIAN: 7/20/18 to 8/4/18                                Admitt cough   CARDIOVASCULAR:denies chest pain, no palpitations   GI: denies nausea, vomiting, constipation, diarrhea; no rectal bleeding; no heartburn  :no dysuria, urgency or frequency; no vaginal discharge; no urinary incontinence; no hematuria  MUSCULOSKEL buttocks   -wound RN and MD to follow  -air flow mattress   -Ensure and Juveen supplements   -CTM      3.  Severe Protein Calorie Malnutrion  -with temporal wasting  -poor po intake  -Ensure, Juveen, super pudding  -Dietician consulted in rehab  -Monitor la

## 2018-08-06 NOTE — TELEPHONE ENCOUNTER
Claduia From [facility] wants to inform Dr that pt has started home health. And also I has a very bad womb which is cover in yellow slough.     [facility] also need as order for Breanna to be called into [facility] in [facility].      Please advise

## 2018-08-07 NOTE — TELEPHONE ENCOUNTER
Dr Nathan Quarles, please advise    Please respond to pool: EM [facility] LPN/CMA

## 2018-08-07 NOTE — PROGRESS NOTES
Initial Post Discharge Follow Up   [facility] Discharge Date: 7/20/18  SNF Discharge Date: 8/4/18  Contact Date: 8/6/2018    Consent Verification:  Assessment Completed With: Patient and Billy Ansari on speaker phone w verbal concent  HIPAA Verified?   Yes    Discharge 0.5 MG Oral Tab Take 1 tablet (0.5 mg total) by mouth every 4 (four) hours as needed for Anxiety. Disp: 20 tablet Rfl: 0   calcitonin, salmon, 200 UNIT/ACT Nasal Solution 1 spray by Nasal route daily.  Disp: 3 Bottle Rfl: 3   Budesonide-Formoterol Fumarate yesterday   DME ordered at D/C? No    Services ordered at D/C?   Yes   What services:   Rehab, CHF, Stroke, PT, OT, Speech, Other: PT and OT       Needs post D/C:   Now that you are home, are there any needs or concerns you need addressed before your next vi

## 2018-08-07 NOTE — TELEPHONE ENCOUNTER
Dr Jacey Zamora, please note. Patient's  identified [facility] as the network provider for the wound care supplies needed for patient. He said they will be in contact with the doctor's office.     Please respond to pool: KIAN [facility] LPN/KARLA Alcantar

## 2018-08-07 NOTE — TELEPHONE ENCOUNTER
Dr. Leatha Hollins, please note. Please respond to pool: KIAN [facility] 60 LPN/CMA--please fax DME order for wound supplies to [facility] nurse, she'll send on to [address] nurse called, send DME order to Neetu Mack at [place] # [phone].

## 2018-08-07 NOTE — TELEPHONE ENCOUNTER
Dr Magi Claudio, please advise. Sonja Leal[facility] back, she is at [phone]. Just opened home care 8/6/18. There is a 3cm by 4cm non-stageable pressure ulcer on patient's sacrum, draining pale yellow, slough covered, no depth known.  It's been covered

## 2018-08-07 NOTE — TELEPHONE ENCOUNTER
Dr Vinicio Kapadia, please note. Phani Boykin, [facility] nurse, Advised nurse on Dr. Charles Aguiar script, nurse verbalized understanding. She asked about the 's call for wound care supplies; see separate Orders encounter created.  Nurse stated that patient has appt

## 2018-08-07 NOTE — TELEPHONE ENCOUNTER
Dr Taisha Sawyer, please advise, DME order created, please review. Staff, please contact  on this order to see where it is to be sent.     Please respond to pool: KIAN [facility] LPN/CMA

## 2018-08-08 NOTE — TELEPHONE ENCOUNTER
Dr Kennedy Monterroso for Dr Tiffanie Nugent,      See [employer] message and advise. .prm  Please reply to pool: KIAN Zafar

## 2018-08-08 NOTE — TELEPHONE ENCOUNTER
See both encounter from 8/7, duplicate encounter regarding same order, DME order faxed [phone]. Flako Cotto

## 2018-08-08 NOTE — TELEPHONE ENCOUNTER
Dr Bhaskar Borges,    I reviewed the chart and saw the DME orders addressed on 8/7/18 encounter. Please respond to pool: EM [facility] LPN/CMA see encounter 8/7/18 and make sure the DME orders were faxed to [place].

## 2018-08-08 NOTE — TELEPHONE ENCOUNTER
From: Meche Holloway  To: Lul Martinez DO  Sent: 8/8/2018 2:17 PM CDT  Subject: Other    [facility] [address] [facility] to treat Joann's wound.  She said we need a gel foam overlay for her mattress and a gel foam pad for her whe
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
## 2018-08-09 PROBLEM — E86.0 DEHYDRATION: Status: ACTIVE | Noted: 2018-01-01

## 2018-08-09 PROBLEM — J18.9 NOSOCOMIAL PNEUMONIA: Status: ACTIVE | Noted: 2018-01-01

## 2018-08-09 PROBLEM — E83.42 HYPOMAGNESEMIA: Status: ACTIVE | Noted: 2018-01-01

## 2018-08-09 NOTE — SLP NOTE
ADULT SWALLOWING EVALUATION    ASSESSMENT    ASSESSMENT/OVERALL IMPRESSION:  BSSE orders rec'd, chart reviewed. Pt known to SLP department with recent admission and [facility] compelted 7/19/18.  At that time, pt with no penetration, no aspiration with diet recom swallows  Aspiration Precautions: Upright position; Slow rate;Small bites and sips; No straw  Medication Administration Recommendations: Crushed in puree  Treatment Plan/Recommendations: Aspiration precautions  Discharge Recommendations/Plan: Undetermined Appears impaired  Laryngeal Elevation Strength: Appears impaired  Laryngeal Elevation Coordination: Appears impaired  (Please note: Silent aspiration cannot be evaluated clinically. Videofluoroscopic Swallow Study is required to rule-out silent aspiration.

## 2018-08-09 NOTE — H&P
[facility]    PATIENT'S NAME: Aubrey List   ATTENDING PHYSICIAN: Venecia Farr MD   PATIENT ACCOUNT#:   [id number]    LOCATION:  [address] [place] [address],[address] RECORD #:   [id number]       YOB: 1945  ADMISSION DATE: and cervical kyphosis; anxiety; osteoporosis; kyphoplasty L3 and T1; bilateral lower extremity fractures requiring ORIF and external fixation; cataract surgery bilaterally; carpal tunnel release bilaterally; multiple lumbar spinal epidural steroid injectio Denies any alcohol or drug use. Lives with her  who is her primary caregiver. Requires assistance with all her activities of daily living.     REVIEW OF SYSTEMS:  The patient has been having more choking with swallowing lately and has had a more co potassium 3.3, chloride 102, CO2 of 27, BUN 22 with creatinine 0.7, calcium 9.3, anion gap 12, magnesium 1.5. . Lactic acid level 2.6. White count 14.8 with 67% neutrophils and 18% bands, platelet count 250,617.   Urinalysis did not appear infecte protocol. 9.   GI prophylaxis:  Protonix. 10.   The patient's current clinical status and proposed treatment plan was discussed with her. All her questions were answered, and she agreed with the plan of care as outlined above.      Dictated By Pat Brewer

## 2018-08-09 NOTE — PROGRESS NOTES
[facility] dosing service    Initial Pharmacokinetic Consult for Vancomycin Dosing     Kory Brown is a 74year old female who is being treated for pneumonia.   Pharmacy has been asked to dose Vancomycin by Dr. Mary Jane Westfall    She is allergic to clindam weight, renal function, and pharmacokinetics. 2.  Pharmacy ordered Vancomycin trough level(s) prior to 4 th dose. Goal trough level 15-20 ug/mL. 3.  Pharmacy will need BUN/Scr daily while on Vancomycin to assess renal function.     4.  Pharmacy will

## 2018-08-09 NOTE — WOUND PROGRESS NOTE
WOUND CARE NOTE      PLAN   Recommendations:  Dietary consult for recommendations for nutrition to optimize wound healing  Turn schedules  Specialty bed: 1st step  Heels elevated using pillows, heel wedge or heel boots to offload heels  Prompt incontinen 08/09/2018   ALKPHO 90 08/09/2018   BILT 0.5 08/09/2018   TP 6.1 08/09/2018   AST 57 (H) 08/09/2018   ALT 46 08/09/2018   MG 1.5 (L) 08/09/2018

## 2018-08-09 NOTE — ED NOTES
gave patient tylenol cold medicine at 2000, fever 101 at home. 2 episodes of loose stool pta, 1 episode of loose stool while in ED.

## 2018-08-09 NOTE — PLAN OF CARE
Problem: Patient/Family Goals  Goal: Patient/Family Short Term Goal  Patient's Short Term Goal: Stop coughing     Interventions:   - Monitor vitals  - Administer antibiotics   - See additional Care Plan goals for specific interventions   Outcome: Progressi fall precautions as indicated by assessment.  - Educate pt/family on patient safety including physical limitations  - Instruct pt to call for assistance with activity based on assessment  - Modify environment to reduce risk of injury  - Provide assistive d

## 2018-08-09 NOTE — TELEPHONE ENCOUNTER
J Luis Bell from [facility] to inform Dr Sheth was admitted to [facility] with Pneumonia

## 2018-08-09 NOTE — PROGRESS NOTES
[facility]      Sepsis Reassessment Note    BP (!) 150/92   Pulse 106   Temp 98.5 °F (36.9 °C) (Temporal)   Resp 22   Wt 40.8 kg   SpO2 98%   BMI 15.94 kg/m²      3:57 AM    Cardiac:  Regularity: Regular  Rate: Normal  Heart Sounds: S1,S2

## 2018-08-09 NOTE — ED PROVIDER NOTES
Patient Seen in: [facility] Emergency Department    History   Patient presents with:  Fatigue (constitutional, neurologic)    Stated Complaint:     HPI    66-year-old female with chronic medical problems brought by EMS with feelings of weakness. Oriented to person, place, and time. Appears well-developed. No distress. Head: Normocephalic and atraumatic. Eyes: Conjunctivae are normal. Pupils are equal, round, and reactive to light.    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   Procedure Disposition and Plan     Clinical Impression:  Nosocomial pneumonia  (primary encounter diagnosis)  Dehydration  Hypomagnesemia    Disposition:  Admit  8/9/2018  2:21 am    Follow-up:  No follow-up provider specified.   We recommend that you kassandra

## 2018-08-09 NOTE — PROGRESS NOTES
ADMISSION NOTE    74year old female presents with weakness subjective fever elevated lactic acid, retrocardiac pneumonia and buttock decub as well. Available medical records partially reviewed. Dictation to follow.     Torsten Infante M.D.  8/9/2018

## 2018-08-09 NOTE — CM/SW NOTE
Sw contacted pt's /Dangelo to discuss d/c planning. Pt lives at home w/ spouse.  stated pt uses a wheelchair and has a chair lift in the home. Pt is current w/ [facility] and was recently at [facility] for SNF.   stated plan is for pt to return

## 2018-08-10 NOTE — PROGRESS NOTES
[place] [facility]    Progress Note    Lidia Gerber Patient Status:  Inpatient    1945 MRN [id number]   Location [facility] 5SW/SE Attending Leydi Velazquezissa Day # 1 PCP Owen Spence DO        Subjective: asked Wound Care for evaluation and possible special bed for the patient. 5.       Liquid mucous stools, rule out Clostridium difficile or other infectious causes: The patient was a nursing home resident recently. We will await PCR results.   In the mean MD ANA on 8/09/2018 at 6:00     Approved by (CST): Mariely Ornelas MD on 8/09/2018 at 6:02          Ekg 12-lead    Result Date: 8/9/2018  ECG Report  Interpretation  -------------------------- Sinus Tachycardia -Poor R-wave progression -nonspecific

## 2018-08-10 NOTE — PLAN OF CARE
Problem: Patient/Family Goals  Goal: Patient/Family Long Term Goal  Patient's Long Term Goal: Get rid of this pneumonia     Interventions:  - Administer antibiotics  - See additional Care Plan goals for specific interventions    Outcome: [address] as appropriate  - Consider OT/PT consult to assist with strengthening/mobility  - Encourage toileting schedule   Outcome: Progressing      Problem: Patient Centered Care  Goal: Patient preferences are identified and integrated in the patient's plan of care

## 2018-08-10 NOTE — SLP NOTE
SPEECH DAILY NOTE - INPATIENT    ASSESSMENT & PLAN   ASSESSMENT  Patient alert and pleasant, seated upright in bed. Spouse at [facility]. Patient/spouse expressing frustration with pureed foods. Patient with poor intake d/t disliking the texture.   Reviewed rati is tolerating pureed, nectar-thick liquids without difficulty. Will continue to monitor for tolerance of nectar-thick via straw.   In Progress   Goal #2 The patient/family/caregiver will demonstrate understanding and implementation of aspiration precaution

## 2018-08-10 NOTE — DIETARY NOTE
ADULT NUTRITION INITIAL ASSESSMENT    Pt is at moderate nutrition risk. Pt does not meet malnutrition criteria. RECOMMENDATIONS TO MD: May wish to add Multivitamin with minerals if pt can safely take, in future.     NUTRITION DIAGNOSIS/PROBLEM:  Incre Esophageal reflux    • Fibromyalgia    • Glaucoma     BOTH SIDES    • Osteoarthritis    • Spinal stenosis    • Sternal fracture 02/ 2011    from fall        ANTHROPOMETRICS:  HT:    WT: 52.7 kg (116 lb 1.6 oz)   BMI: Body mass index is 20.57 kg/m².   BMI CL PHYSICAL FINDINGS:  - Body Fat/Muscle Mass: mild depletion body fat fat overlying rib cage region per visual exam.    - Fluid Accumulation: no edema noted per visual exam    - Skin Integrity: breakdown.  Sacral and R heel deep tissue injury.  - Papi score

## 2018-08-10 NOTE — OCCUPATIONAL THERAPY NOTE
OCCUPATIONAL THERAPY EVALUATION - INPATIENT     Room Number: 812/255-Z  Evaluation Date: 8/10/2018  Type of Evaluation: Initial       Physician Order: IP Consult to Occupational Therapy  Reason for Therapy: ADL/IADL Dysfunction and Discharge Planning    OC 93%.    Patient presents with quite the severity of deficits; patient may not be rehab appropriate as she was just in rehab and made little progress; patient may benefit from [facility] therapies to improve functional t/f to reduce burden of care and even to focus PERFORMED      Comment: done by dr.mc arias    HOME SITUATION  Type of Home: House  Home Layout: Able to live on main level  Lives With: Spouse     Toilet and Equipment: 3-in-1 commode     Other Equipment:  (w/c, RW,)          Drives: No       Stairs in [facility] off regular upper body clothing?: Total  -   Taking care of personal grooming such as brushing teeth?: Total  -   Eating meals?: Total    AM-PAC Score:  Score: 6  Approx Degree of Impairment: 100%  Standardized Score (AM-PAC Scale): 17.07  CMS Modifier (G-

## 2018-08-10 NOTE — CM/SW NOTE
SW met w/ pt and pt's  to follow up on discharge planning. Pt/ requesting rehab. Pt/ requesting [facility] again. Sw discussed the possibility of insurance not approving rehab stay, as pt was recently at rehab.   stated plan would b

## 2018-08-11 NOTE — PHYSICAL THERAPY NOTE
Orders received and chart reviewed. Per EMR and ANTONIO Smith, pt is primarily bed bound and is dependent with all care. PT services not appropriate. Will discontinue orders for PT. Thank you.

## 2018-08-11 NOTE — PLAN OF CARE
Problem: Patient/Family Goals  Goal: Patient/Family Long Term Goal  Patient's Long Term Goal: Get rid of this pneumonia     Interventions:  - Administer antibiotics  - See additional Care Plan goals for specific interventions    Outcome: [address] for assistance with activity based on assessment  - Modify environment to reduce risk of injury  - Provide assistive devices as appropriate  - Consider OT/PT consult to assist with strengthening/mobility  - Encourage toileting schedule   Outcome: Progressi

## 2018-08-11 NOTE — PROGRESS NOTES
[place] [facility] - [facility]    Progress Note    Celso Frankel Patient Status:  Inpatient    1945 MRN [id number]   Location [facility] 5SW/SE Attending Leydi Velazquez Carla Day # 2 PCP Brandon Vidales DO        Subjective: spine with torticollis to the left:  Unfortunately, this makes her ability to change positions in bed and to ambulate very difficult and predisposed to decubitus ulcers.  We will continue Santyl and foam dressing, have asked Wound Care for evaluation and p 09/28/2017   B12 927 (H) 10/02/2017                        Jf Styles MD  8/11/2018

## 2018-08-12 NOTE — PROGRESS NOTES
[place] [facility] - [facility]    Progress Note    Naima Minor Patient Status:  Inpatient    1945 MRN [id number]   Location [facility] 5SW/SE Attending Leydi Velazquez Day # 3 PCP Olu Andres DO        Subjective: kyphosis of the cervical and thoracic spine with torticollis to the left:  Unfortunately, this makes her ability to change positions in bed and to ambulate very difficult and predisposed to decubitus ulcers.  We will continue Santyl and foam dressing, have Xr Chest Ap Portable  (cpt=71045)    Result Date: 8/12/2018  CONCLUSION: No acute cardiopulmonary abnormality. Chronic interstitial changes suggesting fibrosis/scar.   Demineralized osseous structures with numerous compression deformities and kyphopl

## 2018-08-12 NOTE — PLAN OF CARE
Problem: Patient/Family Goals  Goal: Patient/Family Long Term Goal  Patient's Long Term Goal: Get rid of this pneumonia     Interventions:  - Administer antibiotics  - See additional Care Plan goals for specific interventions    Outcome: [address] devices as appropriate  - Consider OT/PT consult to assist with strengthening/mobility  - Encourage toileting schedule   Outcome: Progressing  Makes no attempt to get out of bed.

## 2018-08-12 NOTE — PROGRESS NOTES
[facility] dosing service    Follow-up Pharmacokinetic Consult for Vancomycin Dosing     Jackelyn Alcaraz is a 74year old female who is being treated for pneumonia. Patient is on day 4 of Vancomycin 750 mg IV Q 24 hours. Goal trough is 15-20 ug/mL. result)   Collection Time: 08/09/18 2:14 AM   Result Value Ref Range   Blood Culture Result No Growth 2 Days N/A       Radiology:    Based on the above:    1.  Increase Vancomycin at 1 gm IVPB Q 12 hours (based on Trough of 3.5 ug/mL, pharmacokinetics, 54.2

## 2018-08-12 NOTE — PLAN OF CARE
Problem: Patient/Family Goals  Goal: Patient/Family Long Term Goal  Patient's Long Term Goal: Get rid of this pneumonia     Interventions:  - Administer antibiotics  - See additional Care Plan goals for specific interventions    Outcome: [address] as appropriate  - Consider OT/PT consult to assist with strengthening/mobility  - Encourage toileting schedule   Outcome: Progressing      Problem: Patient Centered Care  Goal: Patient preferences are identified and integrated in the patient's plan of care

## 2018-08-13 NOTE — CM/SW NOTE
SW left VM for pt's insurance in regards to pt and  requesting referral to [facility]. Per PT/OT notes, pt is at baseline and not appropriate for therapy needs at this time. Because of this, it is likely pt will not meet criteria for SNF.

## 2018-08-13 NOTE — PLAN OF CARE
Problem: Patient/Family Goals  Goal: Patient/Family Short Term Goal  Patient's Short Term Goal: Stop coughing     Interventions:   - Monitor vitals  - Administer antibiotics   - See additional Care Plan goals for specific interventions    Outcome: Progress integrated in the patient's plan of care  Interventions:  - What would you like us to know as we care for you?  My  cares for me   - Provide timely, complete, and accurate information to patient/family  - Incorporate patient and family knowledge, stacy

## 2018-08-13 NOTE — SLP NOTE
SPEECH DAILY NOTE - INPATIENT    ASSESSMENT & PLAN   ASSESSMENT  Patient repositioned upright 90 degrees in bed for meal assessment and training of swallow compensatory strategies and aspiration precautions.    Patient's diet advanced to mechanical soft sol liquids without overt signs or symptoms of aspiration with 100 % accuracy over 2 session(s). Pt tolerates mechanical soft/ground solids and nectar thickened liquids with no clinical signs of aspiration for 100% of trials.     MET             Goal updated

## 2018-08-13 NOTE — PROGRESS NOTES
[place] [facility] - [facility]    Progress Note    Rita Wade Patient Status:  Inpatient    1945 MRN [id number]   Location [facility] 5SW/SE Attending Leydi Velazquez Day # 4 PCP Christina Milian DO        Subjective: this makes her ability to change positions in bed and to ambulate very difficult and predisposed to decubitus ulcers.  We will continue Santyl and foam dressing, have asked Wound Care for evaluation and possible special bed for the patient.   5.       Kaylee abnormality. Chronic interstitial changes suggesting fibrosis/scar. Demineralized osseous structures with numerous compression deformities and kyphoplasty changes in the spine.    Dictated by (CST): Yuly Rizvi MD on 8/12/2018 at 14:29     Approved by (C

## 2018-08-13 NOTE — OCCUPATIONAL THERAPY NOTE
Patient initially put on trial for occupational therapy, however, after reviewing PT note, it was found that patient is primarily bedbound; patient does require assistance for all BADL tasks; she was dependent for tasks presented at initial evaluation; tri

## 2018-08-13 NOTE — PLAN OF CARE
Problem: Patient/Family Goals  Goal: Patient/Family Long Term Goal  Patient's Long Term Goal: Get rid of this pneumonia     Interventions:  - Administer antibiotics  - See additional Care Plan goals for specific interventions    Outcome: [address] as appropriate  - Consider OT/PT consult to assist with strengthening/mobility  - Encourage toileting schedule   Outcome: Progressing      Problem: Patient Centered Care  Goal: Patient preferences are identified and integrated in the patient's plan of care

## 2018-08-14 NOTE — PLAN OF CARE
Problem: Patient/Family Goals  Goal: Patient/Family Long Term Goal  Patient's Long Term Goal: Get rid of this pneumonia     Interventions:  - Administer antibiotics  - See additional Care Plan goals for specific interventions    Outcome: [address] as appropriate  - Consider OT/PT consult to assist with strengthening/mobility  - Encourage toileting schedule   Outcome: Progressing      Problem: Patient Centered Care  Goal: Patient preferences are identified and integrated in the patient's plan of care

## 2018-08-14 NOTE — PROGRESS NOTES
[place] [facility] - [facility]    Progress Note    Rita Wade Patient Status:  Inpatient    1945 MRN [id number]   Location [facility] 5SW/SE Attending Leydi Velazquez Day # 5 PCP Christina Milian DO        Subjective: kyphosis of the cervical and thoracic spine with torticollis to the left:  Unfortunately, this makes her ability to change positions in bed and to ambulate very difficult and predisposed to decubitus ulcers.  We will continue Santyl and foam dressing, have

## 2018-08-14 NOTE — PLAN OF CARE
Problem: Patient/Family Goals  Goal: Patient/Family Short Term Goal  Patient's Short Term Goal: Stop coughing     Interventions:   - Monitor vitals  - Administer antibiotics   - See additional Care Plan goals for specific interventions    Outcome: Progress Instruct pt to call for assistance with activity based on assessment  - Modify environment to reduce risk of injury  - Provide assistive devices as appropriate  - Consider OT/PT consult to assist with strengthening/mobility  - Encourage toileting schedule

## 2018-08-15 NOTE — WOUND PROGRESS NOTE
Wound care follow up    Pt up in chair, contact isolation maintained, pt  returned with lift and pct help, pt with healing right lower arm skin tear, xeroform and aquacel placed, Pt with pressure ulcer to sacrum  90% slough,with slight black DTI area,  wou

## 2018-08-15 NOTE — PROGRESS NOTES
[facility]    Progress Note    Mikhail Bello Patient Status:  Inpatient    1945 MRN [id number]   Location [facility] 5SW/SE Attending Hector Mckeon, [address] [facility] Day # 6 PCP Harika Crowell DO       Subjective:   Vida Elliott in sodium chloride 0.9% 100 mL  mg Intravenous Q8H   Vancomycin HCl (FIRVANQ) 50 MG/ML oral solution 125 mg 125 mg Oral Q6H   acetaminophen (TYLENOL EXTRA STRENGTH) tab 500 mg 500 mg Oral Q6H PRN         Lab Results  Component Value Date   WBC 15.6 Zosyn and Zithromax during that hospital stay.  Cont on meropenem and vancomycin and nebulizer treatments.  May need vibratory vest/chest pt/acapella   ekg and cxr ok but I>O and weight up will give lasix retaining 200 ml urine  Had doan for I and o accura

## 2018-08-15 NOTE — PLAN OF CARE
Problem: Patient/Family Goals  Goal: Patient/Family Long Term Goal  Patient's Long Term Goal: Get rid of this pneumonia     Interventions:  - Administer antibiotics  - See additional Care Plan goals for specific interventions    Outcome: [address] as appropriate  - Consider OT/PT consult to assist with strengthening/mobility  - Encourage toileting schedule   Outcome: Progressing      Problem: Patient Centered Care  Goal: Patient preferences are identified and integrated in the patient's plan of care

## 2018-08-15 NOTE — SLP NOTE
SPEECH DAILY NOTE - INPATIENT    ASSESSMENT & PLAN   ASSESSMENT  Patient seen to monitor tolerance of PO. Patient was sitting in chair and was in great pain and refused all PO except pain medication in puree and nectar thick liquid to wash it down.   Farzana and swallow strategies independently over 2 session(s).     Reviewed swallow strategies and  aspiration precautions with the patient and . Patient verbalized understanding.    In Progress   Goal #3 The patient will tolerate trial upgrade of solid con

## 2018-08-15 NOTE — PLAN OF CARE
Problem: Patient/Family Goals  Goal: Patient/Family Short Term Goal  Patient's Short Term Goal: Stop coughing     Interventions:   - Monitor vitals  - Administer antibiotics   - See additional Care Plan goals for specific interventions    Outcome: Progress on patient safety including physical limitations  - Instruct pt to call for assistance with activity based on assessment  - Modify environment to reduce risk of injury  - Provide assistive devices as appropriate  - Consider OT/PT consult to assist with str

## 2018-08-16 NOTE — SLP NOTE
SPEECH DAILY NOTE - INPATIENT    ASSESSMENT & PLAN   ASSESSMENT    Pt seen upright in chair with oral trials of pureed and nectar-thick liquids for monitoring of diet tolerance.  Pt declined mechanical soft consistency at this time (\"not hungry\")   Rani mechanical soft/ground consistency and nectar liquids without overt signs or symptoms of aspiration with 100 % accuracy over 3 session(s).     No overt clinical signs/symptoms of aspiration on any swallows of pureed consistency (no coughing, no throat clear

## 2018-08-16 NOTE — PLAN OF CARE
Problem: Patient/Family Goals  Goal: Patient/Family Long Term Goal  Patient's Long Term Goal: Get rid of this pneumonia     Interventions:  - Administer antibiotics  - See additional Care Plan goals for specific interventions    Outcome: [address] Modify environment to reduce risk of injury  - Provide assistive devices as appropriate  - Consider OT/PT consult to assist with strengthening/mobility  - Encourage toileting schedule   Outcome: Progressing  Max assist, call light within reach, frequent ro

## 2018-08-16 NOTE — TELEPHONE ENCOUNTER
Tarah from [address] [employer] is calling states she faxed over a referral that needs an extension for pt's wheelchair. Please sign and faxed back to [phone].

## 2018-08-16 NOTE — DIETARY NOTE
ADULT NUTRITION REASSESSMENT     Pt is at moderate nutrition risk. Pt does not meet malnutrition criteria.       RECOMMENDATIONS TO MD:     NUTRITION DIAGNOSIS/PROBLEM:  Increased nutrient needs related to increased needs in wound healing, as evidenced by 97-98#) 3% wt gain likely due to fluids. BMI: Body mass index is 18.39 kg/m². BMI CLASSIFICATION: 19-24.9 kg/m2 - WNL  IBW: 115 lbs        100 % IBW  Usual Body Wt: 100 lbs       116 % UBW    WEIGHT HISTORY:  Patient Weight(s) for the past 336 hrs:    We [address]   Clinical Dietitian  [phone]

## 2018-08-16 NOTE — TELEPHONE ENCOUNTER
Manage Care,   I will be faxing over paperwork [facility] [employer] referral request for extension for pts wheelchair. Please keep a look out.  Thanks

## 2018-08-16 NOTE — CM/SW NOTE
Addend 436p:     SW spoke w/ pt's , Grace Rodriguez regarding eventual discharge plans. SW informed pt's  that pt does not meet criteria from rehab.  Pt's  stated that it has become difficult to care for pt at home in which he wanted pt to return PT/OT services not appropriate. CHARANJIT paged Dr. Warner Packer w/ [employer] appeal contact number. ----------------------------------------------------------------------------  CHARANJIT left a voicemail for [employer] regarding insurance authorization.  CHARANJIT spoke w/ Dr. Luis Hay

## 2018-08-16 NOTE — PLAN OF CARE
Problem: Patient/Family Goals  Goal: Patient/Family Long Term Goal  Patient's Long Term Goal: Get rid of this pneumonia     Interventions:  - Administer antibiotics  - See additional Care Plan goals for specific interventions    Outcome: Progressing  cough physical limitations  - Instruct pt to call for assistance with activity based on assessment  - Modify environment to reduce risk of injury  - Provide assistive devices as appropriate  - Consider OT/PT consult to assist with strengthening/mobility  - Encou

## 2018-08-16 NOTE — PROGRESS NOTES
[facility]    Progress Note    Jackelyn Alcaraz Patient Status:  Inpatient    1945 MRN [id number]   Location [facility] 5SW/SE Attending Jennifer Gutierrez, [address] Day # 7 PCP Filipe Marion, DO       Subjective:   Yane Lin 500 mg Oral Q6H PRN         Lab Results  Component Value Date   WBC 12.2 (H) 08/16/2018   HGB 11.5 (L) 08/16/2018   HCT 34.4 (L) 08/16/2018    (H) 08/16/2018   CREATSERUM 0.37 (L) 08/16/2018   BUN 12 08/16/2018    08/16/2018   K 3.8 08/16/2018 home. Repeat CXR stable   - repeat lasix 20 iv x 1   - She has recently been hospitalized and was Zosyn and Zithromax during that hospital stay. Marlea Grills on meropenem and vancomycin and nebulizer treatments.  ekg and cxr ok but I>O and weight up will give las

## 2018-08-17 NOTE — PROGRESS NOTES
Patient has been encouraged to eat this am and afternoon,but patient refused to eat. other food option was given to patient but patient refused to reorder. Will keep movtivate and encourage patient to eat more.

## 2018-08-17 NOTE — PLAN OF CARE
Problem: Patient/Family Goals  Goal: Patient/Family Long Term Goal  Patient's Long Term Goal: Get rid of this pneumonia     Interventions:  - Administer antibiotics  - See additional Care Plan goals for specific interventions    Outcome: [address] as appropriate  - Consider OT/PT consult to assist with strengthening/mobility  - Encourage toileting schedule   Outcome: Progressing      Problem: Patient Centered Care  Goal: Patient preferences are identified and integrated in the patient's plan of care

## 2018-08-17 NOTE — PROGRESS NOTES
Patient  Everette Guerra arrived and stated\" Is my wife Jennie Cheadle be discharge Saturday and if so I Juliane Antony make sure she can stand before she leaves tomorrow\".  I explained to the  I don't feel comfortable standing the patient because we having been using

## 2018-08-17 NOTE — SLP NOTE
SPEECH DAILY NOTE - INPATIENT    ASSESSMENT & PLAN   ASSESSMENT    Pt seen upright in chair with current diet of mechanical soft/nectar-thick liquids (breakfast tray) for monitoring of diet tolerance.  Swallowing precautions/strategies discussed and demonst Recommendations: Crushed in puree    Patient Experiencing Pain: No    Discharge Recommendations  Discharge Recommendations/Plan: Undetermined    Treatment Plan  Treatment Plan/Recommendations: Aspiration precautions    Interdisciplinary Communication: Disc cues required for multiple swallows. In Progress       FOLLOW UP  Follow Up Needed: Yes  SLP Follow-up Date: 08/18/18  Number of Visits to Meet Established Goals: 5    Session:4     If you have any questions, please contact       ROSMERY Avery

## 2018-08-17 NOTE — CM/SW NOTE
Addend 526p:     DISCHARGE PLAN:   - Discharge to [facility], respite care/private pay on Saturday 8/18.   - SW will arrange ambulance transport (complete, 2LO2)    Pt's  asked to speak w/ SW regarding discharge plan.  Pt's  stated that he had th ----------------------------------------------------------------------------  Addend 151p:     SW spoke w/ Roxanne Lombardo from [facility] [phone] who stated she spoke w/ pt's , Tracy Barrow and stated she will be at the [facility] for evaluation.    ---

## 2018-08-17 NOTE — TELEPHONE ENCOUNTER
[facility] requesting signature for teatment order update change in condition, faxed on 8/14/18, fax was successful

## 2018-08-17 NOTE — PROGRESS NOTES
[facility]    Progress Note    Wong Stage Patient Status:  Inpatient    1945 MRN [id number]   Location [facility] 5SW/SE Attending Andrew Arce, [address] Day # 8 PCP Jacklyn Whalen DO       Subjective:   Nael Ramirez tab 500 mg 500 mg Oral Q6H PRN         Lab Results  Component Value Date   WBC 11.9 (H) 08/17/2018   HGB 10.7 (L) 08/17/2018   HCT 32.3 (L) 08/17/2018    08/17/2018   CREATSERUM 0.40 (L) 08/17/2018   BUN 13 08/17/2018    08/17/2018   K 4.4 08/ vancomycin and nebulizer treatments. Day #9/10 abx   2.       Pneumonia with sepsis:  The patient received fluid bolus in the emergency room. 3.       Leukocytosis secondary to pneumoniia sepsis:  We will continue to monitor.  Improved   4.       Severe k

## 2018-08-18 NOTE — PROGRESS NOTES
[facility]    [facility]    Naima Minor Patient Status:  Inpatient    1945 MRN [id number]   Location [facility] 5SW/SE Attending Pinky Ewing, [address] Day # 9 PCP Olu Andres DO       Subjective:   Dalila Figueroa STRENGTH) tab 500 mg 500 mg Oral Q6H PRN         Lab Results  Component Value Date   WBC 14.3 (H) 08/18/2018   HGB 10.6 (L) 08/18/2018   HCT 32.6 (L) 08/18/2018    08/18/2018   CREATSERUM 0.42 (L) 08/18/2018   BUN 11 08/18/2018    (L) 08/18/20 patient received fluid bolus in the emergency room. 3.       Leukocytosis secondary to pneumoniia sepsis:  We will continue to monitor. Highter today.  Repeat in am   4.       Severe kyphosis of the cervical and thoracic spine with torticollis to the left

## 2018-08-18 NOTE — PLAN OF CARE
Problem: Patient/Family Goals  Goal: Patient/Family Long Term Goal  Patient's Long Term Goal: Get rid of this pneumonia     Interventions:  - Administer antibiotics  - See additional Care Plan goals for specific interventions    Outcome: [address] as appropriate  - Consider OT/PT consult to assist with strengthening/mobility  - Encourage toileting schedule   Outcome: Progressing      Problem: Patient Centered Care  Goal: Patient preferences are identified and integrated in the patient's plan of care

## 2018-08-18 NOTE — CM/SW NOTE
Addend [phone]:     Pt's  called CHARANJIT and stated that pt is not doing well and does not want pt to discharge today. Pt's  stated that pt is discharging too soon.  CHARANJIT informed CT/Lizbet who will bring IM and discuss appeal. CHARANJIT cancelled ambulance an

## 2018-08-18 NOTE — PLAN OF CARE
Problem: Patient/Family Goals  Goal: Patient/Family Long Term Goal  Patient's Long Term Goal: Get rid of this pneumonia     Interventions:  - Administer antibiotics  - See additional Care Plan goals for specific interventions    Outcome: Na Vera based on assessment  - Modify environment to reduce risk of injury  - Provide assistive devices as appropriate  - Consider OT/PT consult to assist with strengthening/mobility  - Encourage toileting schedule   Outcome: Progressing  Does not try to get out o

## 2018-08-18 NOTE — CM/SW NOTE
CTL met with patients  at bedside to discuss discharge.  Patients  inquiring about appealing the discharge order as written on the [employer] Message from Medicare    Patients  called [facility] [phone] and filed an appeal    Case numb

## 2018-08-19 NOTE — PLAN OF CARE
Problem: Patient/Family Goals  Goal: Patient/Family Long Term Goal  Patient's Long Term Goal: Get rid of this pneumonia     Interventions:  - Administer antibiotics  - See additional Care Plan goals for specific interventions    Outcome: [address] as appropriate  - Consider OT/PT consult to assist with strengthening/mobility  - Encourage toileting schedule   Outcome: Progressing      Problem: Patient Centered Care  Goal: Patient preferences are identified and integrated in the patient's plan of care

## 2018-08-19 NOTE — PROGRESS NOTES
[facility]    Progress Note    [facility] Patient Status:  Inpatient    1945 MRN [id number]   Location [facility] 5SW/SE Attending Humaira Willingham, [address] [facility] Day # 10 PCP Kris Martínez DO       Subjective:   Jinger Knee (L) 08/19/2018   HCT 31.4 (L) 08/19/2018    08/19/2018   CREATSERUM 0.43 (L) 08/19/2018   BUN 11 08/19/2018    08/19/2018   K 4.1 08/19/2018   K 4.1 08/19/2018    08/19/2018   CO2 30 08/19/2018   GLU 96 08/19/2018   CA 8.8 08/19/2018   A cervical and thoracic spine with torticollis to the left:  Unfortunately, this makes her ability to change positions in bed and to ambulate very difficult and predisposed to decubitus ulcers.  We will continue Santyl and foam dressing, have asked Wound Car

## 2018-08-20 NOTE — SLP NOTE
SPEECH DAILY NOTE - INPATIENT    ASSESSMENT & PLAN   ASSESSMENT  Pt repositioned upright 90 degrees in bed for meal assessment and training of swallow compensatory strategies/ swallow precautions. SLP reviewed no straw use with RN.   RN reports pt tolerate #2 The patient/family/caregiver will demonstrate understanding and implementation of aspiration precautions and swallow strategies independently over 5 session(s).     Reviewed with patient and RN. RN verbalized good understanding.      GOAL MET    Goal #3

## 2018-08-20 NOTE — CM/SW NOTE
Received call from [email or web address] stating determination per Medicare physician; disagreed w/discharge, pt to remain admitted and continue with services. Voicemail left to Thony Diamond 22 with the above determination.

## 2018-08-20 NOTE — CM/SW NOTE
Follow up with [facility] [phone] appeal    Final is still pending expected to have decision this afternoon    Kayla Hinojosa, [address] [facility]  Phone # [phone]

## 2018-08-20 NOTE — PLAN OF CARE
SKIN/TISSUE INTEGRITY - ADULT    • Skin integrity remains intact Not Progressing        See full skin assessment. Patient on air mattress. Blue boots bilaterally. Turning q2h and prn.    PAIN - ADULT    • Verbalizes/displays adequate comfort level or patien

## 2018-08-20 NOTE — PROGRESS NOTES
[facility]    Progress Note    Tala Dixon Patient Status:  Inpatient    1945 MRN [id number]   Location [facility] 5SW/SE Attending Juliette Schultz, [address] [facility] Day # 11 PCP Elizabeth Don DO       Subjective:   Tamanna Natarajan CREATSERUM 0.43 (L) 08/20/2018   BUN 10 08/20/2018    (L) 08/20/2018   K 3.7 08/20/2018   CL 98 08/20/2018   CO2 30 08/20/2018    (H) 08/20/2018   CA 8.9 08/20/2018   ALB 2.4 (L) 08/10/2018   ALKPHO 69 08/10/2018   BILT 0.7 08/10/2018   TP 5 ability to change positions in bed and to ambulate very difficult and predisposed to decubitus ulcers.  We will continue Santyl and foam dressing, have asked Wound Care for evaluation and possible special bed for the patient.   5.       Liquid mucous stools

## 2018-08-21 NOTE — CM/SW NOTE
Luanne spoke with [employer]  [phone] regarding final determination     Per [address] Selene Barboza with discharge d/t active issue with diarrhea at the time of appeal, would recommend extending the inpatient hospital stay by 2 days then reevaluate    Estuardo Keenan

## 2018-08-21 NOTE — PROGRESS NOTES
[facility]    Progress Note    Isis Booker Patient Status:  Inpatient    1945 MRN [id number]   Location [facility] 5SW/SE Attending Barber Umana, [address] [facility] Day # 12 PCP Regine Mari DO       Subjective:   Dileep Hirsch 08/21/2018   CREATSERUM 0.40 (L) 08/21/2018   BUN 17 08/21/2018    08/21/2018   K 4.1 08/21/2018   CL 99 08/21/2018   CO2 32 08/21/2018    (H) 08/21/2018   CA 9.3 08/21/2018   ALB 2.4 (L) 08/10/2018   ALKPHO 69 08/10/2018   BILT 0.7 08/10/2018 off iv abx - Received 10 days   2.       Pneumonia with sepsis:  The patient received fluid bolus in the emergency room. 3.       Leukocytosis secondary to pneumonia/sepsis. WBC 14. May be 2/2 wound. Have asked wound care to see.  May need surgical debrid

## 2018-08-22 NOTE — PLAN OF CARE
Problem: Patient/Family Goals  Goal: Patient/Family Long Term Goal  Patient's Long Term Goal: Get rid of this pneumonia     Interventions:  - Administer antibiotics  - See additional Care Plan goals for specific interventions    Outcome: Progressing  CXR g physical limitations  - Instruct pt to call for assistance with activity based on assessment  - Modify environment to reduce risk of injury  - Provide assistive devices as appropriate  - Consider OT/PT consult to assist with strengthening/mobility  - Encou integrity  - Monitor for areas of redness and/or skin breakdown  - Initiate interventions, skin care algorithm/standards of care as needed   Outcome: Progressing  Wound care per MD orders; Air mattress on; Turning q2;  Bunny boots applied    Problem: GASTRO

## 2018-08-22 NOTE — PROGRESS NOTES
[facility]    Progress Note    Gracielaalicia Calvillo Patient Status:  Inpatient    1945 MRN [id number]   Location [facility] 5SW/SE Attending Leydi Velazquez Day # 13 PCP Lauryn John DO        Subjective: dressing, have asked Wound Care for evaluation and possible special bed for the patient. 5.       Liquid mucous stools, c  Diff pos now better  - cont oral vanco  Day #13. Will stop today.  Pt also with nausea, likely from vanco   - repeat cbc in am   7.   but overall stable. Mild hyperinflation. No acute airspace disease. Correlate clinically.      Dictated by (CST): Tammy Daly MD on 8/20/2018 at 15:28     Approved by (CST): Tammy Daly MD on 8/20/2018 at 15:33                       Bhupendra Link

## 2018-08-22 NOTE — PLAN OF CARE
Problem: Patient/Family Goals  Goal: Patient/Family Long Term Goal  Patient's Long Term Goal: Get rid of this pneumonia     Interventions:  - Administer antibiotics  - See additional Care Plan goals for specific interventions    Outcome: [address] devices as appropriate  - Consider OT/PT consult to assist with strengthening/mobility  - Encourage toileting schedule   Outcome: Progressing      Problem: RESPIRATORY - ADULT  Goal: Achieves optimal ventilation and oxygenation  INTERVENTIONS:  - Assess fo

## 2018-08-22 NOTE — WOUND PROGRESS NOTE
Wound Care Services  Follow up on the pt's sacral ulcer, the pt.  is on the 1st step over air mattress, Contact isolation maintained, Dr Chanell Vera is here assessing the pt. also, we assessed the sacral ulcer together, sacrum with unstageable pressure injury

## 2018-08-23 NOTE — CONSULTS
[facility] ID CONSULT NOTE    Celso Frankel Patient Status:  Inpatient    1945 MRN [id number]   [facility] 5SW/SE Attending Leydi Velazquez Carla Day # 15 PCP Brandon Vidales DO       Re EXTRACTION  1-: COLONOSCOPY & POLYPECTOMY  No date: FRACTURE SURGERY      Comment: LEFT LEG   01/16/2013: IR KYPHOPLASTY      Comment: Kyphoplasty, L-3 & T-10  2008: UPPER GI ENDOSCOPY PERFORMED      Comment: done by dr.[facility] Keyla Medina  Family History   Pr vision or yellow sclerae. Ears, Nose, Throat:  No hearing loss, sneezing, congestion, runny nose or sore throat. SKIN:  No rash or itching.   CARDIOVASCULAR:  No chest pain, chest pressure or chest discomfort  RESPIRATORY:  No shortness of breath, cough or wound, with recent hospitalization 7/17-7/20 with aspiration pneumonia, was on zosyn, discharged on augmentin which she continued at rehab, discharged from rehab 8/4, who presented to the [facility] ED on 8/9 with weakness and fatigue, afebrile, wbc 14.8 with 18% Consultants  [phone]  8/23/2018

## 2018-08-23 NOTE — PROGRESS NOTES
[facility]    Progress Note    José Miguel Chun Patient Status:  Inpatient    1945 MRN [id number]   Location [facility] 5SW/SE Attending Leydi Velazquez Carla Day # 14 PCP Fatemeh Salmon DO        Subjective: for the patient. 5.       Liquid mucous stools, c  Diff pos now better  6. Severe protein dirk maln temp wasting and bmi=18.02  7.       DVT prophylaxis:  Subcutaneous heparin. 8.       Hypomagnesemia:  Replace per protocol.   9.       GI prophylaxis suggest osteomyelitis. Severe osteopenia with extensive kyphoscoliosis of the thoracolumbar spine with multiple prior kyphoplasty. Bilateral lower lobe air space opacities suggestive of atelectasis.  Underlying pneumonia is felt to be less likely but is a

## 2018-08-23 NOTE — PLAN OF CARE
GASTROINTESTINAL - ADULT    • Maintains or returns to baseline bowel function Progressing        PAIN - ADULT    • Verbalizes/displays adequate comfort level or patient's stated pain goal Progressing        Patient Centered Care    • Patient preferences ar

## 2018-08-23 NOTE — PLAN OF CARE
Tele notified RN of heart rate in 130's patient was getting cleaned up from bowel movement. Tap water enema was started at time of phone call. Report given to [facility]. Updated on above events, will monitor heart rate.

## 2018-08-23 NOTE — DIETARY NOTE
ADULT NUTRITION REASSESSMENT     Pt is at moderate nutrition risk. Pt does not meet malnutrition criteria. RECOMMENDATIONS TO MD: None at this time.      CRITERIA FOR MALNUTRITION DIAGNOSIS:  Criteria for non-severe malnutrition diagnosis: chronic ill affected intake. However, wt has been surprisingly same/stable. NUTRITION INTERVENTION:  - Meals and snacks: Arranged snacks, Modified diet for preferences and Encouraged increased PO intake   - Medical Food Supplements - Will continue.  Zulay Yancey depletion body fat and mild depletion muscle mass   - Fluid Accumulation: no edema noted. - Skin Integrity: breakdown. Sacral with unstageable pressure ulcer and R heel deep tissue injury. Wound Services on case.    - Papi score 12    NUTRITION PRESCRIPT

## 2018-08-23 NOTE — PLAN OF CARE
Problem: Patient/Family Goals  Goal: Patient/Family Long Term Goal  Patient's Long Term Goal: Get rid of this pneumonia     Interventions:  - Administer antibiotics  - See additional Care Plan goals for specific interventions    Outcome: Progressing  PNA r on patient safety including physical limitations  - Instruct pt to call for assistance with activity based on assessment  - Modify environment to reduce risk of injury  - Provide assistive devices as appropriate  - Consider OT/PT consult to assist with str Assess and document skin integrity  - Monitor for areas of redness and/or skin breakdown  - Initiate interventions, skin care algorithm/standards of care as needed   Outcome: Progressing  Dressings being done per MD orders; Turning patient;  Air mattress in

## 2018-08-24 NOTE — PLAN OF CARE
Problem: Patient/Family Goals  Goal: Patient/Family Long Term Goal  Patient's Long Term Goal: Get rid of this pneumonia     Interventions:  - Administer antibiotics  - See additional Care Plan goals for specific interventions    Outcome: [address] Provide assistive devices as appropriate  - Consider OT/PT consult to assist with strengthening/mobility  - Encourage toileting schedule   Outcome: Progressing  Bed locked/lowest position. Bed alarm on. Call light within reach. Frequent rounding done. as needed   Outcome: Progressing  Prompt incontinence care provided. Dressings clean, dry, intact. Air mattress maintained. Turn q 2 hours.      Problem: GASTROINTESTINAL - ADULT  Goal: Maintains or returns to baseline bowel function  INTERVENTIONS:  - Asse

## 2018-08-24 NOTE — PROGRESS NOTES
[place] [facility]    Progress Note    Jackelyn Alcaraz Patient Status:  Inpatient    1945 MRN [id number]   Location [facility] 5SW/SE Attending Leydi Velazquez Day # 15 PCP Filipe Marion, DO        Subjective: Wound Care for evaluation and possible special bed for the patient. 5.       Liquid mucous stools, c  Diff pos now better  6. Severe protein dirk maln temp wasting and bmi=18.02  7.       DVT prophylaxis:  Subcutaneous heparin.   8.       Hypomagnesem resolution is advised to exclude underlying mass. 2. Osseous demineralization with chronic multilevel thoracolumbar compression fractures, many with kyphoplasty cement.  Focal kyphosis at T6.  3. Wall thickening of the mid to distal esophagus, with large h

## 2018-08-24 NOTE — PROGRESS NOTES
[facility] ID PROGRESS NOTE    Simran Calvillo Patient Status:  Inpatient    1945 MRN [id number]   Location [facility] 5SW/SE Attending Caroline, [facility] Day # 15 PCP Markell Griffin DO     Subjective:  Awake, afebrile.  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     # Increasing leukocytosis with no fevers, ? possibly reactive to constipation/fecal impaction, sacral wound with no active s/s of infection, no clear focal symptoms, urine cx NGTD  # Acute C.  Difficile colitis, positive on 8/9, completed

## 2018-08-25 NOTE — PROGRESS NOTES
[facility] ID PROGRESS NOTE    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 States that she is feeling better.  ID consulted.     # Increasing leukocytosis with no fevers, ? possibly reactive to constipation/fecal impaction, sacral wound with no active s/s of infection, no clear focal symptoms, urine cx NGTD, decreased w/o

## 2018-08-25 NOTE — PLAN OF CARE
GASTROINTESTINAL - ADULT    • Maintains or returns to baseline bowel function Progressing          PAIN - ADULT    • Verbalizes/displays adequate comfort level or patient's stated pain goal Progressing          Patient Centered Care    • Patient preference

## 2018-08-25 NOTE — PROGRESS NOTES
[place] [facility] - [facility]    Progress Note    Charissa Alves Patient Status:  Inpatient    1945 MRN [id number]   Location [facility] 3W/SW Attending Leydi Velazquez Carla Day # 16 PCP Diane Carroll DO        Subjective: possible special bed for the patient. 5.       Liquid mucous stools, c  Diff pos now better  6.       Severe protein dirk maln temp wasting and bmi=18.02  7.       DVT prophylaxis:  Subcutaneous heparin. 8.       Hypomagnesemia:  Replace per protocol.   9. Atherosclerosis. 5. Hyperinflation. Dictated by (CST): Livia Stacy MD on 8/25/2018 at 9:53     Approved by (CST): Livia Stacy MD on 8/25/2018 at 9:57          Ct Chest (nzg=[id number])    Result Date: 8/24/2018  CONCLUSION:   1.  Emphysema with l

## 2018-08-25 NOTE — PLAN OF CARE
Problem: Patient/Family Goals  Goal: Patient/Family Long Term Goal  Patient's Long Term Goal: Get rid of this pneumonia     Interventions:  - Administer antibiotics  - See additional Care Plan goals for specific interventions    Outcome: [address] Maintain adequate hydration with IV or PO as ordered and tolerated  - Evaluate effectiveness of GI medications  - Encourage mobilization and activity  - Obtain nutritional consult as needed  - Establish a toileting routine/schedule  - Consider collaboratin

## 2018-08-26 NOTE — PROGRESS NOTES
[facility]    Progress Note    Meche Holloway Patient Status:  Inpatient    1945 MRN [id number]   [facility] 3W/SW Attending Leydi Velazquez Day # 16 PCP Price Fair DO        Subjective: ulcers.  We will continue Santyl and foam dressing, have asked Wound Care for evaluation and possible special bed for the patient.   5.       Liquid mucous stools, c  Diff pos now better  6.       Severe protein dirk maln temp wasting and bmi=18.02  7.       22, 2018. Underlying pneumonia? 2. The rest of the findings are stable. 3. Cardiomegaly. 4. Atherosclerosis. 5. Hyperinflation. Dictated by (CST): Bruna Rivera MD on 8/25/2018 at 9:53     Approved by (CST):  Bruna Rivera MD on 8/25/2018 at 9:5

## 2018-08-26 NOTE — PLAN OF CARE
Problem: PAIN - ADULT  Goal: Verbalizes/displays adequate comfort level or patient's stated pain goal  INTERVENTIONS:  - Encourage pt to monitor pain and request assistance  - Assess pain using appropriate pain scale  - Administer analgesics based on type and cultural backgrounds into the planning and delivery of care  - Encourage patient/family to participate in care and decision-making at the level they choose  - Honor patient and family perspectives and choices    Outcome: Progressing      Problem: RESPI

## 2018-08-27 NOTE — PROGRESS NOTES
INFECTIOUS DISEASE PROGRESS NOTE    Flori Small Patient Status:  Inpatient    1945 MRN [id number]   Location [facility] 5SW/SE Attending Caroline, Leydi Carla Day # 25 PCP Margie Fajardo DO     Subjective:  No acute events partial remission (HCC)     Protein-calorie malnutrition, mild (HCC)     BMI less than 19,adult     Decubitus ulcer of right heel, stage 1     Kyphosis (acquired) (postural)     Depression     Short-term memory loss     Closed left hip fracture, initial en constipation, given enemas with soft BMs yesterday. Abdominal pain improved today. Patient does not report any SOB, no abdominal pain, no dysuria. States that she is feeling better.  ID consulted.     # Increasing leukocytosis with no fevers, ? possibly hiro

## 2018-08-27 NOTE — PROGRESS NOTES
[facility]    Progress Note    Becca Wong Patient Status:  Inpatient    1945 MRN [id number]   Location [facility] 5SW/SE Attending Leydi Velazquezissa Day # 18 PCP Connie Martin DO        Subjective: but loose stools returned reglan stopped  6.       Severe protein dirk maln temp wasting and bmi=18.02  7.       DVT prophylaxis:  Subcutaneous heparin. 8.       Hypomagnesemia:  Replace per protocol. 9.       GI prophylaxis:  Protonix.   10.     Decubitus

## 2018-08-27 NOTE — PLAN OF CARE
Problem: Patient/Family Goals  Goal: Patient/Family Long Term Goal  Patient's Long Term Goal: Get rid of this pneumonia     Interventions:  - Administer antibiotics  - See additional Care Plan goals for specific interventions    Outcome: Progressing  Patie call for assistance with activity based on assessment  - Modify environment to reduce risk of injury  - Provide assistive devices as appropriate  - Consider OT/PT consult to assist with strengthening/mobility  - Encourage toileting schedule   Outcome: Prog and/or skin breakdown  - Initiate interventions, skin care algorithm/standards of care as needed   Outcome: Progressing  Turns q 2, dressings changed    Problem: GASTROINTESTINAL - ADULT  Goal: Maintains or returns to baseline bowel function  INTERVENTIONS

## 2018-08-28 NOTE — PROGRESS NOTES
[facility] dosing service    Initial Pharmacokinetic Consult for Vancomycin Dosing     Conchita Paulson is a 74year old female admitted on 8/9 who is being treated for pneumonia. Pharmacy has been asked to dose Vancomycin by Dr. Elissa Winston (ID).     She i ROUTINE Status: None   Collection Time: 08/22/18 11:40 AM   Result Value Ref Range   Urine Culture No Growth at 18-24 hrs. N/A       Radiology: cxr (8/25):  Bibasilar lung consolidation/atelectasis/fibrosis appears increased when compared to August 22, 2018

## 2018-08-28 NOTE — PROGRESS NOTES
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 Patient does not report any SOB, no abdominal pain, no dysuria. States that she is feeling better.  ID consulted.     # Increasing leukocytosis with no fevers, ? possibly reactive to constipation/fecal impaction, sacral wound with no active s/s of in

## 2018-08-28 NOTE — PLAN OF CARE
Problem: Patient/Family Goals  Goal: Patient/Family Long Term Goal  Patient's Long Term Goal: Get rid of this pneumonia     Interventions:  - Administer antibiotics  - See additional Care Plan goals for specific interventions    Outcome: [address] as appropriate  - Consider OT/PT consult to assist with strengthening/mobility  - Encourage toileting schedule   Outcome: Progressing      Problem: Patient Centered Care  Goal: Patient preferences are identified and integrated in the patient's plan of care Maintain adequate hydration with IV or PO as ordered and tolerated  - Evaluate effectiveness of GI medications  - Encourage mobilization and activity  - Obtain nutritional consult as needed  - Establish a toileting routine/schedule  - Consider collaboratin

## 2018-08-28 NOTE — BH PROGRESS NOTE
Behavioral Health Note  CHIEF COMPLAINT  Weakness, subjective fever    REASON FOR ADMISSION  Weakness, subjective fever    SOCIAL HISTORY  The patient lives at home with her  and is w/c bound. Her  is her primary caregiver.  The patient has a limited by lethargy  Behavior: lethargic  Speech: minimal  Mood: calm  Conscious/Orientation: responded to name, able to identify she is in the hospital, attention limited    SUICIDAL RISK ASSESSMENT  No SI/HI  ASSESSMENT  The patient has a dx anxiety and

## 2018-08-28 NOTE — PROGRESS NOTES
[facility]    Progress Note    Edi Vee Patient Status:  Inpatient    1945 MRN [id number]   Location [facility] 5SW/SE Attending Leydi Velazquez Day # 23 PCP Celine Loving DO        Subjective: protein dirk maln temp wasting and bmi=18.02  7.       DVT prophylaxis:  Subcutaneous heparin. 8.       Hypomagnesemia:  Replace per protocol. 9.       GI prophylaxis:  Protonix.   10.     Decubitus ulcers present on admit right heel ulcer with dti, sacral

## 2018-08-29 NOTE — SIGNIFICANT EVENT
RRT    *See RRT Documentation Record*    Reason the RRT was called: Chest pain/ desaturation   Assessment of patient leading up to RRT: VS, pain assessment.    Interventions/Testing: EKG, troponin, non rebreather mask, cardizem, ativan    Patient Outcome/Di

## 2018-08-29 NOTE — PROGRESS NOTES
INFECTIOUS DISEASE PROGRESS NOTE    [facility] Patient Status:  Inpatient    1945 MRN [id number]   Location [facility] 5SW/SE Attending Humaira Willingham, [address] [facility] Day # 20 PCP Tam Najera DO     Subjective:  No acute events o/n. major, in partial remission (HCC)     Protein-calorie malnutrition, mild (HCC)     BMI less than 19,adult     Decubitus ulcer of right heel, stage 1     Kyphosis (acquired) (postural)     Depression     Short-term memory loss     Closed left hip fracture, improved today. Patient does not report any SOB, no abdominal pain, no dysuria. States that she is feeling better.  ID consulted.     # Increasing leukocytosis with no fevers, ? possibly reactive to constipation/fecal impaction, sacral wound with no active

## 2018-08-29 NOTE — PROGRESS NOTES
[facility] Pharmacy Note:  Renal Adjustment for meropenem (MERREM)    Simran Calvillo is a 74year old female who has been prescribed meropenem (MERREM) 1000 mg every 8 hrs.   CrCl is estimated creatinine clearance is 82.2 mL/min (A) (based on SCr of 0.44 mg/dL (

## 2018-08-29 NOTE — PLAN OF CARE
Problem: Patient/Family Goals  Goal: Patient/Family Long Term Goal  Patient's Long Term Goal: Get rid of this pneumonia     Interventions:  - Administer antibiotics  - See additional Care Plan goals for specific interventions    Outcome: Not Progressing including physical limitations  - Instruct pt to call for assistance with activity based on assessment  - Modify environment to reduce risk of injury  - Provide assistive devices as appropriate  - Consider OT/PT consult to assist with strengthening/mobilit ulcer development  - Assess and document skin integrity  - Monitor for areas of redness and/or skin breakdown  - Initiate interventions, skin care algorithm/standards of care as needed   Outcome: Progressing      Problem: GASTROINTESTINAL - ADULT  Goal: Ma

## 2018-08-29 NOTE — PROGRESS NOTES
[place] [facility] - [facility]    Progress Note    Smiran Calvillo Patient Status:  Inpatient    1945 MRN [id number]   Location [facility] 5SW/SE Attending Radha Kc, [address] [facility] Day # 21 PCP Markell Griffin DO       Subjective:   Jonny Shone ipratropium-albuterol (DUONEB) nebulizer solution 3 mL 3 mL Nebulization 6 times per day   Atropine Sulfate 0.1 MG/ML injection 0.5 mg 0.5 mg Intravenous PRN   multivitamin with minerals (ADULT) tab 1 tablet 1 tablet Oral Daily   ipratropium-albuterol (D 08/28/2018   WBC 10.3 08/27/2018   WBC 8.7 08/26/2018     Lab Results  Component Value Date   HGB 10.4 (L) 08/28/2018   HGB 10.4 (L) 08/27/2018   HGB 10.2 (L) 08/26/2018      Lab Results  Component Value Date    08/28/2018    08/27/2018   PLT skin tear - as above sacral decub sacral decub seems to be healing  11.     Myalgias  - afebrile. RRT overnight for CP. Reviewed EKG. Neg for ischemia. Trop is negative. Pt was tachycardic which may have contributed. Cont on cardizem oral. Better today.  Al

## 2018-08-29 NOTE — SIGNIFICANT EVENT
Rapid response note    Rapid response called patient's room with complaint of chest pain. Patient admitted several weeks ago with pneumonia now with recurrent symptoms.   She has been having on and off chest pain and actually had a VQ scan 3 days ago which 90s on nasal cannula oxygen with the patient has been on for some time  -First troponin normal at 0.01, 2 are pending but anticipate this will be unchanged as well  -Continue antibiotic and pulmonary support  -Patient is on remote telemetry monitoring jose l

## 2018-08-29 NOTE — CONSULTS
[place] [facility] - [facility]    Report of Consultation    Haroon Romero Patient Status:  Inpatient    1945 MRN [id number]   Location [facility] 5SW/SE Attending Derick Robbins, [address] [facility] Day # 21 PCP Dav Ragland,      Date of Admi herself very restless and agitated.   There is report that patient has been getting delirious during this admission which is not surprising for her medical condition but also discontinuation of her psych medication also could be additional reason.     The p Nightly   Meropenem (MERREM) 500 mg in sodium chloride 0.9% 100 mL  mg Intravenous Q8H   Vancomycin HCl (FIRVANQ) 50 MG/ML oral solution 125 mg 125 mg Oral Daily   Vancomycin HCl (VANCOCIN) 1,000 mg in sodium chloride 0.9 % 250 mL IVPB add-vantage 1 HOURS AS NEEDED FORR WHEEZING   aspirin 81 MG Oral Chew Tab Chew 1 tablet (81 mg total) by mouth daily. calcitonin, salmon, 200 UNIT/ACT Nasal Solution 1 spray by Nasal route daily.    DICYCLOMINE HCL 10 MG Oral Cap TAKE 1 CAPSULE TWICE DAILY   NEXIUM 24H Results  Component Value Date   WBC 12.9 (H) 08/28/2018   HGB 10.4 (L) 08/28/2018   HCT 31.6 (L) 08/28/2018    08/28/2018   CREATSERUM 0.44 (L) 08/28/2018   BUN 21 (H) 08/28/2018    08/28/2018   K 3.2 (L) 08/29/2018   CL 98 08/28/2018   CO2 31 exhibiting more confusion and worsening can her depression. At this point, I would recommend the following approach:      1. Focus on education and support. 2.  Restart Zoloft 50 mg nightly. 3.  Start Zyprexa 2.5 mg nightly.   4.  Continue Ativan 0.5 mg Magnesium      Basic Metabolic Panel (8)      CBC With Differential With Platelet      Magnesium      Basic Metabolic Panel (8)      CBC With Differential With Platelet      Potassium      Magnesium      Basic Metabolic Panel (8)      CBC With Differential Sig: Take 2.5 mL (125 mg total) by mouth every 6 (six) hours.                Paramjit Friday, MD  8/29/2018

## 2018-08-30 NOTE — PROGRESS NOTES
[facility] dosing service    Follow-up Pharmacokinetic Consult for Vancomycin Dosing     Naima Minor is a 74year old female admitted on 8/28/2018 who is being treated for pneumonia. Patient is on day 3 of Vancomycin 1000mg; IV q12h}.   Goal trough 2. URINE CULTURE, ROUTINE Status: None   Collection Time: 08/22/18 11:40 AM   Result Value Ref Range   Urine Culture No Growth at 18-24 hrs. N/A           Based on the above: 1. Continue Vancomycin at 1250mg IVPB q 12 h (based on Trough of 13.5 ug/mL,

## 2018-08-30 NOTE — PROGRESS NOTES
[facility]    Progress Note    Simran Calvillo Patient Status:  Inpatient    1945 MRN [id number]   Location [facility] 5SW/SE Attending Radha Kc, [address] Day # 21 PCP Markell Griffin DO       Subjective:   Jonny Shone (PF) 4 MG/ML injection 4 mg 4 mg Intravenous Q4H PRN   DilTIAZem HCl ER Coated Beads (CARDIZEM CD) 24 hr cap 180 mg 180 mg Oral Daily   LORazepam (ATIVAN) tab 0.5 mg 0.5 mg Oral Q8H   hydrocodone-acetaminophen (NORCO) 7.5-325 MG per tab 1 tablet 1 tablet O upper lobe and bibasilar regions with interval progression     Dictated by (CST): Celestino Hopson MD on 8/30/2018 at 14:24     Approved by (CST): Celestino Hopson MD on 8/30/2018 at 14:31          Xr Chest Ap/pa (1 View) (cpt=71045)    Result Date: material noted throughout the colon consistent constipation/fecal retention. Mildly distended fluid-filled small bowel with distal small bowel fecal stasis. Question small bowel ileus versus fluid-filled small bowel related to fecal stasis.  7. S-shaped sco obstructive pulmonary disease. Aspiration pna and or HAP   - reviewed CXR on 8/25. Shows b/l infiltrates. Currently on iv abx for PNA. WBC improving.   - CT reviewed - b/l pneumonitis and right upper lobe. - consulted Pulm Dr Gema Oglesby.  Reviewed ABG showin

## 2018-08-30 NOTE — CONSULTS
[facility]    Report of Consultation    Wong Stage Patient Status:  Inpatient    1945 MRN [id number]   Location [facility] 5SW/SE Attending Andrew Arce, [address] [facility] Day # 21 PCP Jacklyn Whalen,      Date of Admiss Medications:  Metoclopramide HCl (REGLAN) injection 10 mg 10 mg Intravenous Q6H PRN   bisacodyl (DULCOLAX) rectal suppository 10 mg 10 mg Rectal Daily PRN   Pantoprazole Sodium (PROTONIX) 40 mg in Sodium Chloride 0.9 % 10 mL IV push 40 mg Intravenous Q24H Prescriptions Prior to Admission:  collagenase (SANTYL) 250 UNIT/GM External Ointment Apply small amount to wound daily   TOLTERODINE TARTRATE ER 2 MG Oral Capsule SR 24 Hr TAKE 1 CAPSULE EVERY DAY   ipratropium-albuterol 0.5-2.5 (3) MG/3ML Inhalatio not sure which caused reaction. Bactrim [Sulfametho*    RASH, SHORTNESS OF BREATH    Comment:Per pt received bactrim and clindamycin in Er not             sure which one caused the reaction.     Review of Systems:   Constitutional: Generalized milly 1. Emphysema with multifocal airspace disease, worst in the right upper lobe, with additional bibasilar opacities. An element of aspiration pneumonitis is also questioned.   2. Multilevel compression fracture deformities of the thoracolumbar spine with post multilevel spondylosis. Accentuated thoracic kyphosis. Multiple chronic compression fracture formation and multiple kyphoplasty changes 8.  Additional incidental findings described above    Dictated by (CST): Ruy Alex MD on 8/29/2018 at 18:41

## 2018-08-30 NOTE — PLAN OF CARE
Problem: Patient/Family Goals  Goal: Patient/Family Long Term Goal  Patient's Long Term Goal: Get rid of this pneumonia     Interventions:  - Administer antibiotics  - See additional Care Plan goals for specific interventions    Outcome: [address] environment to reduce risk of injury  - Provide assistive devices as appropriate  - Consider OT/PT consult to assist with strengthening/mobility  - Encourage toileting schedule   Outcome: Progressing      Problem: Patient Centered Care  Goal: Patient prefe baseline bowel function  INTERVENTIONS:  - Assess bowel function  - Maintain adequate hydration with IV or PO as ordered and tolerated  - Evaluate effectiveness of GI medications  - Encourage mobilization and activity  - Obtain nutritional consult as neede

## 2018-08-30 NOTE — PROGRESS NOTES
INFECTIOUS DISEASE PROGRESS NOTE    Rita Wade Patient Status:  Inpatient    1945 MRN [id number]   Location [facility] 5SW/SE Attending Nel Ferris DO   Hosp Day # 21 PCP Christina Milian DO     Subjective:  No acute events o/n. kyphoplasty     Depression, major, in partial remission ([facility] [address]     Protein-calorie malnutrition, mild (HCC)     BMI less than 19,adult     Decubitus ulcer of right heel, stage 1     Kyphosis (acquired) (postural)     Depression     Short-term memory loss Patient had increased abdominal pain on 8/22, CT abd/pelvis with constipation, given enemas with soft BMs yesterday. Abdominal pain improved today. Patient does not report any SOB, no abdominal pain, no dysuria. States that she is feeling better.  ID consul

## 2018-08-30 NOTE — PROGRESS NOTES
Leeanne Castaneda is a 74year old   female with significant history of scoliosis, degenerative disease, fibromyalgia, depression and an anxiety who presented to the hospital with pneumonia and multiple medical condition.   The patient seen t Packs/day: 1.20      Years: 40.00        Types: Cigarettes     Quit date: 5/23/2014  Smokeless tobacco: Never Used                      Alcohol use:  No                 Medications (Active prior to today's visit):    Current Facil Oral Daily   ipratropium-albuterol (DUONEB) nebulizer solution 3 mL 3 mL Nebulization Q6H PRN   Fluticasone Furoate-Vilanterol (BREO ELLIPTA) 100-25 MCG/INH inhaler 1 puff 1 puff Inhalation Daily   Normal Saline Flush 0.9 % injection 3 mL 3 mL Intravenous on 8/30/2018 at 14:24     Approved by (CST): Antwan Bautista MD on 8/30/2018 at 14:31          Xr Chest Ap/pa (1 View) (cpt=71045)    Result Date: 8/29/2018  CONCLUSION:  1.  Emphysema with multifocal airspace disease, worst in the right upper lobe, wit small bowel with distal small bowel fecal stasis. Question small bowel ileus versus fluid-filled small bowel related to fecal stasis. 7. S-shaped scoliosis and advanced multilevel spondylosis. Accentuated thoracic kyphosis.  Multiple chronic compression fra Visit:    Orders Placed This Encounter      Basic Metabolic Panel (8)      CBC With Differential With Platelet      Urinalysis with Culture Reflex STAT      Lactic Acid, Plasma      Magnesium      BNP (Brain Natriuretic Peptide)      LACTIC ACID RFLX DO NO With Differential With Platelet      CBC With Differential With Platelet      Basic Metabolic Panel (8)      Magnesium      Urinalysis, Routine Once      Potassium      Magnesium      CBC With Differential With Platelet      Basic Metabolic Panel (8)

## 2018-08-30 NOTE — CONSULTS
Lolly Philippe 89  Report of GI Consultation    Lizzette Civil Patient Status:  Inpatient    1945 MRN [id number]   Location [facility] 5SW/SE Attending Jeane Yeager, [address] [facility] Day # 21 PCP Reynaldo Baugh, DO     Date of Admis CT findings:    CT chest 8/24/2018: \"MEDIASTINUM/LUIS A:    There is a large hiatal hernia. There is concentric thickening of the mid to distal esophagus, similar to prior study. No mediastinal lymphadenopathy.  Limited evaluation for hilar adenopathy with n concern for aspiration pneumonia. Other medical problems include fibromyalgia, chronic fatigue syndrome, anxiety, apparently previous smoking.     Ms. Usha Zaragoza was admitted to the Emergency Department 8/9/2018 with concern for weakness, and pneumonia on ini fracture 02/ 2011    from fall        Past Surgical History  Past Surgical History:  2009: CARPAL TUNNEL RELEASE      Comment: justino WEBB CTS surgery  2009: CATARACT EXTRACTION  1-: COLONOSCOPY & POLYPECTOMY  No date: FRACTURE SURGERY      Comment: L Or      morphINE sulfate (PF) 4 MG/ML injection 4 mg 4 mg Intravenous Q4H PRN   DilTIAZem HCl ER Coated Beads (CARDIZEM CD) 24 hr cap 180 mg 180 mg Oral Daily   LORazepam (ATIVAN) tab 0.5 mg 0.5 mg Oral Q8H   hydrocodone-acetaminophen (NORCO) 7.5-325 MG mouth nightly. latanoprost (XALATAN) 0.005 % Ophthalmic Solution Place 1 drop into both eyes nightly. Sertraline HCl (ZOLOFT) 100 MG Oral Tab Take 300 mg by mouth daily.      Butalbital-APAP-Caffeine (FIORICET) -40 MG Oral Tab Take 1 tablet by sounds; soft/nondistended; no epigastric or other tenderness  EXT: No edema  SKIN: No rash    Results:     Laboratory Data:    Lab Results  Component Value Date   WBC 11.6 (H) 08/30/2018   HGB 9.9 (L) 08/30/2018    08/30/2018   CREATSERUM 0.38 (L) 0 20:04     Approved by (CST): Marilee Monaco MD on 8/29/2018 at 20:08          Us Abdomen Complete (cpt=76700)    Result Date: 8/30/2018  CONCLUSION:  1. Limited study. 2. Mild dilatation of extrahepatic bile ducts.      Dictated by (CST): Darrin Ornelas

## 2018-08-30 NOTE — PLAN OF CARE
Problem: Patient/Family Goals  Goal: Patient/Family Long Term Goal  Patient's Long Term Goal: Get rid of this pneumonia     Interventions:  - Administer antibiotics  - See additional Care Plan goals for specific interventions    Outcome: Not Progressing activity based on assessment  - Modify environment to reduce risk of injury  - Provide assistive devices as appropriate  - Consider OT/PT consult to assist with strengthening/mobility  - Encourage toileting schedule   Outcome: Progressing  Bed low and in l care algorithm/standards of care as needed   Outcome: Progressing  Wound dressings changed    Problem: GASTROINTESTINAL - ADULT  Goal: Maintains or returns to baseline bowel function  INTERVENTIONS:  - Assess bowel function  - Maintain adequate hydration w

## 2018-08-31 NOTE — DIETARY NOTE
ADULT NUTRITION REASSESSMENT     Pt is at moderate nutrition risk. Pt does not meet malnutrition criteria. RECOMMENDATIONS TO MD:  1. Added Pro-pass to breakfast cereal to provide 6 g additional protein daily  2.  Obtained further food preferences to pudding. Hence will continue these. Pt is a Feed. Appears intake and appetite is improving well. 8/23 Update: Po intake decreased. Pt orders only 2 items ( fruits) on meals and Intake of ONS has decreased.  Diet downgrade to puree likely has affected [place] Weight   08/16/18 0619 47.1 kg (103 lb 12.8 oz)   08/15/18 0723 48 kg (105 lb 12.8 oz)   08/13/18 0643 49.1 kg (108 lb 3.2 oz)           08/09/18 0640 43.7 kg (96 lb 4.8 oz)   08/09/18 0038 40.8 kg (90 lb)     GASTROINTESTINAL: dysphagia and swallow evalua status    DIETITIAN FOLLOW UP: RD to follow up within 7 days   [facility], [address] [place] [address]

## 2018-08-31 NOTE — PROGRESS NOTES
[place] [facility]    Progress Note    Rena Clifford Patient Status:  Inpatient    1945 MRN [id number]   Location [facility] 5SW/SE Attending Yessica Daigle, [address] [facility] Day # 25 PCP Frankie Weller DO       Subjective:   Urbano Caldera Vancomycin HCl (FIRVANQ) 50 MG/ML oral solution 125 mg 125 mg Oral Daily   Alum & Mag Hydroxide-Simeth (MAALOX) oral suspension 30 mL 30 mL Oral QID PRN   morphINE sulfate (PF) 2 MG/ML injection 2 mg 2 mg Intravenous Q4H PRN   Or      morphINE sulfate (P Xr Chest Ap/pa (1 View) (cpt=71045)    Result Date: 8/30/2018  CONCLUSION:  1. Borderline cardiomegaly. Tortuous atherosclerotic aorta 2. Emphysematous changes. Corresponding vascular markings.  Multifocal pneumonitis involving the right upper lobe an distal common duct due to respiratory motion. Mild gallbladder wall enhancement. Recommend gallbladder ultrasound. 5. Extensive thoracoabdominal aorta with atherosclerosis without aneurysmal dilatation or dissection.  6. Large amount of fecal material noted abx for PNA. WBC improving.   - CT reviewed - b/l pneumonitis and right upper lobe. - consulted Pulm Dr Mauricio Kam. Reviewed ABG showing resp acidosis. On 5 L oxygen. - apprec ID consult - cont iv abx until 9/7   - monitor daily cbc     2.  CP - RRT 2 nigh

## 2018-08-31 NOTE — PROGRESS NOTES
Lolly Philippe 89  GI SERVICE PROGRESS NOTE    Franck Demarco Patient Status:  Inpatient    1945 MRN [id number]   Location [facility] 5SW/SE Attending Alona Almazan, [address] [facility] Day # 25 PCP Lucrecia Warren DO       Subjective: --    --    --    BILT   --   0.6   --    --    --    TP   --   6.9   --    --    --        No results for input(s): SHAWN HERNANDEZ in the last 168 hours.     Recent Labs   Lab  08/28/18   7645  08/28/18   2100  08/28/18   2331  08/29/18   0617  08/30/18   0079 recurrent aspiration pneumonitis 2. Large hiatal hernia. Dilated fluid filled esophagus and extends up to the thoracic inlet. Suspect esophageal achalasia which may contribute to recurrent aspiration pneumonitis. 3. Mild distal esophageal wall thickening. ulcer.     GI service is consulted for severe atypical chest pain complaint, abnormal esophagus on CT scans above.     8/30/2018: Supportive care, antibiotics, added fluconazole antifungal    8/31/2018: Chest pain much better; passed a BM     Recommendation

## 2018-08-31 NOTE — PLAN OF CARE
Problem: SAFETY ADULT - FALL  Goal: Free from fall injury  INTERVENTIONS:  - Assess pt frequently for physical needs  - Identify cognitive and physical deficits and behaviors that affect risk of falls.   - [place] fall precautions as indicated by assessme Progressing  Patient had 2 loose bowel movement during night shift

## 2018-08-31 NOTE — PROGRESS NOTES
INFECTIOUS DISEASE PROGRESS NOTE    Joe Zaman Patient Status:  Inpatient    1945 MRN [id number]   Location [facility] 5SW/SE Attending Stanton Gilman, [address] [facility] Day # 25 PCP Harvey Olsen DO     Subjective:  No acute events o/n. kyphoplasty     Depression, major, in partial remission ([facility] [address]     Protein-calorie malnutrition, mild (HCC)     BMI less than 19,adult     Decubitus ulcer of right heel, stage 1     Kyphosis (acquired) (postural)     Depression     Short-term memory loss had increased abdominal pain on 8/22, CT abd/pelvis with constipation, given enemas with soft BMs yesterday. Abdominal pain improved today. Patient does not report any SOB, no abdominal pain, no dysuria. States that she is feeling better.  ID consulted.

## 2018-08-31 NOTE — WOUND PROGRESS NOTE
Pt seen with bedside Rn present to assist with positioning. Pt is on a first step air mattress and has bilateral heel boots on. The left heel is c/d/i, no wound noted. The right heel has a resolving DTI.  Foam dressing removed, wound traced and measured and

## 2018-08-31 NOTE — PROGRESS NOTES
[place] [facility] - [facility]    Progress Note    Celso Frankel Patient Status:  Inpatient    1945 MRN [id number]   Location [facility] 5SW/SE Attending Masoud Steel, [address] [facility] Day # 25 PCP Brandon Vidales DO        Subjective:     Co 6.9 08/29/2018   AST 20 08/29/2018   ALT 16 08/29/2018   PTT 39.5 (H) 08/09/2018   INR 1.2 08/09/2018   TSH 2.60 11/12/2017   ESRML 22 03/29/2018   CRP 0.5 03/29/2018   MG 1.9 08/30/2018   PHOS 3.1 08/14/2018   TROP 0.01 08/28/2018   CK 78 09/28/2017   B12 inlet. Suspect esophageal achalasia which may contribute to recurrent aspiration pneumonitis. 3. Mild distal esophageal wall thickening. Recommend upper endoscopy if not recently performed.  4. Abnormal intrahepatic hepatic biliary dilatation with limited v

## 2018-08-31 NOTE — PLAN OF CARE
Problem: Patient/Family Goals  Goal: Patient/Family Long Term Goal  Patient's Long Term Goal: Get rid of this pneumonia     Interventions:  - Administer antibiotics  - See additional Care Plan goals for specific interventions    Outcome: [address] based on assessment  - Modify environment to reduce risk of injury  - Provide assistive devices as appropriate  - Consider OT/PT consult to assist with strengthening/mobility  - Encourage toileting schedule   Outcome: Progressing  Bed low and in locked pos Initiate interventions, skin care algorithm/standards of care as needed   Outcome: Progressing  Seen by wound care this am    Problem: GASTROINTESTINAL - ADULT  Goal: Maintains or returns to baseline bowel function  INTERVENTIONS:  - Assess bowel function

## 2018-09-01 NOTE — PROGRESS NOTES
[place] [facility] - [facility]    Progress Note    Korypascual Brown Patient Status:  Inpatient    1945 MRN [id number]   Location [facility] 5SW/SE Attending Mic Hoyt, [address] [facility] Day # 21 PCP Shade Bailey DO       Subjective:   Eleni Single MG/ML oral solution 125 mg 125 mg Oral Daily   Alum & Mag Hydroxide-Simeth (MAALOX) oral suspension 30 mL 30 mL Oral QID PRN   morphINE sulfate (PF) 2 MG/ML injection 2 mg 2 mg Intravenous Q4H PRN   Or      morphINE sulfate (PF) 4 MG/ML injection 4 mg 4 mg CBC:      Lab Results  Component Value Date   WBC 12.4 (H) 09/01/2018   WBC 9.3 08/31/2018   WBC 11.6 (H) 08/30/2018       Lab Results  Component Value Date   HGB 9.9 (L) 09/01/2018   HGB 9.5 (L) 08/31/2018   HGB 9.9 (L) 08/30/2018        Lab Results the left:  Unfortunately, this makes her ability to change positions in bed and to ambulate very difficult and predisposed to decubitus ulcers.  We will continue Santyl and foam dressing  7. Diarrhea - 2/2 c diff.  Resolved with oral vanco   8.

## 2018-09-01 NOTE — PROGRESS NOTES
[facility] dosing service    Follow-up Pharmacokinetic Consult for Vancomycin Dosing     Marky Meek is a 74year old female admitted on 8/9 who is being treated for pneumonia. Patient is on day 5 of Vancomycin 1.25 gm IV Q 12 hours.   Goal trough CULTURE, ROUTINE Status: None   Collection Time: 08/22/18 11:40 AM   Result Value Ref Range   Urine Culture No Growth at 18-24 hrs. N/A       Radiology: 8/30 cxr  CONCLUSION:   1. Borderline cardiomegaly. Tortuous atherosclerotic aorta  2.  Emphysematous ch

## 2018-09-01 NOTE — PROGRESS NOTES
Marvin Parr is a 74year old   female with significant history of scoliosis, degenerative disease, fibromyalgia, depression and an anxiety who presented to the hospital with pneumonia and multiple medical condition.   The patient seen t History: Smoking status: Former Smoker                                                              Packs/day: 1.20      Years: 40.00        Types: Cigarettes     Quit date: 5/23/2014  Smokeless tobacco: Never Used                      Alcohol use:  No PRN   multivitamin with minerals (ADULT) tab 1 tablet 1 tablet Oral Daily   ipratropium-albuterol (DUONEB) nebulizer solution 3 mL 3 mL Nebulization Q6H PRN   Fluticasone Furoate-Vilanterol (BREO ELLIPTA) 100-25 MCG/INH inhaler 1 puff 1 puff Inhalation Charlene Vernon Dictated by (CST): Sahra Head MD on 8/30/2018 at 14:24     Approved by (CST): Sahra Head MD on 8/30/2018 at 14:31          Xr Chest Ap/pa (1 View) (cpt=71045)    Result Date: 8/29/2018  CONCLUSION:  1.  Emphysema with multifocal airspace retention. Mildly distended fluid-filled small bowel with distal small bowel fecal stasis. Question small bowel ileus versus fluid-filled small bowel related to fecal stasis. 7. S-shaped scoliosis and advanced multilevel spondylosis.  Accentuated thoracic k depends on the tolerance.       Orders This Visit:    Orders Placed This Encounter      Basic Metabolic Panel (8)      CBC With Differential With Platelet      Urinalysis with Culture Reflex STAT      Lactic Acid, Plasma      Magnesium      BNP (Brain Daylin Coronadoic Platelet      Basic Metabolic Panel (8)      CBC With Differential With Platelet      CBC With Differential With Platelet      Basic Metabolic Panel (8)      Magnesium      Urinalysis, Routine Once      Potassium      Magnesium      CBC With Differential W 6 (six) hours.              8/31/2018  Patti Henriquez MD

## 2018-09-01 NOTE — PLAN OF CARE
Problem: Patient/Family Goals  Goal: Patient/Family Long Term Goal  Patient's Long Term Goal: Get rid of this pneumonia     Interventions:  - Administer antibiotics  - See additional Care Plan goals for specific interventions    Outcome: [address] as appropriate  - Consider OT/PT consult to assist with strengthening/mobility  - Encourage toileting schedule   Outcome: Progressing      Comments: Patient alert with mild confusion. Repositioned frequently. Aspiration precautions in place.  Tolerated meds

## 2018-09-01 NOTE — PROGRESS NOTES
[facility]     Progress Note        Jackelyn Alcaraz Patient Status:  Inpatient    1945 MRN [id number]   Location [facility] 5SW/SE Attending Jennifer Gutierrez, [address] [facility] [address] Day # 23 PCP Filipe Marion, DO       Subjective:   Pa PRN   Or      morphINE sulfate (PF) 4 MG/ML injection 4 mg 4 mg Intravenous Q4H PRN   DilTIAZem HCl ER Coated Beads (CARDIZEM CD) 24 hr cap 180 mg 180 mg Oral Daily   LORazepam (ATIVAN) tab 0.5 mg 0.5 mg Oral Q8H   hydrocodone-acetaminophen (NORCO) 7.5-325 Dictated by (CST): Antwan Bautista MD on 8/30/2018 at 14:24     Approved by (CST): Antwan Bautista MD on 8/30/2018 at 14:31                  Assessment   1. Acute hypoxemic respiratory failure  2. Pneumonia likely secondary to aspiration  3.   CO

## 2018-09-02 NOTE — PROGRESS NOTES
[place] [facility] - [facility]     Progress Note        Rena Clifford Patient Status:  Inpatient    1945 MRN [id number]   Location [facility] 5SW/SE Attending Yessica Daigle, [address] [facility] Day # 24 PCP Frankie Weller DO       Subjective:   Pa QID PRN   morphINE sulfate (PF) 2 MG/ML injection 2 mg 2 mg Intravenous Q4H PRN   Or      morphINE sulfate (PF) 4 MG/ML injection 4 mg 4 mg Intravenous Q4H PRN   DilTIAZem HCl ER Coated Beads (CARDIZEM CD) 24 hr cap 180 mg 180 mg Oral Daily   LORazepam (AT Hiatal hernia     Plan   -Patient with evidence of acute hypoxemic respiratory failure. Recurrent hospitalizations with evidence of pneumonia likely secondary to aspiration.   -CT chest on 8/29/2018 with evidence of area of consolidation seen in the right

## 2018-09-02 NOTE — PROGRESS NOTES
[facility]    Progress Note    Marky Meek Patient Status:  Inpatient    1945 MRN [id number]   Location [facility] 5SW/SE Attending Aric Guillen, [address] [facility] Day # 25 PCP Lucy Robertson DO       Subjective:   Claudette Carvajal (MERREM) 500 mg in sodium chloride 0.9% 100 mL  mg Intravenous Q8H   Vancomycin HCl (FIRVANQ) 50 MG/ML oral solution 125 mg 125 mg Oral Daily   Alum & Mag Hydroxide-Simeth (MAALOX) oral suspension 30 mL 30 mL Oral QID PRN   morphINE sulfate (PF) 2 M 08/14/2018   TROP 0.01 08/28/2018   CK 78 09/28/2017   B12 927 (H) 10/02/2017                 Results:     CBC:      Lab Results  Component Value Date   WBC 15.1 (H) 09/02/2018   WBC 12.4 (H) 09/01/2018   WBC 9.3 08/31/2018       Lab Results  Component Naima day  - psych consulted for ativan withdrawal - restarted on ativan tid    6.       Severe kyphosis of the cervical and thoracic spine with torticollis to the left:  Unfortunately, this makes her ability to change positions in bed and to ambulate very diffi

## 2018-09-03 NOTE — PLAN OF CARE
Problem: Patient/Family Goals  Goal: Patient/Family Long Term Goal  Patient's Long Term Goal: Get rid of this pneumonia     Interventions:  - Administer antibiotics  - See additional Care Plan goals for specific interventions    Outcome: Progressing  Patie by assessment.  - Educate pt/family on patient safety including physical limitations  - Instruct pt to call for assistance with activity based on assessment  - Modify environment to reduce risk of injury  - Provide assistive devices as appropriate  - Consi and document risk factors for pressure ulcer development  - Assess and document skin integrity  - Monitor for areas of redness and/or skin breakdown  - Initiate interventions, skin care algorithm/standards of care as needed   Outcome: Progressing  Daily

## 2018-09-03 NOTE — PROGRESS NOTES
[facility]    Progress Note      Assessment and Plan:   1. Acute on chronic respiratory failure–my strong suspicion is that the patient has recurrent pneumonia related to her achalasia.   All of this is superimposed on her COPD, kyphoscolio

## 2018-09-03 NOTE — PROGRESS NOTES
[place] [facility] - [facility]    Progress Note    Kory Harpreetvilla Patient Status:  Inpatient    1945 MRN [id number]   Location [facility] 5SW/SE Attending Mic Hoyt, [address] [facility] Day # 25 PCP Shade Bailey DO       Subjective:   Eleni Single Oral Daily   Alum & Mag Hydroxide-Simeth (MAALOX) oral suspension 30 mL 30 mL Oral QID PRN   morphINE sulfate (PF) 2 MG/ML injection 2 mg 2 mg Intravenous Q4H PRN   Or      morphINE sulfate (PF) 4 MG/ML injection 4 mg 4 mg Intravenous Q4H PRN   DilTIAZem H Results  Component Value Date   WBC 12.1 (H) 09/03/2018   WBC 15.1 (H) 09/02/2018   WBC 12.4 (H) 09/01/2018       Lab Results  Component Value Date   HGB 9.5 (L) 09/03/2018   HGB 10.3 (L) 09/02/2018   HGB 9.9 (L) 09/01/2018        Lab Results  Component Va been taking 13 mg/day in past now takes 1 mg twice a day  - psych consulted for ativan withdrawal - restarted on ativan tid    6.       Severe kyphosis of the cervical and thoracic spine with torticollis to the left:  Unfortunately, this makes her ability

## 2018-09-03 NOTE — PROGRESS NOTES
[facility] dosing service    Follow-up Pharmacokinetic Consult for Vancomycin Dosing     Miguel Angel Soto is a 74year old female who is being treated for pneumonia. Patient is on day 7 of Vancomycin 1 gm IV Q 12 hours. Goal trough is 15-20 ug/mL. ROUTINE Status: None   Collection Time: 08/22/18 11:40 AM   Result Value Ref Range   Urine Culture No Growth at 18-24 hrs. N/A         Based on the above:    1.  Continue Vancomycin at 1 gm IVPB Q 12 hours (based on Trough of 16.2 ug/mL, pharmacokinetics, 4

## 2018-09-04 NOTE — PROGRESS NOTES
INFECTIOUS DISEASE PROGRESS NOTE    Niravla Mauro Patient Status:  Inpatient    1945 MRN [id number]   Location [facility] 5SW/SE Attending Nel Ferris, [address] [facility] Day # 32 PCP Christina Milian DO     Subjective:  No acute events o/n. fracture     Chronic back pain greater than 3 months duration     Tobacco dependence in remission     Atherosclerosis of aorta (HCC)     H/O kyphoplasty     Depression, major, in partial remission (HCC)     Protein-calorie malnutrition, mild (HCC)     BMI hospital course has remained on 2L NC, afebrile, has had mild leukocytosis during hospital stay, wbc from 12.5 to 15.2 today (8/23). Patient had increased abdominal pain on 8/22, CT abd/pelvis with constipation, given enemas with soft BMs yesterday.  Abdomi

## 2018-09-04 NOTE — CONSULTS
253 [facility] Patient Status:  Inpatient    1945 MRN [id number]   Location [facility] 5SW/SE Attending Daniel Hernandez, [address] [facility] Day # 32 PCP Javier Diaz,      Date of any abdominal pain, n/v and moved her bowels yesterday. +incontinence. +decubitus ulcers on sacrum, R heel DTI per wound care. Pt has been followed by psychiatry for anxiety/dementia. No signs of agitation noted.  Pt is bed bound and dependent on all ADL's hospice care. I provided overview of Medicare hospice benefit and philosophy.  is interested in informational session with [facility] hospice as [facility] did not see pt while she was in rehab last time. I did provide choice of hospice agencies.   I Information  [employer]eterian Affiliation: Adventist  Ethnicity:     Allergies:    Clindamycin             RASH, SHORTNESS OF BREATH    Comment:Per pt received this medicine and bactrim in ER             not sure which caused reaction.   Bactrim [Sulfameth Intravenous, Q4H PRN  •  DilTIAZem HCl ER Coated Beads (CARDIZEM CD) 24 hr cap 180 mg, 180 mg, Oral, Daily  •  hydrocodone-acetaminophen (NORCO) 7.5-325 MG per tab 1 tablet, 1 tablet, Oral, Q4H PRN  •  guaiFENesin (ROBITUSSIN) 100 MG/5ML solution 100 mg, 1 08/30/2018   PHOS 3.1 08/14/2018   TROP 0.01 08/28/2018       Imaging:  Xr Chest Ap Portable  (cpt=71045)    Result Date: 9/4/2018  CONCLUSION:  1. Borderline cardiomegaly. Tortuous atherosclerotic aorta. 2. Emphysematous changes.  Chronic coarse crepitatio care    Spiritual needs addressed: Referral placed for Spiritual Care    Disposition: ongoing goals of care discussions    Procedures:  No intubation  No g-tube    Assessment/Recommendations:  Acute on chronic respiratory failure  -remains on nc4L, per pul contact, history taking, physical examination, and >50% was spent counseling and coordinating care. Discussed today's visit with Dr. Emmette Leventhal, [facility] for Amelia DONOHUE and Zuly ALVAREZ. I will continue to follow clinically.     Thank you for allowing Palliative Care

## 2018-09-04 NOTE — PLAN OF CARE
Problem: PAIN - ADULT  Goal: Verbalizes/displays adequate comfort level or patient's stated pain goal  INTERVENTIONS:  - Encourage pt to monitor pain and request assistance  - Assess pain using appropriate pain scale  - Administer analgesics based on type handout, if applicable  - Encourage broncho-pulmonary hygiene including cough, deep breathe, Incentive Spirometry  - Assess the need for suctioning and perform as needed  - Assess and instruct to report SOB or any respiratory difficulty  - Respiratory Ther

## 2018-09-04 NOTE — PLAN OF CARE
Problem: Patient/Family Goals  Goal: Patient/Family Long Term Goal  Patient's Long Term Goal: Get rid of this pneumonia     Interventions:  - Administer antibiotics  - See additional Care Plan goals for specific interventions    Outcome: [address] reduce risk of injury  - Provide assistive devices as appropriate  - Consider OT/PT consult to assist with strengthening/mobility  - Encourage toileting schedule   Outcome: Progressing  Bed alarm on, frequent rounding on patient    Problem: Patient Raine Matos

## 2018-09-04 NOTE — PROGRESS NOTES
[facility]    Progress Note    Jorge Luis Lees Patient Status:  Inpatient    1945 MRN [id number]   Location [facility] 5SW/SE Attending Cassandra Farris, [address] [facility] Day # 32 PCP Gunjan Alvarado DO       Subjective:   July Kyle (ZYPREXA) tab 2.5 mg 2.5 mg Oral Nightly   Meropenem (MERREM) 500 mg in sodium chloride 0.9% 100 mL  mg Intravenous Q8H   Vancomycin HCl (FIRVANQ) 50 MG/ML oral solution 125 mg 125 mg Oral Daily   Alum & Mag Hydroxide-Simeth (MAALOX) oral suspension 08/14/2018   TROP 0.01 08/28/2018   CK 78 09/28/2017   B12 927 (H) 10/02/2017       Xr Chest Ap Portable  (cpt=71045)    Result Date: 9/4/2018  CONCLUSION:  1. Borderline cardiomegaly. Tortuous atherosclerotic aorta. 2. Emphysematous changes.  Chronic coars with EF 75%. Wall motion was normal. CT repeated  showing evidence of achalasia. Have consulted GI Dr Denise Preciado. Discussed possible candida and possible EGD when stable from Pulm standpoint.   - apprec GI consult. Cp resolved with fluconazole  .  Cont day #6

## 2018-09-04 NOTE — PROGRESS NOTES
[facility]    Progress Note      Assessment and Plan:   1. Acute on chronic respiratory failure–my strong suspicion is that the patient has recurrent pneumonia related to her achalasia.   All of this is superimposed on her COPD, kyphoscolio [address] [facility]  Medical Director, [facility]  Pager: [phone]

## 2018-09-04 NOTE — CM/SW NOTE
SW received MDO for referral to [facility].  Sw placed referral.    [facility] ext [phone]

## 2018-09-05 NOTE — PROGRESS NOTES
INFECTIOUS DISEASE PROGRESS NOTE    John Kelley Patient Status:  Inpatient    1945 MRN [id number]   Location [facility] 5SW/SE Attending Leydi Velazquez Carla Day # 32 PCP Javier Diaz DO     Subjective:  No acute events pathological fracture     Chronic back pain greater than 3 months duration     Tobacco dependence in remission     Atherosclerosis of aorta (HCC)     H/O kyphoplasty     Depression, major, in partial remission (HCC)     Protein-calorie malnutrition, mild ( 8/21. During hospital course has remained on 2L NC, afebrile, has had mild leukocytosis during hospital stay, wbc from 12.5 to 15.2 today (8/23).  Patient had increased abdominal pain on 8/22, CT abd/pelvis with constipation, given enemas with soft BMs yest

## 2018-09-05 NOTE — PROGRESS NOTES
[facility]    Progress Note      Assessment and Plan:   1. Acute on chronic respiratory failure–my strong suspicion is that the patient has recurrent pneumonia related to her achalasia.   All of this is superimposed on her COPD, kyphoscolio  09/05/2018   CREATSERUM 0.35 09/05/2018   BUN 10 09/05/2018    09/05/2018   K 4.1 09/05/2018    09/05/2018   CO2 31 09/05/2018   GLU 91 09/05/2018   CA 8.7 09/05/2018     Chest x-ray–pending    Arsenio Valdivia MD  Medical Director,

## 2018-09-05 NOTE — PROGRESS NOTES
[place] [facility]    Progress Note    Meche Holloway Patient Status:  Inpatient    1945 MRN [id number]   Location [facility] 5SW/SE Attending Leydi Velazquez Carla Day # 32 PCP Price Fair DO        Subjective: consulted GI Dr Kings Ellis. Discussed possible candida and possible EGD when stable from Pulm standpoint.   - apprec GI consult. Cp resolved with fluconazole  . Cont day #6  - cont ppi   - limit norco      3.     Constipation - seen on CT.  Cont bowel regimen - 08/14/2018   TROP 0.01 08/28/2018   CK 78 09/28/2017   B12 927 (H) 10/02/2017       Xr Chest Ap Portable  (cpt=71045)    Result Date: 9/4/2018  CONCLUSION:  1. Borderline cardiomegaly. Tortuous atherosclerotic aorta. 2. Emphysematous changes.  Chronic coars

## 2018-09-05 NOTE — PLAN OF CARE
Problem: PAIN - ADULT  Goal: Verbalizes/displays adequate comfort level or patient's stated pain goal  INTERVENTIONS:  - Encourage pt to monitor pain and request assistance  - Assess pain using appropriate pain scale  - Administer analgesics based on type patient and family knowledge, values, beliefs, and cultural backgrounds into the planning and delivery of care  - Encourage patient/family to participate in care and decision-making at the level they choose  - Honor patient and family perspectives and goode

## 2018-09-05 NOTE — CONSULTS
[place] [facility]  Palliative Care Follow Up    Rita Wade Patient Status:  Inpatient    1945 MRN [id number]   Location [facility] 5SW/SE Attending Leydi Velazquez Day # 32 PCP Christina Milian DO     Date of Oral, Q6H PRN  •  LORazepam (ATIVAN) tab 0.25 mg, 0.25 mg, Oral, QID  •  LORazepam (ATIVAN) tab 0.25 mg, 0.25 mg, Oral, Q6H PRN  •  Meropenem (MERREM) 500 mg in sodium chloride 0.9% 100 mL MBP, 500 mg, Intravenous, Q8H  •  ipratropium-albuterol (DUONEB) ne Fluticasone Furoate-Vilanterol (BREO ELLIPTA) 100-25 MCG/INH inhaler 1 puff, 1 puff, Inhalation, Daily  •  Normal Saline Flush 0.9 % injection 3 mL, 3 mL, Intravenous, PRN  •  Heparin Sodium (Porcine) 5000 UNIT/ML injection 5,000 Units, 5,000 Units, Subcut temperature 98.3 °F (36.8 °C), temperature source Axillary, resp. rate 22, weight 105 lb 1.6 oz (47.7 kg), SpO2 99 %. Body mass index is 18.62 kg/m².   Present Level of pain: 0  Non-verbal signs of pain present: NO    Physical Exam:  General: Alert and in ulcers-POA  -per wound care    Pain   -Controlled, continue Norco and Morphine prn    Goals of care, counseling/discussion  -see subjective above for details  -Pt is DNR, DNI, no g-tube and continue supportive care while hospitalized.   is leaning to

## 2018-09-05 NOTE — PROGRESS NOTES
Flori Beasley is a 74year old   female with significant history of scoliosis, degenerative disease, fibromyalgia, depression and an anxiety who presented to the hospital with pneumonia and multiple medical condition.   The patient seen t cancer- cause of death      Social History: Smoking status: Former Smoker                                                              Packs/day: 1.20      Years: 40.00        Types: Cigarettes     Quit date: 5/23/2014  Smokeless tobacco: Never Used tablet 1 tablet Oral Q4H PRN   guaiFENesin (ROBITUSSIN) 100 MG/5ML solution 100 mg 100 mg Oral Q4H PRN   Atropine Sulfate 0.1 MG/ML injection 0.5 mg 0.5 mg Intravenous PRN   multivitamin with minerals (ADULT) tab 1 tablet 1 tablet Oral Daily   ipratropium- aorta. 2. Emphysematous changes. Chronic coarse crepitation markings with localized right upper lobe and medial segment interstitial pneumonitis. Suspect multifocal pneumonitis.      Dictated by (CST): Ashely Milner MD on 9/04/2018 at 13:10     Approv Lactic Acid, Plasma      Magnesium      BNP (Brain Natriuretic Peptide)      LACTIC ACID RFLX DO NOT CANCEL      CBC With Differential With Platelet      Comp Metabolic Panel (14)      Lactic Acid, Plasma      Prothrombin Time (PT)      PTT, Activated Potassium      Magnesium      CBC With Differential With Platelet      Basic Metabolic Panel (8)      Magnesium      Magnesium      CBC With Differential With Platelet      Basic Metabolic Panel (8)      Basic Metabolic Panel (8)      Magnesium      Digoxi every 4 (four) hours as needed for Anxiety. Vancomycin HCl 50 MG/ML Oral Recon Soln 70 mL 0      Sig: Take 2.5 mL (125 mg total) by mouth every 6 (six) hours.              9/4/2018  Fede Moreau MD

## 2018-09-05 NOTE — PLAN OF CARE
Problem: PAIN - ADULT  Goal: Verbalizes/displays adequate comfort level or patient's stated pain goal  INTERVENTIONS:  - Encourage pt to monitor pain and request assistance  - Assess pain using appropriate pain scale  - Administer analgesics based on type Spirometry  - Assess the need for suctioning and perform as needed  - Assess and instruct to report SOB or any respiratory difficulty  - Respiratory Therapy support as indicated  - Manage/alleviate anxiety  - Monitor for signs/symptoms of CO2 retention   O

## 2018-09-06 NOTE — CONSULTS
[place] [facility] - [facility]  Palliative Care Follow Up    Estela Armindajairon Patient Status:  Inpatient    1945 MRN [id number]   Location [facility] 5SW/SE Attending Leydi Velazquez Carla Day # 29 PCP Markell Valencia DO     Date of 0.25 mg, 0.25 mg, Oral, QID  •  LORazepam (ATIVAN) tab 0.25 mg, 0.25 mg, Oral, Q6H PRN  •  Meropenem (MERREM) 500 mg in sodium chloride 0.9% 100 mL MBP, 500 mg, Intravenous, Q8H  •  ipratropium-albuterol (DUONEB) nebulizer solution 3 mL, 3 mL, Nebulization ondansetron HCl (ZOFRAN) injection 4 mg, 4 mg, Intravenous, Q6H PRN  •  collagenase (SANTYL) 250 UNIT/GM ointment, , Topical, Daily  •  acetaminophen (TYLENOL EXTRA STRENGTH) tab 500 mg, 500 mg, Oral, Q6H PRN    Prior to Admission Medications:  hydrocodone appearing  HEENT: No focal deficits. Cardiac: Regular rate and rhythm, S1, S2 normal, no murmur, rub or gallop. Lungs: Coarse breath sounds bilaterally. Normal excursions and effort.   Abdomen: Soft, non-tender, normal bowel sounds X 4 quadrants, no rebo pain/if Roxanol not helping. Discussed with pt/    Goals of care, counseling/discussion  -Pt is DNR, DNI, no g-tube and continue supportive care while hospitalized. -Plan for [address] [facility] with [employer] care upon dc.  Possible dc on Sat per

## 2018-09-06 NOTE — PROGRESS NOTES
Isabel Bains is a 74year old   female with significant history of scoliosis, degenerative disease, fibromyalgia, depression and an anxiety who presented to the hospital with pneumonia and multiple medical condition.   The patient seen t Cigarettes     Quit date: 5/23/2014  Smokeless tobacco: Never Used                      Alcohol use:  No                 Medications (Active prior to today's visit):    Current Facility-Administered Medications:  diphenhydrAMINE (BENADRYL) cap/tab 25 mg 25 with minerals (ADULT) tab 1 tablet 1 tablet Oral Daily   ipratropium-albuterol (DUONEB) nebulizer solution 3 mL 3 mL Nebulization Q6H PRN   Fluticasone Furoate-Vilanterol (BREO ELLIPTA) 100-25 MCG/INH inhaler 1 puff 1 puff Inhalation Daily   Normal Saline Dictated by (CST): Catrachito Walter MD on 9/04/2018 at 13:10     Approved by (CST): Catrachito Walter MD on 9/04/2018 at 13:14            Vitals   09/05/18 2030   BP: 132/55   Pulse: 101   Resp: 22   Temp: 98.2 °F (36.8 °C)       PHYSICAL EXAM:     T Panel (14)      Lactic Acid, Plasma      Prothrombin Time (PT)      PTT, Activated      Legionella urine Ag serogrp 1 Once      Streptococcus Pneumoniae Ag, Urine Once      Lactic Acid 3 Hr Post Positive      Vancomycin Trough, Serum      Magnesium      CB Basic Metabolic Panel (8)      Basic Metabolic Panel (8)      Magnesium      Digoxin (Lanoxin)      Magnesium      CBC With Differential With Platelet      Basic Metabolic Panel (8)      CBC With Differential With Platelet      Basic Metabolic Panel (8) 70 mL 0      Sig: Take 2.5 mL (125 mg total) by mouth every 6 (six) hours.              9/5/2018  Liliana Jones MD

## 2018-09-06 NOTE — PLAN OF CARE
Problem: Patient/Family Goals  Goal: Patient/Family Short Term Goal  Patient's Short Term Goal: Stop coughing     Interventions:   - Monitor vitals  - Administer antibiotics   - See additional Care Plan goals for specific interventions    Outcome: Progress

## 2018-09-06 NOTE — WOUND PROGRESS NOTE
Wound Care Services  Follow up on the pt's sacral and right heel ulcers,Contact isolation maintained, both have improved, the right heel is a dried out deep tissue injury, that is dry and intact, sacrum with  slough tissue, increased pink/red tis

## 2018-09-06 NOTE — PROGRESS NOTES
[facility]    Progress Note      Assessment and Plan:   1. Acute on chronic respiratory failure–my strong suspicion is that the patient has recurrent pneumonia related to her achalasia.   All of this is superimposed on her COPD, kyphoscolio 09/06/2018   CREATSERUM 0.39 09/06/2018   BUN 6 09/06/2018    09/06/2018   K 3.4 09/06/2018    09/06/2018   CO2 31 09/06/2018   GLU 88 09/06/2018   CA 8.5 09/06/2018   MG 1.5 09/06/2018     Chest x-ray– no change    Kusum Jones MD  [facility]

## 2018-09-06 NOTE — PROGRESS NOTES
Naima Minor is a 74year old   female with significant history of scoliosis, degenerative disease, fibromyalgia, depression and an anxiety who presented to the hospital with pneumonia and multiple medical condition.   The patient seen t Cigarettes     Quit date: 5/23/2014  Smokeless tobacco: Never Used                      Alcohol use:  No                 Medications (Active prior to today's visit):    Current Facility-Administered Medications:  Morphine Sulfate (Concentrate) concentrated nebulizer solution 3 mL 3 mL Nebulization Q6H PRN   Fluticasone Furoate-Vilanterol (BREO ELLIPTA) 100-25 MCG/INH inhaler 1 puff 1 puff Inhalation Daily   Normal Saline Flush 0.9 % injection 3 mL 3 mL Intravenous PRN   Heparin Sodium (Porcine) 5000 UNIT/ML Brendan Price MD on 9/06/2018 at 8:20          Xr Chest Ap Portable  (cpt=71045)    Result Date: 9/4/2018  CONCLUSION:  1. Borderline cardiomegaly. Tortuous atherosclerotic aorta. 2. Emphysematous changes.  Chronic coarse crepitation markings with loc tolerance.       Orders This Visit:    Orders Placed This Encounter      Basic Metabolic Panel (8)      CBC With Differential With Platelet      Urinalysis with Culture Reflex STAT      Lactic Acid, Plasma      Magnesium      BNP (Brain Natriuretic Peptide) Metabolic Panel (8)      CBC With Differential With Platelet      CBC With Differential With Platelet      Basic Metabolic Panel (8)      Magnesium      Urinalysis, Routine Once      Potassium      Magnesium      CBC With Differential With Platelet      Ba 2    Meds This Visit:    Signed Prescriptions Disp Refills    hydrocodone-acetaminophen 7.5-325 MG Oral Tab 30 tablet 0      Sig: Take 1 tablet by mouth every 6 (six) hours as needed for Pain.       LORazepam 0.5 MG Oral Tab 30 tablet 0      Sig: Take 1 tab

## 2018-09-06 NOTE — CM/SW NOTE
Sw contacted [facility] who stated that  did not want to sign consents for hospice yet. Per hospice,  stated that once pt gets to [facility] for rehab, he will think of calling hospice.     3:45PM: Per MDO, pt  agreeable w/ hospic

## 2018-09-06 NOTE — PLAN OF CARE
Problem: Patient/Family Goals  Goal: Patient/Family Long Term Goal  Patient's Long Term Goal: Get rid of this pneumonia     Interventions:  - Administer antibiotics  - See additional Care Plan goals for specific interventions    Outcome: Progressing  Neb t safety including physical limitations  - Instruct pt to call for assistance with activity based on assessment  - Modify environment to reduce risk of injury  - Provide assistive devices as appropriate  - Consider OT/PT consult to assist with strengthening/ ulcer development  - Assess and document skin integrity  - Monitor for areas of redness and/or skin breakdown  - Initiate interventions, skin care algorithm/standards of care as needed   Outcome: Progressing  Daily dressing per wound care orders.      Probl

## 2018-09-06 NOTE — SLP NOTE
ADULT SWALLOWING EVALUATION    ASSESSMENT    ASSESSMENT/OVERALL IMPRESSION:  Patient alert, very talkative. Spouse present at [facility]. Patient with recurrent pneumonia, suspect related to achalasia per pulmonary. Patient participated in [facility] on 7/19/18.   Jerson Blankenship counseling/discussion    Dehydration    Hypomagnesemia    Major depressive disorder, recurrent episode, moderate (HCC)    Delirium superimposed on dementia      Past Medical History  Past Medical History:   Diagnosis Date   • Ankle fracture, left 1985    O Elevation Timing: Appears intact  Laryngeal Elevation Strength: Appears intact  Laryngeal Elevation Coordination: Appears intact  (Please note: Silent aspiration cannot be evaluated clinically.  Videofluoroscopic Swallow Study is required to rule-out silent

## 2018-09-07 NOTE — DIETARY NOTE
ADULT NUTRITION REASSESSMENT     Pt is at moderate nutrition risk. Pt does not meet malnutrition criteria.       RECOMMENDATIONS TO MD:  See nutritional interventions    CRITERIA FOR MALNUTRITION DIAGNOSIS:  Criteria for non-severe malnutrition diagnosis: 8/23 Update: Po intake decreased. Pt orders only 2 items ( fruits) on meals and Intake of ONS has decreased. Diet downgrade to puree likely has affected intake. However, wt has been surprisingly same/stable.    8/31 Update: GI on consult for abnormal eso 08/16/18 0619 47.1 kg (103 lb 12.8 oz)   08/15/18 0723 48 kg (105 lb 12.8 oz)   08/13/18 0643 49.1 kg (108 lb 3.2 oz)           08/09/18 0640 43.7 kg (96 lb 4.8 oz)   08/09/18 0038 40.8 kg (90 lb)     GASTROINTESTINAL: dysphagia and swallow evaluation no [id number]

## 2018-09-07 NOTE — CONSULTS
[place] [facility] - [facility]  Palliative Care Follow Up    Brennen Reyna Patient Status:  Inpatient    1945 MRN [id number]   Location [facility] 5SW/SE Attending Leydi Velazqeuz Day # 34 PCP Ai Sevilla DO     Date of ipratropium-albuterol (DUONEB) nebulizer solution 3 mL, 3 mL, Nebulization, QID  •  Vancomycin HCl (VANCOCIN) 1,000 mg in sodium chloride 0.9 % 250 mL IVPB add-vantage, 1,000 mg, Intravenous, Q12H  •  Metoclopramide HCl (REGLAN) injection 10 mg, 10 mg, Int 500 mg, Oral, Q6H PRN    Prior to Admission Medications:  Morphine Sulfate, Concentrate, 100 MG/5ML Oral Solution Take 0.3 mL (6 mg total) by mouth every 4 (four) hours as needed (pain/shortness of breath for comfort).    LORazepam 0.5 MG Oral Tab Take 0.5 0  Non-verbal signs of pain present: NO    Physical Exam:  General: Alert and in no apparent distress. Frail, chronic-ill appearing  HEENT: No focal deficits. Cardiac: Regular rate and rhythm, S1, S2 normal, no murmur, rub or gallop.   Lungs: Coarse breath -Controlled  -Continue Roxanol 5 mg po Q 4 hrs prn dyspnea/pain for comfort (RX left in chart) and  Morphine 2mg IVP Q 4 hrs prn severe pain/if Roxanol not helping.      Goals of care, counseling/discussion  -Pt is DNR, DNI, no g-tube and continue support

## 2018-09-07 NOTE — PROGRESS NOTES
[facility]     Progress Note        Jackelyn Alcaraz Patient Status:  Inpatient    1945 MRN [id number]   Location [facility] 5SW/SE Attending Jennifer Gutierrez, [address] [facility] Day # 34 PCP Filipe Marion, DO       Subjective:   Pa 100 mg 100 mg Oral Nightly   OLANZapine (ZYPREXA) tab 2.5 mg 2.5 mg Oral Nightly   Vancomycin HCl (FIRVANQ) 50 MG/ML oral solution 125 mg 125 mg Oral Daily   Alum & Mag Hydroxide-Simeth (MAALOX) oral suspension 30 mL 30 mL Oral QID PRN   DilTIAZem HCl ER C respiratory failure  2. Pneumonia likely secondary to aspiration  3. COPD  4. Severe kyphosis  5. CDAD  6. Protein calorie malnutrition  7. Anxiety  8. Dementia  9.   Hiatal hernia     Plan   -Patient with evidence of acute hypoxemic respiratory fail

## 2018-09-07 NOTE — PLAN OF CARE
Problem: Patient Centered Care  Goal: Patient preferences are identified and integrated in the patient's plan of care  Interventions:  - What would you like us to know as we care for you?  My  cares for me   - Provide timely, complete, and accurate i

## 2018-09-07 NOTE — PROGRESS NOTES
[place] [facility] - [facility]    Progress Note    Naima Minor Patient Status:  Inpatient    1945 MRN [id number]   Location [facility] 5SW/SE Attending Leydi Velazquez Day # 34 PCP Olu Andres DO        Subjective: achalasia. Have consulted GI Dr Eleuterio Preston. Discussed possible candida and possible EGD when stable from Pulm standpoint.   - apprec GI consult. Cp resolved with fluconazole  . Cont day #6  - cont ppi   - limit norco      3.     Constipation - seen on CT.  Cont 09/07/2018   PHOS 3.1 08/14/2018   TROP 0.01 08/28/2018   CK 78 09/28/2017   B12 927 (H) 10/02/2017       Xr Chest Ap Portable  (cpt=71045)    Result Date: 9/6/2018  CONCLUSION:  1. Mild cardiomegaly. Tortuous aorta.  2. Emphysematous changes with chronic p

## 2018-09-07 NOTE — PROGRESS NOTES
INFECTIOUS DISEASE PROGRESS NOTE    Celso Frankel Patient Status:  Inpatient    1945 MRN [id number]   Location [facility] 5SW/SE Attending Caroline, [facility] Carla Day # 34 PCP Brandon Vidales DO     Subjective:  Awake, on 3L NC. aorta ([facility] [address]     H/O kyphoplasty     Depression, major, in partial remission ([address]     Protein-calorie malnutrition, mild (HCC)     BMI less than 19,adult     Decubitus ulcer of right heel, stage 1     Kyphosis (acquired) (postural)     Depression     Short today (8/23). Patient had increased abdominal pain on 8/22, CT abd/pelvis with constipation, given enemas with soft BMs yesterday. Abdominal pain improved today. Patient does not report any SOB, no abdominal pain, no dysuria.  States that she is feeling bet

## 2018-09-07 NOTE — PROGRESS NOTES
[facility]    Progress Note    Jefrycarrie Martinss Patient Status:  Inpatient    1945 MRN [id number]   Location [facility] 5SW/SE Attending Leydi Velazquezissa Day # 34 PCP Loly Meek DO        Subjective: CT repeated  showing evidence of achalasia. Have consulted GI Dr Nitin Carmichael. Discussed possible candida and possible EGD when stable from Pulm standpoint.   - apprec GI consult.  Cp resolved with fluconazole  . Cont day #6  - cont ppi   - limit norco      3.    03/29/2018   CRP 0.5 03/29/2018   MG 1.8 09/07/2018   PHOS 3.1 08/14/2018   TROP 0.01 08/28/2018   CK 78 09/28/2017   B12 927 (H) 10/02/2017       Xr Chest Ap Portable  (cpt=71045)    Result Date: 9/6/2018  CONCLUSION:  1. Mild cardiomegaly.  Tortuous aorta

## 2018-09-08 NOTE — PROGRESS NOTES
[facility]    Progress Note      Assessment and Plan:   1. Acute on chronic respiratory failure–my strong suspicion is that the patient has recurrent pneumonia related to her achalasia.   All of this is superimposed on her COPD, kyphoscolio

## 2018-09-08 NOTE — TELEPHONE ENCOUNTER
On-call note: I have a call from [facility].  Patient has been admitted from the hospital for hospice due to respiratory failure. She just wanted to go over orders with me.   She wanted to make sure you will be following the patient at [facility]

## 2018-09-08 NOTE — DISCHARGE SUMMARY
Hospital Discharge Diagnoses: resp failur    Lace+ Score: 82  59-90 High Risk  29-58 Medium Risk  0-28   Low Risk. TCM Follow-Up Recommendation:  LACE < 29: Low Risk of readmission after discharge from the hospital. No TCM follow-up needed.    Pt leaving

## 2018-09-08 NOTE — PROGRESS NOTES
Ivan Barron is a 74year old   female with significant history of scoliosis, degenerative disease, fibromyalgia, depression and an anxiety who presented to the hospital with pneumonia and multiple medical condition.   The patient seen t Surgical History:  2009: CARPAL TUNNEL RELEASE      Comment:  per NG; L CTS surgery  2009: CATARACT EXTRACTION  1-: COLONOSCOPY & POLYPECTOMY  No date: FRACTURE SURGERY      Comment:  LEFT LEG   01/16/2013: IR KYPHOPLASTY      Comment:  Kyphoplasty, [facility]) 50 MG/ML oral solution 125 mg 125 mg Oral Daily   Alum & Mag Hydroxide-Simeth (MAALOX) oral suspension 30 mL 30 mL Oral QID PRN   DilTIAZem HCl ER Coated Beads (CARDIZEM CD) 24 hr cap 180 mg 180 mg Oral Daily   guaiFENesin (ROBITUSSIN) 100 MG/5ML 08/14/2018    TROP 0.01 08/28/2018    CK 78 09/28/2017    B12 927 (H) 10/02/2017         Imaging  Xr Chest Ap Portable  (cpt=71045)    Result Date: 9/6/2018  CONCLUSION:  1. Mild cardiomegaly. Tortuous aorta.  2. Emphysematous changes with chronic pulmonary nightly. 3.  Continue Zyprexa 2.5 mg nightly. 4.  Continue Ativan 0.25 mg 3 times daily with 0.25 mg TID PRN. 5.  Follow-up during this admission and adjust medication up depends on the tolerance.       Orders This Visit:  Orders Placed This Encounter Potassium      Magnesium      Basic Metabolic Panel (8)      CBC With Differential With Platelet      Basic Metabolic Panel (8)      CBC With Differential With Platelet      Basic Metabolic Panel (8)      CBC With Differential With Platelet      CBC With Potassium      Magnesium      Blood Culture FREQ X 2      GI Stool panel by PCR Once      Sputum culture Once      MRSA Screen by PCR Once      Urine Culture, Routine Once      Urine Culture, Routine Once      Blood Culture FREQ X 2      Meds This Visit:

## 2018-09-08 NOTE — DOWNTIME EVENT NOTE
The EMR was down for 6 hours on 9/8/2018.        The following information was re-entered into the system     No Changes    Karely S  9/8/2018

## 2018-09-08 NOTE — DISCHARGE SUMMARY
[facility]    PATIENT'S NAME: Charity Flor   ATTENDING PHYSICIAN: Carmine Velazquez MD   PATIENT ACCOUNT#:   [id number]    LOCATION:  [address] [place] [address] RECORD #:   [id number]       YOB: 1945  ADMISSION DATE:       08/0 including the patient, felt it was time for her to leave the hospital because she is not able to improve. I believe she is at or near the end of her life and she is Do Not Resuscitate.   Her  and I, Palliative Care, Social Work, had numerous convers every 6 hours as needed. 3.   Aspirin 81 mg daily. 4.   Fioricet for headaches 1 tablet every 6 hours as needed as a comfort measure. 5.   Ativan 0.25 mg 3 times a day and also every 6 hours as needed. I wrote 2 separate prescriptions.   6.   Leonard López

## 2018-09-08 NOTE — CM/SW NOTE
CHARANJIT received call from RN who states the pt is being prepared for dc to [facility] sn[facility] with [facility] hospice. RN states the pt is on 3L O2, isolation for hx cdiff (Aug 2018). [place] hospice liaison is present and prepared their arrangements.  CHARANJIT has been re

## 2018-09-09 NOTE — TELEPHONE ENCOUNTER
FYI I do not go to [facility]. I only go to [facility] [address]. They will be notified.

## 2019-03-19 ENCOUNTER — MED REC SCAN ONLY (OUTPATIENT)
Dept: FAMILY MEDICINE CLINIC | Facility: CLINIC | Age: 74
End: 2019-03-19

## 2019-03-20 NOTE — PROGRESS NOTES
[place] [facility] treatment order update change in condition from 8/6/18-10/4/18, forms faxed, fax was successful

## 2019-10-24 NOTE — PROGRESS NOTES
HPI:    Patient ID: Franck Demarco is a 66year old female.     HPI    Review of Systems         Current Outpatient Prescriptions:  DICYCLOMINE HCL 10 MG Oral Cap TAKE 1 CAPSULE TWICE DAILY Disp: 180 capsule Rfl: 1   [START ON 4/9/2017] hydrocodone-acetam prescriptions requested or ordered in this encounter    Imaging & Referrals:  None       RV#[id number] normal...

## 2020-02-18 NOTE — PROGRESS NOTES
1. Prediabetes: recommend lifestyle changes. 2. ASCVD 1.3%. No indication to be on statin. Results [facility]. [facility]    Progress Note    Miguel Angel Soto Patient Status:  Inpatient    1945 MRN [id number]   Location [facility] 5SW/SE Attending Edi Belcher MD   Hosp Day # 2 PCP Myrtle López DO     Subjective:   Subjective:  Fe positioning. 2.  Chronic interstitial changes are present. 3.  Chronic compression deformities with kyphoplasty changes are present throughout the thoracic and lumbar spine.        Ekg 12-lead    Result Date: 9/28/2017  ECG Report  Interpretation  ---------

## 2022-04-02 NOTE — PROGRESS NOTES
INFECTIOUS DISEASE PROGRESS NOTE    Lidia Gerber Patient Status:  Inpatient    1945 MRN [id number]   Location [facility] 5SW/SE Attending Caroline, [facility] Day # 28 PCP Owen Spence DO     Subjective:  Awake, on 4L NC. remission (HCC)     Protein-calorie malnutrition, mild (HCC)     BMI less than 19,adult     Decubitus ulcer of right heel, stage 1     Kyphosis (acquired) (postural)     Depression     Short-term memory loss     Closed left hip fracture, initial encounter abd/pelvis with constipation, given enemas with soft BMs yesterday. Abdominal pain improved today. Patient does not report any SOB, no abdominal pain, no dysuria. States that she is feeling better.  ID consulted.     # Increasing leukocytosis with no fevers normal

## 2022-10-04 NOTE — TELEPHONE ENCOUNTER
Unable to mail Narcotic scripts to pts, pt will need to pick script up.  Pt's  will be pick script up Myalgia Monitoring: I explained this is common when taking isotretinoin. If this worsens they will contact us.

## 2023-09-05 NOTE — CONSULTS
[address] [facility] Patient Status:  Inpatient    1945 MRN [id number]   Location [facility] 5SW/SE Attending Candis Olivia MD   Hosp Day # 2 PCP Corey Stover DO     Date of C abdominal pain, n/v and moved her bowels 2 days ago. Denies any constipation/diarrhea issues. +inc B/B and wears depends. She has documented decubitus ulcers on left buttock/R heel and left elbow which were POA.  Mood has been ok per pt report, denies any d limited although says she would not qualify for Medicaid. Pt/ understand her rehab potential may be limited by her poor baseline functional status. I recommended community palliative care to follow her upon discharge to rehab and they are agreeable. Prior to Admit: lives with   Does Patient Live Alone: no  Is Patient Confused: yes  Occupational History: Retired,     Substance History:  Smoking Status: former  Hx of Substance Use/Abuse: no    Episcopal/Cultural Information  Episcopal A Oral, Q4H PRN  •  latanoprost (XALATAN) 0.005 % ophthalmic solution 1 drop, 1 drop, Both Eyes, Nightly  •  Memantine HCl (NAMENDA) tab 10 mg, 10 mg, Oral, BID  •  Pantoprazole Sodium (PROTONIX) EC tab 40 mg, 40 mg, Oral, QAM AC  •  QUEtiapine Fumarate (SER Overall the aeration appears somewhat improved from July 18, 2018. 3. Markings remain prominent with basilar fibrosis. Dictated by (CST): Ariella Cosme MD on 7/19/2018 at 12:22     Approved by (CST):  Ariella Cosme MD on 7/19/2018 at 12:27 healthcare power of : Yes  Advance Directive: Copy on chart  Type of Healthcare Directive: Health care treatment directive  Healthcare Agent Appointed: Yes  Healthcare Agent's Name: Mendy Alvarado  Agent's Phone Number: [phone]  Pre-existi consult, which included all of the following:direct face to face contact, history taking, physical examination, and >50% was spent counseling and coordinating care.     Discussed today's visit with Dr. Sarah Martinez and Vinicio Hoffman RN    I will continue to follow cli Tetracycline Counseling: Patient counseled regarding possible photosensitivity and increased risk for sunburn.  Patient instructed to avoid sunlight, if possible.  When exposed to sunlight, patients should wear protective clothing, sunglasses, and sunscreen.  The patient was instructed to call the office immediately if the following severe adverse effects occur:  hearing changes, easy bruising/bleeding, severe headache, or vision changes.  The patient verbalized understanding of the proper use and possible adverse effects of tetracycline.  All of the patient's questions and concerns were addressed. Patient understands to avoid pregnancy while on therapy due to potential birth defects.

## 2024-04-03 NOTE — TELEPHONE ENCOUNTER
4/3/2024       RE: Lisa Lopez  [address]  [place] [facility] [place] [address]     Dear Colleague,    Thank you for referring your patient, Lisa Lopez, to the [facility] at [facility]. Please see a copy of my visit note below.        Video-Visit Details    Type of service:  Video Visit     Originating Location (pt. Location): Home    Distant Location (provider location):  On-site  Platform used for Video Visit: [facility]  Start time: 1015am  End time: 1027am    CC: UTI symptoms    HPI:  Lisa Lopez is a very pleasant 78 year old female who presents in follow-up of the above.   Initially seen 9/29/23: Last +UC in 2021 (e coli). Has had multiple e-Visits recently and treated with abx without culture. Has had courses of Omnicef, Bactrim, Macrobid. Also treated for vaginal candida.     Notes burning, freq, small vol, sense of incomplete emptying intermittently. Nocturia x 1-3. Symptoms improve quickly with abx.     C diff in 2001.     1/3/23  Nocturia x 3-4  First void more difficult in AM--needs to void frequently to feel empty.   UC x 3 in interim:  10/10/23: >100k e coli, 11/6/23: no growth, 12/22/23: low colony mixed emma  Continues with Vit C once daily and estrogen cream (has helped with some of the vaginal and urethral discomfort).     TODAY 4/3/24  Started having hot flashes after starting Mirabegron/topical estrogen.  Has stopped both of these for now and hot flashes resolved.   TSH was elevated recently.   Started PFPT, had an incident with a large dog and resulted in back/SI pain.     Past Medical History:   Diagnosis Date    Cough 4/15/2014    Dysthymia 4/15/2014    Essential hypertension, benign 4/15/2014    Hypercholesteremia 4/15/2014    Paroxysmal supraventricular tachycardia (H24) 4/15/2014       Past Surgical History:   Procedure Laterality Date    ABDOMEN SURGERY  1972    Tubal ligation    APPENDECTOMY  Unsure     Per Suhas/RN , pt will be discharge from skilled RN today pt wound is healed and [facility]. Removed when had vaginal hysterectomy.    COLONOSCOPY N/A 3/23/2017    Procedure: COMBINED COLONOSCOPY, SINGLE OR MULTIPLE BIOPSY/POLYPECTOMY BY BIOPSY;  Surgeon: Devante Sanchez MD;  Location: [facility]    HYSTERECTOMY, PAP NO LONGER INDICATED         Social History     Socioeconomic History    Marital status:      Spouse name: Not on file    Number of children: Not on file    Years of education: Not on file    Highest education level: Not on file   Occupational History     Employer: RETIRED   Tobacco Use    Smoking status: Former     Years: 5     Types: Cigarettes     Start date: 1963     Quit date: 1968     Years since quittin.2    Smokeless tobacco: Never    Tobacco comments:     Smoked [age] yo.   Vaping Use    Vaping Use: Never used   Substance and Sexual Activity    Alcohol use: Yes     Alcohol/week: 0.0 standard drinks of alcohol     Comment: 1-2 glasses of wine or beer a week.    Drug use: No    Sexual activity: Yes     Partners: Male   Other Topics Concern     Service Not Asked    Blood Transfusions Not Asked    Caffeine Concern No     Comment: not caffeine     Occupational Exposure Not Asked    Hobby Hazards Not Asked    Sleep Concern Not Asked    Stress Concern Not Asked    Weight Concern Not Asked    Special Diet No     Comment: regular diet     Back Care Not Asked    Exercise Yes     Comment: 3 times a week at gym     Bike Helmet Not Asked    Seat Belt Not Asked    Self-Exams Not Asked    Parent/sibling w/ CABG, MI or angioplasty before 65F 55M? Yes     Comment: Mother; heart attack age 53. brother Renato age 53 angioplast   Social History Narrative    Not on file     Social Determinants of Health     Financial Resource Strain: Not on file   Food Insecurity: Not on file   Transportation Needs: Not on file   Physical Activity: Not on file   Stress: Not on file   Social Connections: Not on file   Interpersonal Safety: Not on file   Housing Stability: Not on file        Family History   Problem Relation Age of Onset    Diabetes Father 60         68 yo    Asthma Father         Mild    Heart Disease Mother          68 yo     Hypertension Mother         , heart attack, age 67    Hyperlipidemia Mother     Heart Disease Brother          77 yo    Heart Disease Brother          76 yo    Heart Disease Brother          76 yo     Heart Disease Brother 67        Triple bypass    Family History Negative Sister          60 yo MVA     Family History Negative Sister     Family History Negative Daughter     Family History Negative Daughter     Diabetes Sister     Hypertension Sister         , car accident, age 61    Hyperlipidemia Sister     Diabetes Brother     Hypertension Brother         , car accident, age 61    Hyperlipidemia Brother     Hypertension Sister     Hyperlipidemia Sister     Hypertension Brother          following heart surgery, age 77    Hyperlipidemia Brother     Hypertension Brother     Hyperlipidemia Brother     Hypertension Brother         ,stroke after heart surgery    Hyperlipidemia Brother     Cerebrovascular Disease Brother         After heart surgery, from stroke, age 77    Other Cancer Daughter         Brain tumor, ; surgery.    Colon Cancer No family hx of        Allergies   Allergen Reactions    Bupropion      Night sweats    Sertraline      Dizziness and hot flashes       Current Outpatient Medications   Medication Sig Dispense Refill    alendronate (FOSAMAX) 70 MG tablet TAKE 1 TABLET(70 MG) BY MOUTH EVERY 7 DAYS 12 tablet 3    amLODIPine (NORVASC) 5 MG tablet TAKE 1 TABLET(5 MG) BY MOUTH DAILY 90 tablet 3    aspirin 81 MG tablet Take 81 mg by mouth daily  30 tablet     carvedilol (COREG) 12.5 MG tablet TAKE 1/2 TABLET(6.25 MG) BY MOUTH TWICE DAILY WITH MEALS 90 tablet 1    Cholecalciferol (VITAMIN D) 2000 UNITS tablet Take 2,000 Units by mouth daily 100 tablet 3     estradiol (ESTRACE VAGINAL) 0.1 MG/GM vaginal cream Apply small amount to the vaginal opening and urethra M, W, F @ h.s. 42.5 g 3    fish oil-omega-3 fatty acids (OMEGA 3) 1000 MG capsule Take 1 g by mouth 2 times daily  90 capsule     Lactobacillus (PROBIOTIC ACIDOPHILUS) CAPS       LORazepam (ATIVAN) 0.5 MG tablet TAKE 1/2 TABLET(0.25 MG) BY MOUTH DAILY 45 tablet 0    Lutein 20 MG TABS       mirabegron (MYRBETRIQ) 25 MG 24 hr tablet Take 1 tablet (25 mg) by mouth daily 90 tablet 4    Multiple Vitamins-Minerals (EYE VITAMINS & MINERALS) TABS       omeprazole (PRILOSEC) 20 MG DR capsule Take 1 capsule (20 mg) by mouth daily 90 capsule 3    simvastatin (ZOCOR) 20 MG tablet TAKE 1/2 TABLET BY MOUTH EVERY NIGHT AT BEDTIME 90 tablet 2    vitamin C w/RADHA HIPS 500 MG tablet Take 1 tablet (500 mg) by mouth daily      zinc gluconate 50 MG tablet Take 1 tablet (50 mg) by mouth daily       No current facility-administered medications for this visit.         PEx:   not currently breastfeeding.    PSYCH: NAD  EYES: EOMI  MOUTH: MMM  NEURO: AAO x3      A/P: Lisarosalio Lopez is a 78 year old female with UTI symptoms (Bacterial vs irritative).  -UA/UC prior to an abx course given C diff hx  -Estrogen cream three times a week near urethra (pea-sized amount) can be restarted alone if irritative symptoms.   -PFPT to continue  -Stop mirabegron; did not tolerate.   -follow-up with Dr. Durham about elevated TSH.  -follow-up PRN        Vee Zaragoza PA-C  [facility]        25 minutes spent on the date of the encounter doing chart review, review of outside records, review of test results, interpretation of tests, patient visit and documentation

## (undated) NOTE — IP AVS SNAPSHOT
Patient Demographics     Address  [address]  [facility] [address] Phone  [phone] (Home) *Preferred*  [phone] [facility]) E-mail Address  [email or web address]      Emergency Contact(s)     Name Relation Home Work [address] [employer] OLANZapine 2.5 MG Tabs  Commonly known as:  ZYPREXA  Take 1 tablet (2.5 mg total) by mouth nightly. PEG 3350 Pack  Commonly known as:  MIRALAX  Take 17 g by mouth daily as needed.      Vancomycin HCl 50 MG/ML Solr  Commonly known as:  FIRVANQ  Take 2.5 Budesonide-Formoterol Fumarate 160-4.5 MCG/ACT Aero  Commonly known as:  SYMBICORT     Butalbital-APAP-Caffeine -40 MG Tabs  Commonly known as:  FIORICET  Take 1 tablet by mouth every 6 (six) hours as needed.   Notes to patient:  TAKE AS NEEDED      c acetaminophen 325 MG Tabs  Commonly known as:  TYLENOL  Notes to patient:  TAKE AS NEEDED       Take 2 tablets (650 mg total) by mouth every 6 (six) hours as needed.    Dalila Marrufo MD         AEROCHAMBER PLUS CHETAN-VU Misc              Albuterol Sulfate H Itching (Give 30 mins prior to vanc infusion). Irineo Harp MD         guaiFENesin 100 MG/5ML Soln  Commonly known as:  ROBITUSSIN      Take 5 mL (100 mg total) by mouth every 4 (four) hours as needed.    Irineo Harp MD         ipra Next dose due:  8-21-18 MORNING       TAKE 1 CAPSULE EVERY DAY   Elizabeth Don DO         Vancomycin HCl 50 MG/ML Solr  Commonly known as:  FIRVANQ      Take 2.5 mL (125 mg total) by mouth daily for 7 days.   Stop taking on:  9/16/2018   Guerline TOMAS [id number] collagenase (SANTYL) 250 UNIT/GM ointment 09/08/18 0946 Given      [id number] diphenhydrAMINE (BENADRYL) cap/tab 25 mg 09/08/18 0923 Given      [id number] ipratropium-albuterol (DUONEB) nebulizer solution 3 mL 09/07/18 1700 Given      [id number] ip Microbiology Results (All)     Procedure Component Value Units Date/Time    Blood Culture FREQ X 2 [[id number]] Collected:  08/24/18 1132    Order Status:  Completed Lab Status:  Final result Updated:  08/29/18 1300    Specimen:  Blood,peripheral      Blood Shig Txn 1/2 Prod E. Coli Pcr Negative     Shig/Enteroinvasive E.  Coli Pcr Negative     Cryptosporidium Pcr Negative     Cyclospora Cayetanensis Pcr Negative     Entamoeba Histolytica Pcr Negative     Giardia Lamblia Pcr Negative     Adenovirus F 40/41 P well.  She was treated for aspiration pneumonia in the hospital and discharged to [place] for continued treatment. She apparently went home on August 4. Her  apparently cares for her at home.   She was admitted from the emergency room today with MEDICATIONS:  Prior to admission:  Santyl ointment applied to sacral decubitus daily, hydrocodone/acetaminophen 7.5/325 one tablet every 6 hours as needed for pain, tolterodine tartrate 2 mg daily, Fioricet 50/325/40 mg 1 tablet every 6 hours as needed for VITAL SIGNS:  Temperature on presentation in the emergency room was 98.8 with pulse 111, respiratory rate 24, blood pressure 148/88, and O2 saturation was 89%.   Repeat temperature is 98.5 with pulse 106, respiratory rate 22, blood pressure 150/92, O2 satur will change to meropenem and vancomycin and nebulizer treatments. Keep n.p.o.  2.   Pneumonia with sepsis:  The patient received fluid bolus in the emergency room. We will continue at 150 mL/hour for now. Recheck lactic acid level in 3 hours.   Continue Author:  Basilia Garg MD Service:  Gastroenterology Author Type:  Physician    Filed:  8/30/2018  8:51 PM Date of Service:  8/30/2018  5:14 PM Status:  Signed    :  Basilia Garg MD (Physician)     Consult Orders    1.  IP Co · Ongoing concern for decubitus ulcers now involving right heel, sacrum  · Ongoing significant oxygen needs. On oxygen 5 LPM today 8/30/2018. In addition to the pneumonia concern, she has been complaining of severe substernal chest pain.   Recent esopha 11/27/17 : 99 lb 12.8 oz (45.3 kg)  11/22/17 : 96 lb 12.8 oz (43.9 kg)  11/21/17 : 96 lb 12.8 oz (43.9 kg)  11/15/17 : 95 lb 3.2 oz (43.2 kg)  11/07/17 : 98 lb (44.5 kg)  10/27/17 : 98 lb 12.8 oz (44.8 kg)  10/19/17 : 101 lb 3.2 oz (45.9 kg)        Impress Zeke 86  8/30/2018             Past Medical History  Past Medical History:   Diagnosis Date   • Ankle fracture, left 1985    ORIF   • Anxiety state    • Back problem    • Carpal tunnel syndrome 2009    L CTS surgery   • Chronic fatigue    • Vancomycin HCl (VANCOCIN) 1,250 mg in sodium chloride 0.9 % 500 mL IVPB 1,250 mg Intravenous Q12H   OLANZapine (ZYPREXA) tab 2.5 mg 2.5 mg Oral Nightly   Meropenem (MERREM) 500 mg in sodium chloride 0.9% 100 mL  mg Intravenous Q8H   Vancomycin HCl ( calcitonin, salmon, 200 UNIT/ACT Nasal Solution 1 spray by Nasal route daily. DICYCLOMINE HCL 10 MG Oral Cap TAKE 1 CAPSULE TWICE DAILY   NEXIUM 24HR 20 MG Oral Tab EC Take 20 mg by mouth 2 (two) times daily.    BuPROPion HCl ER, SR, 150 MG Oral Tablet 12 review of systems is as above otherwise negative. Physical Exam:   Blood pressure 126/85, pulse 98, temperature 98 °F (36.7 °C), temperature source Axillary, resp. rate 20, weight 101 lb 1.6 oz (45.9 kg), SpO2 93 %.     GENERAL: Very thin, practically ca 8/30/2018 at 14:24     Approved by (CST): Tania Laura MD on 8/30/2018 at 14:31          Xr Chest Ap/pa (1 View) (cpt=71045)    Result Date: 8/29/2018  CONCLUSION:  1.  Emphysema with multifocal airspace disease, worst in the right upper lobe, with a constipation/fecal retention. Mildly distended fluid-filled small bowel with distal small bowel fecal stasis. Question small bowel ileus versus fluid-filled small bowel related to fecal stasis. 7. S-shaped scoliosis and advanced multilevel spondylosis.  Acc Filed:  9/6/2018  4:04 PM Date of Service:  9/6/2018  3:56 PM Status:  Addendum    :  ABRAM Bejarano (Speech and Language Pathologist)    Related Notes:  Original Note by ABRAM Bejarano (Speech and Language Pathologist) filed at 9/6/ Medication Administration Recommendations: Crushed in puree  Treatment Plan/Recommendations: Aspiration precautions  Discharge Recommendations/Plan: Undetermined    HISTORY   MEDICAL HISTORY  Reason for Referral: R/O aspiration    Problem List  Principal P Method of Presentation: Straw;Consecutive swallows  Patient Positioning: Upright (severe kyphosis, therefore slumped forward in bed)    Oral Phase of Swallow:  Within Functional Limits  Bolus Retrieval: Intact  Bilabial Seal: Intact  Bolus Formation: Intact 8/20/18 on a pureed diet with thin liquids. Patient with episode of emesis on 9/5/18 with subsequent downgrade to nectar-thick liquids per MD.    Patient's spouse somewhat agitated with re-assessment of the swallow fx.   Patient noted to have several necta • Chronic fatigue    • Colon polyp 1-   • Diverticulosis 1-   • Esophageal reflux    • Fibromyalgia    • Glaucoma     BOTH SIDES    • Osteoarthritis    • Spinal stenosis    • Sternal fracture 02/ 2011    from fall        Prior Living Situatio componenet)  FOLLOW UP  Treatment Plan/Recommendations: Aspiration precautions  Follow Up Needed: No  SLP Follow-up Date: 08/18/18    Thank you for your referral.   If you have any questions, please contact Santana Omer MS, CCC/SLP[KB. 1]    [employer]

## (undated) NOTE — IP AVS SNAPSHOT
Patient Demographics     Address  [address]  [facility] [address] Phone  [phone] (Home) *Preferred*  [phone] [facility]) E-mail Address  [email or web address]      Emergency Contact(s)     Name Relation Home Work [address] [employer] sure you understand how and when to take each. * QUEtiapine Fumarate 25 MG Tabs  Commonly known as:  SEROQUEL  Take 1 tablet (25 mg total) by mouth nightly.   What changed:  · medication strength  · how much to take  · how to take this        * This lis Sivan Gerardo MD In 2 weeks. Specialty:  GASTROENTEROLOGY  Contact information:  [address] [facility]  [phone]             Iqra Ambriz MD In 2 weeks.     Specialty:  NEUROLOGY  Contact information:  [facility] Dicyclomine HCl 10 MG Caps  Commonly known as:  BENTYL  Next dose due: Tonight at bedtime      TAKE 1 CAPSULE TWICE DAILY   Flash Castellano MD         Donepezil HCl 10 MG Tbdp  Commonly known as:  ARICEPT ODT  Next dose due:   Tonight at bedtime      nig [id number] Fluticasone Furoate-Vilanterol (BREO ELLIPTA) 200-25 MCG/INH inhaler 1 puff 11/15/17 0945 Given      [id number] Memantine HCl (NAMENDA) tab 10 mg 11/14/17 2041 Given      [id number] Memantine HCl (NAMENDA) tab 10 mg 11/15/17 0900 Given      [id number] CBC WITH DIFFERENTIAL WITH PLATELET [[id number]]  Resulted: 11/15/17 0905, Result status: Final result   Ordering provider:  Hector Mckeon DO  11/14/17 0656 Resulting lab:  [facility]   Narrative:   The following orders were created for panel order CBC Result status: Final result   Ordering provider:  Eladia Sam DO  11/14/17 2304 Resulting lab:  [facility]    Specimen Information    Type Source Collected On   Blood — 11/15/17 0648          Components    Component Value Reference Range Flag Lab Parainfluenza 1 PCR: Negative     Parainlfuenza 2 PCR Negative     Parainlfuenza 3 PCR Negative     Parainlfuenza 4 PCR Negative     Resp Syncytial Virus PCR Negative     Bordetella Pertussis PCR Negative     Chlamydia pneumonia PCR: Negative     Mycopla • Osteoarthritis    • Spinal stenosis    • Sternal fracture 02/ 2011    from fall      Past Surgical History:  2009: CARPAL TUNNEL RELEASE      Comment: justino WEBB CTS surgery  2009: CATARACT EXTRACTION  1-: COLONOSCOPY & POLYPECTOMY  01/16/2013: IR Memantine HCl 10 MG Oral Tab   Yes No   Si (two) times daily. NEXIUM 24HR 20 MG Oral Tab EC   Yes No   Sig: Take 20 mg by mouth 2 (two) times daily. QUEtiapine Fumarate (SEROQUEL) 100 MG Oral Tab   Yes No   Sig: nightly.      QUEtiapine Fumarate ( Cardiovascular:  Normal rate, regular rhythm, no murmur, no edema. Gastrointestinal:  Soft, non-tender, non-distended, normal bowel sounds, no organomegaly. Lymphatics:  No lymphadenopathy neck, axilla, groin. Musculoskeletal: Normal range of motion.   n We will continue dicyclomine twice daily  Bisacodyl as needed for constipation    Glaucoma  Continue latanoprost eyedrop    Prophylaxis  Subcutaneous lovenox    CODE STATUS  Full    Primary care physician  Margie Fajardo, DO    Disposition  Clinical cours presented for generalized weakness and found with acute respiratory failure. The patient seen by me in May 2017 after patient have fracture and patient sent to rehab.   Patient of depression anxiety who has been on Ativan for over 25 here and she is to t 2008: UPPER GI ENDOSCOPY PERFORMED      Comment: done by dr.mc Garcia Many    Family History  Family History   Problem Relation Age of Onset   • Cancer Father      osteo cancer- cause of death       Social History  Smoking status: Former Smoker calcitonin (salmon) (MIACALCIN) nasal solution 1 spray 1 spray Alternating Nares Daily   Dicyclomine HCl (BENTYL) cap 10 mg 10 mg Oral BID PRN   Donepezil HCl (ARICEPT) tab 10 mg 10 mg Oral Nightly   latanoprost (XALATAN) 0.005 % ophthalmic solution 1 drop Comment:Per pt received this medicine and bactrim in ER             not sure which caused reaction.   Bactrim [Sulfametho*    Rash, Shortness of Breath    Comment:Per pt received bactrim and clindamycin in Er not             sure which one caused the reac compatible with chronic microvascular ischemic disease. Small asymmetric hypodensity seen within the left thalamus. Small lacunar infarcts seen within the medulla and jorge. No acute hemorrhage or intracranial mass or mass effect. No midline shift.   CSF SPA CONCLUSION:  1. There is no significant area of narrowing in the right and left ICA as discussed. Mild-to-moderate plaque formation bilaterally as discussed above.            Vital Signs:     Blood pressure 124/85, pulse 106, temperature 98.9 °F (37.2 °C), TSH W Reflex To Free T4      CBC With Differential With Platelet      Prothrombin Time (PT)      Urinalysis, Routine Once      Magnesium      Procalcitonin      Basic Metabolic Panel (8)      CBC With Differential With Platelet      Potassium      Magn through 11/15/2017  5:28 PM)      Physical Therapy Note signed by Tricia Bowden PT at 11/14/2017 12:51 PM  Version 1 of 1    Author:  Tricia Bowden PT Service:  (none) Author Type:  Physical Therapist    Filed:  11/14/2017 12:51 PM Date of Servic weight forwards and to step back so she can stand  with RW/Max Ax2. Pt performed stand pivot transfers with max Ax2 with VC. Trained pt for amb and pt was able to take 4-5 shuffling steps with RW/Mod Ax2.   Pt demonstrated shuffling steps, RLE externally ro O2 Saturation: 82% on RA at rest, RN was informed, 94% with 2 lit%  Liters of O2:  2lit  Heart Rate: 31QPX    AM-PAC '6-Clicks' INPATIENT SHORT FORM - BASIC MOBILITY  How much difficulty does the patient currently have. ..  -   Turning over in bed (includin Current Status  able to take 4-5 shuffling steps with RW/Modmax Ax2     Goal #4 Patient will negotiate 1 stairs/one curb w/ assistive device and min A   Goal #4   Current Status  NT   Goal #5 Patient to demonstrate independence with home activity/exercise able to stand with RW/Max Ax2. Pt performed stand pivot transfers with max Ax2 with VC. Pt is up in the chair with chair alarm. Nursing staff aware that pt needs 2 people assist for transfers.      Pt continues to stay below the baseline and will continue t -   Moving from lying on back to sitting on the side of the bed?: A Lot   How much help from another person does the patient currently need. ..   -   Moving to and from a bed to a chair (including a wheelchair)?: A Lot   -   Need to walk in hospital room?: ST.1 Naima Mazariegos PT on 11/13/2017  1:48 PM               Physical Therapy Note signed by Laly Blanton PT at 11/12/2017  5:47 PM  Version 1 of 1    Author:  Laly Blanton PT Service:  (none) Author Type:  Physical Therapist    Filed:  11/12/2017  5: arrangement. Spoke with RN at length regarding symptoms noted and discharge recommendations. Patient will benefit from continued IP PT services to address these deficits in preparation for discharge.     DISCHARGE RECOMMENDATIONS[RP.1]  PT Discharge Rec Stairs to Bedroom: 7 (pt has a chair lift that she can use)  Railing: Yes    Lives With: Spouse; Other (Comment) (spouse is pt's 24hr caregiver)  Drives: No  Patient Owned Equipment: Rolling walker;Cane;Other (Comment) (rollator, transport wheelchair)  [facility] AM-PAC '6-Clicks' INPATIENT SHORT FORM - BASIC MOBILITY  How much difficulty does the patient currently have. .. [RP.1]  -   Turning over in bed (including adjusting bedclothes, sheets and blankets)?: A Lot   -   Sitting down on and standing up from a chair Goal #4 Patient will negotiate 1 stairs/one curb w/ assistive device and min A   Goal #4   Current Status    Goal #5 Patient to demonstrate independence with home activity/exercise instructions provided to patient in preparation for discharge.    Goal #5 at the beginning of static sitting and intermittent Angela/SBA assistance with sitting EOB and frequent verbal cues for instruction on postural control and hand placement to maintain upright static sitting balance.  Patient transferred from EOB<>bedside chair Acute respiratory failure with hypoxia (HCC)  Active Problems:    Apraxia[EW.2]      Past Medical History[EW.1]  Past Medical History:   Diagnosis Date   • Ankle fracture, left 1985    ORIF   • Anxiety state    • Back problem    • Carpal tunnel syndrome O2 Saturation Patients O2 was 81% after completing bed mobility w/ RA. Patient remained sitting EOB and given 2L of O2 via nasal cannula that remained on for rest of session. O2 sats read 91% before completing transfer to chair.   O2 Device: Nasal cannula -   Taking care of personal grooming such as brushing teeth?: None  -   Eating meals?: None[EW.2]    AM-PAC Score:[EW.1]  Score: 18  Approx Degree of Impairment: 46.65%  Standardized Score (AM-PAC Scale): 38.66  CMS Modifier (G-Code): CK[EW.2]    FUNCTIONA EW.3 - Lloyd Cobos OT on 11/14/2017  1:00 PM  EW. 4 - Lloyd Cobos OT on 11/14/2017  2:46 PM                     Video Swallow Study Notes     No notes of this type exist for this encounter.          SLP Note - SLP Notes      SLP Note signed by Mitchell Pt presents with mild oral and probable pharyngeal dysfunction. Recommend general diet and nectar thick liquids/no straw per current dietary recommendations.   Speech to monitor swallowing tolerance, train swallowing precautions, and trial possible diet up Age-indeterminate lacunar infarct within the left thalamus. Remote infarcts within the brainstem. No acute hemorrhage. Moderate chronic microvascular ischemic disease.     CXR 11/12/17:  CONCLUSION:   1. Scarring, pneumonia, or asymmetric peripheral chester liquids without overt signs or symptoms of aspiration with 100 % accuracy over 3 session(s).   In Progress   Goal #2 The patient/family/caregiver will demonstrate understanding and implementation of aspiration precautions and swallow strategies independentl

## (undated) NOTE — MR AVS SNAPSHOT
[facility] SPECIALTY [facility] [place] [address] [place]  [address] [phone]               Thank you for choosing us for your health care visit with Shaggy Albarado MD.  We are glad to serve you and happy to provide you with this summary o Commonly known as:  ARICEPT ODT           hydrocodone-acetaminophen 7.5-325 MG Tabs   Take 1 tablet by mouth every 4 (four) hours as needed for Pain.    Commonly known as:  [address]   Start taking on:  4/9/2017           lactulose 10 GM/15ML Soln   TAKE 2 TABL [address] [facility]   Phone:  [phone]   Fax:  [phone]    Diagnoses:  Chronic bilateral low back pain with bilateral sciatica   Kyphosis (acquired) (postural)   Order:  Physical Therapy External        Comment:  \"Treatment: [facility] Dx:  Chronic bilateral low back pain with bilateral sciatica [M54.42, M54.41, G89.29], Kyphosis (acquired) (postural) [M40.00]          MyChart     Visit MyChart  You can access your MyChart to more actively manage your health care and view more det Modification Recommendation   Weight Reduction Maintain normal body weight (body mass index 18.5-24.9 kg/m2)   DASH eating plan Adopt a diet rich in fruits, vegetables, and low fat dairy products with reduced content of saturated and total fat.    Dietary s

## (undated) NOTE — MR AVS SNAPSHOT
[facility] SPECIALTY [facility] - [place] [address] [place]  [address] [phone]               Thank you for choosing us for your health care visit with Diane Carroll DO.   We are glad to serve you and happy to provide you with this summary of your appointment immediately. However, if you are unsure about the requirements for authorization, please wait 5-7 days and then contact your physician's office.  At that time, you will be provided with any authorization numbers or be assured that none are Butalbital-APAP-Caffeine -40 MG Tabs   Take 1 tablet by mouth every 4 (four) hours as needed. Commonly known as:  FIORICET           calcitonin (salmon) 200 UNIT/ACT Soln   1 spray by Nasal route daily.    Commonly known as:  [facility] Visits < Visit Summaries. MyChart questions? Call [phone] for help. [employer] - is now [employer] is NOT to be used for urgent needs. For medical emergencies, dial 911.            Visit [email or web address] online at  [email or web address]

## (undated) NOTE — IP AVS SNAPSHOT
Patient Demographics     Address  [address]  [facility] ([place], [place]) [address] Phone  [phone] (Home) *Preferred*  [phone] [facility]) E-mail Address  [email or web address]      Emergency Contact(s)     Name Relation Home Work [address] Budesonide-Formoterol Fumarate 160-4.5 MCG/ACT Aero  Commonly known as:  SYMBICORT  Next dose due:  7/20/18 bedtime      Inhale 2 puffs into the lungs 2 (two) times daily.           BuPROPion HCl ER (SR) 150 MG Tb12  Commonly known as:  WELLBUTRIN SR  Next Take 10 mg by mouth 2 (two) times daily. NEXIUM 24HR 20 MG Tbec  Generic drug:  Esomeprazole Magnesium  Next dose due:  7/20/18 bedtime      Take 20 mg by mouth 2 (two) times daily.           QUEtiapine Fumarate 100 MG Tabs  Commonly known as:  SE [id number] Piperacillin Sod-Tazobactam So (ZOSYN) 3.375 g in dextrose 5 % 100 mL ADD-vantage 07/20/18 0956 New Bag      [id number] QUEtiapine Fumarate (SEROQUEL) tab 150 mg 07/19/18 2116 Given      [id number] Sertraline HCl (ZOLOFT) tab 300 mg 07/20/18 0955 Sodium 139 136 - 144 mmol/L — [facility]   Potassium 3.9 3.3 - 5.1 mmol/L — [facility]   Chloride 108 95 - 110 mmol/L — [facility]   CO2 27 22 - 32 mmol/L — [facility]   BUN 9 8 - 20 mg/dL [facility]   Creatinine 0.43 0.50 - 1.50 mg/dL [facility] Specimen Information    Type Source Collected On   Blood — 07/19/18 1525          Components    Component Value Reference Range Flag Lab   Potassium 3.8 3.3 - 5.1 mmol/L — [facility]            Testing Performed By     Lab - [address] [place] Name Director A was unremarkable. Chemistries showed potassium 3.1, BUN of 23, and creatinine of 0.7. CBC showed white blood cell count of 10.7 with left shift. Chest x-ray showed new left basilar consolidation, which may suggest pneumonia or atelectasis.   The patient pressure 132/83, pulse ox 95% on room air. HEENT:  Atraumatic. Oropharynx clear. Eyes:  Anicteric sclerae. Pupils are equal, round, and reactive. NECK:  Supple, but there is torticollis toward the left with muscle spasm.   Severe kyphosis noted on the :  Blake Lee DO (Physician)     Consult Orders:    1.  IP consult to Pulmonology Once [[id number]] ordered by Jany Nguyen MD at 07/17/18 [address] [place]    Report of Consultation    Charissa Alves Patient Family History   Problem Relation Age of Onset   • Cancer Father      osteo cancer- cause of death       Social History  Patient Guardian Status:  Not on file.     Other Topics            Concern  Caffeine Concern        Yes    Comment:coffee, 1/2 cup    So latanoprost (XALATAN) 0.005 % ophthalmic solution 1 drop 1 drop Both Eyes Nightly   Memantine HCl (NAMENDA) tab 10 mg 10 mg Oral BID   [START ON 7/18/2018] Esomeprazole Magnesium TBEC 20 mg 20 mg Oral BID   QUEtiapine Fumarate (SEROQUEL) tab 150 mg 150 mg Memantine HCl 10 MG Oral Tab 2 (two) times daily. QUEtiapine Fumarate (SEROQUEL) 100 MG Oral Tab 150 mg nightly. latanoprost (XALATAN) 0.005 % Ophthalmic Solution Place 1 drop into both eyes nightly.      Sertraline HCl (ZOLOFT) 100 MG Oral Tab Take ALB 3.5 03/29/2018   ALKPHO 101 (H) 03/29/2018   TP 6.5 03/29/2018   AST 26 03/29/2018   ALT 19 03/29/2018   PTT 32.9 11/11/2017   INR 1.0 11/14/2017   PTP 12.8 11/14/2017   TSH 2.60 11/12/2017   ESRML 22 03/29/2018   CRP 0.5 03/29/2018   MG 1.8 11/15/2017 :  Kristian Reed MD (Physician)         [facility]    Discharge Summary    Shukri Aragon Patient Status:  Inpatient    1945 MRN [id number]   Location [facility] 5SW/SE Attending Kristian Reed MD   [facility] Day # 3 Community acquired pneumonia of left lung     Community acquired pneumonia of left lung, unspecified part of lung     Hypoxia     Goals of care, counseling/discussion     Advance care planning      Reason for Admission:   Patient Active Problem List: Psychiatric: calm        History of Present Illness:   Per Dr. Ramya Merino:  Generalized weakness, development of left buttock pressure ulcer, and possible aspiration pneumonia with dehydration.    HISTORY OF PRESENT ILLNESS:  The patient is Inhale 2 puffs into the lungs every 6 (six) hours as needed for Wheezing. Refills:  0     Amoxicillin-Pot Clavulanate 500-125 MG Tabs  Commonly known as:  AUGMENTIN      Take 1 tablet by mouth 3 (three) times daily.    Stop taking on:  7/24/2018  Refills 2 (two) times daily. Refills:  0     NEXIUM 24HR 20 MG Tbec  Generic drug:  Esomeprazole Magnesium      Take 20 mg by mouth 2 (two) times daily. Refills:  0     QUEtiapine Fumarate 100 MG Tabs  Commonly known as:  SEROQUEL      150 mg nightly.    Re patients pre-admission status. Pt is bed & w/c bound at baseline. Pt's  Meliza Hollins is primary caregiver. Pt is mod to max A of 2 people during PT eval for out of bed to chair. Recommend lift from chair to bed. Patient is content being up in chair.  Recomm Patient Regularly Uses:  (w/c)    Prior Level of Vance: dependent    SUBJECTIVE  \"it's a hard job\"    PHYSICAL THERAPY EXAMINATION     OBJECTIVE     Fall Risk: High fall risk    PAIN ASSESSMENT  Ratin          COGNITION  · Overall Cognitive St Occupational Therapy Notes (last 72 hours) (Notes from 7/17/2018 11:56 AM through 7/20/2018 11:56 AM)      Occupational Therapy Note signed by Berto Gonzalez OT at 7/18/2018 12:58 PM  Version 1 of 1    Author:  Berto Gonzalez OT Service:  (none) Au Therapy services. Please re-order if a new functional limitation presents during this admission.     OCCUPATIONAL THERAPY MEDICAL/SOCIAL HISTORY     Problem List  Principal Problem:    Community acquired pneumonia of left lung, unspecified part of lung  Act AM-PAC ‘6-Clicks’ Inpatient Daily Activity Short Form  How much help from another person does the patient currently need…  -   Putting on and taking off regular lower body clothing?: Total  -   Bathing (including washing, rinsing, drying)?: Total  -   Toil ensure safe tolerance of diet and to reinforce all swallowing precautions/strategies to improve safety/efficiency of the swallow. Family education to be completed as available.           RECOMMENDATIONS[MD.1]   Diet Recommendations - Solids: Regular  Diet R Family/Patient Goals: \"to feel better'    ASSESSMENT   DYSPHAGIA ASSESSMENT  Test completed in conjunction with Radiologist.[MD.1]  Patient Positioned:  (kyphotic position at baseline)[MD.2]. [MD.1]  Patient Viewed: Anish Davis. [MD.1]  Patient Alertness: Goal #2[MD.1] The patient will utilize compensatory strategies as outlined by  VFSS including Slow rate, Small bites, Small sips, Alternate liquids/solids, No straws with minimal assistance 90 % of the time across 1 session.     In Progress[MD.3]   EDUCATIO

## (undated) NOTE — IP AVS SNAPSHOT
[facility]            (For Outpatient Use Only) Initial Admit Date: 8/9/2018   Inpt/Obs Admit Date: Inpt: 8/9/18 / Obs: N/A   Discharge Date:    Devante Pouch:  [de-identified]   MRN: [de-identified]   CSN: [id number]        ENCOUNTER  Patient Class: Hospital Account Financial Class: Medicare Advantage    September 8, 2018

## (undated) NOTE — Clinical Note
EMERGENCY INFORMATION POCKET CARD    Name:  Isabel Bains         YOB: 1945      Emergency Contacts:    Name Relationship Lgl Divinaa. [address] Work Phone Home Phone Mobile Phone   1.  Verna Ryan Spouse   [phone]       Primary Care Physician: Donepezil HCl 10 MG Oral Tablet Dispersible  Disp:  Rfl:    Memantine HCl 10 MG Oral Tab  Disp:  Rfl:    LORazepam (ATIVAN) 1 MG Oral Tab  Disp:  Rfl:    QUEtiapine Fumarate (SEROQUEL) 100 MG Oral Tab daily.  Disp:  Rfl:    QUEtiapine Fumarate (SEROQUEL) 50

## (undated) NOTE — MR AVS SNAPSHOT
[facility] [place] [facility] [place] [address]  [address] [phone]               Thank you for choosing us for your health care visit with Carly Conteh MD.  We are glad to serve you and happy to provide you with this summary o Bactrim [Sulfamethoxazole W/Trimethoprim] Rash, Shortness of Breath    Per pt received bactrim and clindamycin in Er not sure which one caused the reaction.                  Today's Vital Signs     BP Pulse Weight             141/85 mmHg 78 102 lb (46.267 * Notice: This list has 2 medication(s) that are the same as other medications prescribed for you. Read the directions carefully, and ask your doctor or other care provider to review them with you.          Where to Get Your Medications      These medicat TOP FALL PREVENTION TIPS    INSIDE YOUR HOME   KITCHEN:  ? Use non skid mats only. ? Clean up spills as soon as they happen. ? Keep objects that you use often within easy reach.   BATHROOM:  ? Install grab bars on the bathroom walls beside the tub

## (undated) NOTE — IP AVS SNAPSHOT
Patient Demographics     Address  [address]  [facility] [place] [address] Phone  [phone] (Home) *Preferred*  [phone] [facility]) E-mail Address  [email or web address]      Emergency Contact(s)     Name Relation Home Work [address] Commonly known as:  BENTYL  Next dose due:  10/02/17 in the evening      TAKE 1 CAPSULE TWICE DAILY   Duane Galla, MD         Donepezil HCl 10 MG Tbdp  Commonly known as:  ARICEPT ODT  Next dose due:  10/02/17 in the evening       nightly.           la Order ID Medication Name Action Time Action Reason Comments    [id number] BuPROPion HCl ER (XL) (WELLBUTRIN XL) 150 MG 24 hr tab 150 mg 10/01/17 1630 Given      [id number] BuPROPion HCl ER (XL) (WELLBUTRIN XL) 150 MG 24 hr tab 150 mg 10/02/17 1218 Given [id number] latanoprost (XALATAN) 0.005 % ophthalmic solution 1 drop 10/01/17 2037 Given      [id number] predniSONE (DELTASONE) tab 30 mg 10/02/17 1219 Given            LEFT NARIS     Order ID Medication Name Action Time Action Reason Comments    [id number] c Enoc Cannon [id number] 04/29/14 1547 - Present            Microbiology Results (All)     Procedure Component Value Units Date/Time    Urine Culture, Routine Once [[id number]] Collected:  09/28/17 1809    Order Status:  Completed Lab Status:  Final result Upd chronic pain syndrome. She has severe anxiety disorder and delusional and psychotic disorder. She had contact dermatitis, gastroesophageal reflux disease. PAST SURGICAL HISTORY:  She had several thoracic kyphoplasties.  She had bilateral wrist fracture NEUROLOGIC:  No focal deficits. Motor and sensory intact. ASSESSMENT AND PLAN:    1. Bronchospasm and possible underlying obstructive disease considering her chronic tobacco use and restrictive lung disease _______ severe kyphosis.   2.   Chronic pa • Diverticulosis 1-   • Esophageal reflux    • Fibromyalgia    • Glaucoma    • Osteoarthritis    • Spinal stenosis    • Sternal fracture 02/ 2011    from fall          PSH:  Past Surgical History:  2009: CARPAL TUNNEL RELEASE      Comment: per NG; L Clindamycin             Rash, Shortness of Breath    Comment:Per pt received this medicine and bactrim in ER             not sure which caused reaction.   Bactrim [Sulfametho*    Rash, Shortness of Breath    Comment:Per pt received bactrim and clindamycin ASSESSMENT/PLAN:     Wheeze  H/o tob use  No wheeze now  BNP mild +  No sx  Plan very short course of po pred   Nebs   Outpt PFTs   D/c to home in AM   Consider ECHO    Weakness  Plan rec home PT/OT    DVT px  Sq hep    EOL  Pt made FULL code  Discussed wh Pt wanted to sit up in chair. Pt with difficulty performing bed mobility and scooting. When transferring, pt kept her weight back and was unable to bring it forward despite cueing. Pt had taken her O2 off prior to PT coming into the room.  After pt was sitt -   Moving from lying on back to sitting on the side of the bed?: A Lot   How much help from another person does the patient currently need. ..   -   Moving to and from a bed to a chair (including a wheelchair)?: A Lot   -   Need to walk in hospital room?: Goal #5 Patient to demonstrate independence with home activity/exercise instructions provided to patient in preparation for discharge. Goal #5   Current Status  in progress   Goal #6     Goal #6  Current Status[LV.1]           Attribution Key    LV. 1 - V currently possess the skills to return home with her .  She will benefit from further skilled PT in hospital as well as upon d/c at a SNF to maximize mobility and wean off of O2, she does show the potential to make significant improvement in current Comment: done by dr.mc arias[MJ.2]    HOME SITUATION[MJ.1]  Type of Home: House   Home Layout: One level                Lives With: Spouse  Drives: No  Patient Owned Equipment: Rolling walker;Cane;Other (Comment) (w/c)[MJ.2]       Prior Level of Indepe How much difficulty does the patient currently have. .. [MJ.1]  -   Turning over in bed (including adjusting bedclothes, sheets and blankets)?: A Lot   -   Sitting down on and standing up from a chair with arms (e.g., wheelchair, bedside commode, etc.): A Lo Goal #1 Patient is able to demonstrate supine - sit EOB @ level: moderate assistance     Goal #1   Current Status    Goal #2 Patient is able to demonstrate transfers Sit to/from Stand at assistance level: moderate assistance with walker - rolling     Goal Provider Leydi Clayton    11/7/2017 3:00 PM Heydi Vasquez, [address] [facility], [address] [place] [address]

## (undated) NOTE — IP AVS SNAPSHOT
[facility]            (For Outpatient Use Only) Initial Admit Date: 11/11/2017   Inpt/Obs Admit Date: Inpt: 11/11/17 / Obs: N/A   Discharge Date:    Philip Rush:  [de-identified]   MRN: [de-identified]   CSN: [id number]        ENCOUNTER  Patient [facility] Account Financial Class: Medicare Advantage    November 15, 2017

## (undated) NOTE — IP AVS SNAPSHOT
[address]  [address] [facility] [phone]                Discharge Summary   5/14/2017    Brant Jean Baptiste           Admission Information        Provider Department    5/14/2017 Donald Ambriz MD [facility] 4w/ Take 1 tablet (1 mg total) by mouth every 4 (four) hours as needed for Anxiety.     [address]                    As needed for anxiety         CONTINUE taking these medications        Instructions Authorizing Provider    Morning Afternoon Evening As Last time this was given:  10 mg on 5/17/2017 10:21 AM   Commonly known as:  NAMENDA        2 (two) times daily.          9am           9pm           mupirocin 2 % Oint   Commonly known as:  BACTROBAN        Apply to heel 3 x a day for 5 days    Osmar Millan Contact information:    Nadeem Solo  [phone]        Future Appointments     Jun 29, 2017  3:15 PM   Exam - Established with Emiliano Mi MD   [facility] SPECIALTY [facility], [address] ([facility])    [address] [address] 9.1 (04/07/17)  90 (04/07/17)  28      Metabolic Lab Results  (Last result in the past 90 days)    ALT Bilirubin,Total Total Protein Albumin Sodium Potassium Chloride    (04/07/17)  26 (04/07/17)  0.6 (04/07/17)  6.8 (04/07/17)  3.7 (05/16/17)  141 (05/16/ _____________________________________________________________________________    Medication Side Effects - Medications to be taken at home  As your caregivers, we want you to be aware of the medications you are prescribed to take and their potential SIDE E increased heart rate, increased blood pressure, allergic reaction, yeast infection of the mouth/tongue   What to report to your healthcare provider: Head or mouth pain, coating on mouth/tongue, shortness of breath, sensation of heart racing, rash, or itchi

## (undated) NOTE — LETTER
7/21/2017              [address] [facility] [place] [address]         Dear William Lund,       There is no need for further testing at this time. I look forward to seeing you at your next scheduled appointment.   Recomme

## (undated) NOTE — Clinical Note
Pt rescheduled her TCM apt out of TCM 14day window. Will come in as regular hospital f/u on 2/27/18. Episode to be closed.

## (undated) NOTE — IP AVS SNAPSHOT
[facility]            (For Outpatient Use Only) Initial Admit Date: 7/17/2018   Inpt/Obs Admit Date: Inpt: 7/17/18 / Obs: N/A   Discharge Date:    Karen Reynoso:  [de-identified]   MRN: [de-identified]   CSN: [id number]        ENCOUNTER  Patient Class July 20, 2018

## (undated) NOTE — MR AVS SNAPSHOT
[facility] SPECIALTY [facility] [place] [address] [address]  [id number] [phone]               Thank you for choosing us for your health care visit with Kitty Paulson MD.  We are glad to serve you and happy to provide you with this summary o Commonly known as:  WELLBUTRIN SR           Butalbital-APAP-Caffeine -40 MG Tabs   Take 1 tablet by mouth every 4 (four) hours as needed. Commonly known as:  FIORICET           calcitonin (salmon) 200 UNIT/ACT Soln   1 spray by Nasal route daily. your doctor or other care provider to review them with you.          Where to Get Your Medications      You can get these medications from any pharmacy     Bring a paper prescription for each of these medications    - hydrocodone-acetaminophen 7.5-325 MG Ta ? Keep furniture in its accustomed place. ? If you have pets, be careful that you don’t trip over them. OUTSIDE SAFETY TIPS  ? Always wear good shoes with proper support and traction. ? Always use hand rails on stairs and escalators.   ? Cover porch s

## (undated) NOTE — IP AVS SNAPSHOT
Patient Demographics     Address Phone E-mail Address    [address]  [address] [place] [address] [phone] (Home) *Preferred*  [phone] (Mobile) [email or web address]      Emergency Contact(s)     Name Relation Home Work [address] Inhale 2 puffs into the lungs every 6 (six) hours as needed for Wheezing. For wheezing       BuPROPion HCl ER (SR) 150 MG Tb12   Last time this was given:  150 mg on 5/17/2017 10:21 AM   Commonly known as:  WELLBUTRIN SR        daily. mupirocin 2 % Oint   Commonly known as:  BACTROBAN        Apply to heel 3 x a day for 5 days    Daniel Rodriguez                    Healed; scabbed       NEXIUM 24HR 20 MG Tbec   Generic drug:  Esomeprazole Magnesium        Take 20 mg by mouth 2 (two) ti [id number] QUEtiapine Fumarate (SEROQUEL) tab 100 mg 05/16/17 2014 Given      [id number] QUEtiapine Fumarate (SEROQUEL) tab 50 mg 05/16/17 2014 Given      [id number] Sertraline HCl (ZOLOFT) tab 100 mg 05/17/17 1021 Given      [id number] Sodium Chloride 0.9 % Procedure Component Value Units Date/Time    MRSA/SA Scrn by PCR:[facility] only Once [[id number]]  (Normal) Collected:  05/14/17 5632    Order Status:  Completed Lab Status:  Final result Updated:  05/15/17 0932    Specimen Information:  Other from Na from fall    • Colon polyp 1-   • Diverticulosis 1-   • Esophageal reflux          Past Surgical History    CATARACT EXTRACTION  2009    CARPAL TUNNEL RELEASE  2009    Comment per SUNNI; L CTS surgery    IR KYPHOPLASTY  01/16/2013    Comment K NEXIUM 24HR 20 MG Oral Tab EC Taking  Yes Yes   Sig: Take 20 mg by mouth 2 (two) times daily. QUEtiapine Fumarate (SEROQUEL) 100 MG Oral Tab Taking  Yes Yes   Sig: daily.    QUEtiapine Fumarate (SEROQUEL) 50 MG Oral Tab Taking  Yes Yes   Sertraline HCl (Z Gastrointestinal:  Soft, non-tender, non-distended, normal bowel sounds, no organomegaly. Lymphatics:  No lymphadenopathy neck, axilla, groin.   Musculoskeletal: Decreased range of motion left hip secondary to pain, is able to flex and extend at this time  1945 MRN [id number]   Location [facility] 4W/SW/SE Attending Boo Grajeda MD   [facility] Day # 2 PCP Kanu Maya MD     Date of Admission:  2017  Date of Consult:  2017   Reason for Consultation:   Patient with history of depressio • Fibromyalgia    • Ankle fracture, left 1985     ORIF   • Carpal tunnel syndrome 2009     L CTS surgery   • Sternal fracture 02/ 2011     from fall    • Colon polyp 1-   • Diverticulosis 1-   • Esophageal reflux        Past Surgical History LORazepam (ATIVAN) tab 1 mg 1 mg Oral Q4H PRN       Prescriptions prior to admission:  [START ON 7/8/2017] hydrocodone-acetaminophen 7.5-325 MG Oral Tab Take 1 tablet by mouth every 4 (four) hours as needed for Pain.    Albuterol Sulfate  (90 Base) M K 4.6 05/16/2017    05/16/2017   CO2 27 05/16/2017   * 05/16/2017   CA 9.1 05/16/2017   ALB 3.7 04/07/2017   ALKPHO 90 04/07/2017   TP 6.8 04/07/2017   AST 28 04/07/2017   ALT 26 04/07/2017   TSH 1.77 02/11/2015         Imaging:  Xr Shoulder, treatment plan. Impression:   Impression:       Generalized anxiety disorder  Mixed vascular and degenerative dementia    Discussed risk and benefit, acknowledging the current symptom and severity.   The patient sustained a recent fall with a fracture Room Number: 410/410-A       Presenting Problem: fall    Problem List  Principal Problem:    Fall, initial encounter  Active Problems:    Fall    Acute pain of right shoulder    Hip injury, left, initial encounter    Closed left hip fracture, initial encou -   Moving from lying on back to sitting on the side of the bed?: A Lot   How much help from another person does the patient currently need. ..   -   Moving to and from a bed to a chair (including a wheelchair)?: A Lot   -   Need to walk in hospital room?: Author:  Kael Newell PTA Service:  (none) Author Type:  Physical Therapist    Filed:  5/16/2017 10:07 AM Note Time:  5/16/2017  9:59 AM Status:  Signed    :  Kael Newell PTA (Physical Therapist)            PHYSICAL THERAPY TREATMENT NOTE wheelchair, bedside commode, etc.): A Lot   -   Moving from lying on back to sitting on the side of the bed?: A Lot   How much help from another person does the patient currently need. ..   -   Moving to and from a bed to a chair (including a wheelchair)?: Author:  Naz Pacheco PT Service:  (none) Author Type:  Physical Therapist    Filed:  5/15/2017  3:40 PM Note Time:  5/15/2017  3:22 PM Status:  Signed    :  Naz Pacheco PT (Physical Therapist)               PHYSICAL THERAPY [facility] PT Discharge Recommendations: Sub-acute rehabilitation    PLAN  PT Treatment Plan: Bed mobility; Body mechanics; Endurance; Energy conservation;Patient education; Family education;Gait training;Balance training;Stair training;Transfer training  Rehab Potential Pt denies pain at rest.    PHYSICAL THERAPY EXAMINATION     OBJECTIVE  Precautions: Limb alert - left  Fall Risk: High fall risk    WEIGHT BEARING RESTRICTION  Weight Bearing Restriction: L lower extremity           L Lower Extremity: Full Weight Bearing Gait Assistance: Moderate assistance  Distance (ft): 15  Assistive Device: Rolling walker  Pattern: Shuffle (narrowed RAMILA and posterior lean throughout gait)  Stoop/Curb Assistance: Not tested       Bed Mobility:mod A for bed mobility.     Transfers:min A f :  Rebel Howard OT (Occupational Therapist)           OCCUPATIONAL THERAPY TREATMENT NOTE - INPATIENT     Room Number: 376/878-H         Presenting Problem:  (LT greater trochanter fx, non surgical)    Problem List  Principal Problem:    Fall, How much help from another person does the patient currently need…  -   Putting on and taking off regular lower body clothing?: A Lot  -   Bathing (including washing, rinsing, drying)?: A Lot  -   Toileting, which includes using toilet, bedpan or urinal? : Physician Order: IP Consult to Occupational Therapy  Reason for Therapy: ADL/IADL Dysfunction and Discharge Planning    OCCUPATIONAL THERAPY ASSESSMENT     Patient is a 66year old female admitted 5/14/2017 for L undisplaced greater trochanter fx; severe k eval/education; Compensatory technique education         OCCUPATIONAL THERAPY MEDICAL/SOCIAL HISTORY     Problem List   Principal Problem:    Fall, initial encounter  Active Problems:    Fall    Acute pain of right shoulder    Hip injury, left, initial enco Fall Risk: High fall risk    WEIGHT BEARING RESTRICTION  Weight Bearing Restriction: L lower extremity           L Lower Extremity: Full Weight Bearing    PAIN ASSESSMENT  Rating: Unable to rate (With movement)  Location:  (LLE)  Management Techniques: Rep FUNCTIONAL ADL ASSESSMENT  Grooming: n/t   Bathing: n/t   Toileting: max.  A   Upper Body Dressing: n/t   Lower Body Dressing: n/t       Education Provided: Role of OT, bed mobility, sit to stands, transfer skills, ADLs    Patient End of Session: Up in [place]